# Patient Record
Sex: MALE | Race: WHITE | NOT HISPANIC OR LATINO
[De-identification: names, ages, dates, MRNs, and addresses within clinical notes are randomized per-mention and may not be internally consistent; named-entity substitution may affect disease eponyms.]

---

## 2019-09-23 ENCOUNTER — OTHER (OUTPATIENT)
Age: 84
End: 2019-09-23

## 2019-09-23 DIAGNOSIS — M25.559 PAIN IN UNSPECIFIED HIP: ICD-10-CM

## 2019-09-23 PROBLEM — Z00.00 ENCOUNTER FOR PREVENTIVE HEALTH EXAMINATION: Status: ACTIVE | Noted: 2019-09-23

## 2019-10-01 ENCOUNTER — APPOINTMENT (OUTPATIENT)
Dept: ORTHOPEDIC SURGERY | Facility: CLINIC | Age: 84
End: 2019-10-01
Payer: MEDICARE

## 2019-10-01 VITALS
SYSTOLIC BLOOD PRESSURE: 131 MMHG | BODY MASS INDEX: 28.44 KG/M2 | HEIGHT: 72 IN | WEIGHT: 210 LBS | HEART RATE: 69 BPM | DIASTOLIC BLOOD PRESSURE: 63 MMHG

## 2019-10-01 DIAGNOSIS — Z72.89 OTHER PROBLEMS RELATED TO LIFESTYLE: ICD-10-CM

## 2019-10-01 DIAGNOSIS — Z86.79 PERSONAL HISTORY OF OTHER DISEASES OF THE CIRCULATORY SYSTEM: ICD-10-CM

## 2019-10-01 DIAGNOSIS — Z87.438 PERSONAL HISTORY OF OTHER DISEASES OF MALE GENITAL ORGANS: ICD-10-CM

## 2019-10-01 DIAGNOSIS — Z86.39 PERSONAL HISTORY OF OTHER ENDOCRINE, NUTRITIONAL AND METABOLIC DISEASE: ICD-10-CM

## 2019-10-01 DIAGNOSIS — Z78.9 OTHER SPECIFIED HEALTH STATUS: ICD-10-CM

## 2019-10-01 PROCEDURE — 99204 OFFICE O/P NEW MOD 45 MIN: CPT

## 2019-10-01 PROCEDURE — 73521 X-RAY EXAM HIPS BI 2 VIEWS: CPT

## 2019-10-01 RX ORDER — POTASSIUM CHLORIDE 1500 MG/1
20 TABLET, FILM COATED, EXTENDED RELEASE ORAL
Refills: 0 | Status: ACTIVE | COMMUNITY

## 2019-10-01 RX ORDER — EZETIMIBE 10 MG/1
10 TABLET ORAL
Refills: 0 | Status: ACTIVE | COMMUNITY

## 2019-10-01 RX ORDER — ALFUZOSIN HCL 10 MG/1
10 TABLET, EXTENDED RELEASE ORAL
Refills: 0 | Status: ACTIVE | COMMUNITY

## 2019-10-01 RX ORDER — VALSARTAN 80 MG
80 CAPSULE ORAL
Refills: 0 | Status: ACTIVE | COMMUNITY

## 2019-10-01 RX ORDER — UBIDECARENONE 50 MG
600 CAPSULE ORAL
Refills: 0 | Status: ACTIVE | COMMUNITY

## 2019-10-01 RX ORDER — TORSEMIDE 20 MG/1
20 TABLET ORAL
Refills: 0 | Status: ACTIVE | COMMUNITY

## 2019-10-01 RX ORDER — FINASTERIDE 5 MG/1
5 TABLET, FILM COATED ORAL
Refills: 0 | Status: ACTIVE | COMMUNITY

## 2019-10-01 RX ORDER — POTASSIUM CHLORIDE 1500 MG/1
20 TABLET, EXTENDED RELEASE ORAL
Refills: 0 | Status: ACTIVE | COMMUNITY

## 2019-10-01 NOTE — HISTORY OF PRESENT ILLNESS
[de-identified] : This is an 89-year-old male who presents for evaluation of his hips. He reports pain for many years. Over the last 18 months left hip pain is greater than right hip pain. He denies pain with sitting. He reports doing symptoms he is become far more sedentary. He notes pain with transferring and weightbearing. His difficulty donning/doffing shoes and socks. He uses a walker at all times. He does take Tylenol and Aleve with minimal improvement. He's been seen by pain management. He reports having intra-articular cortisone injections years ago. He concluded a series of Visco supplementation to both hips approximately one month ago. He is noting no significant relief. He presents today to discuss joint replacement surgery. He does report seeing his cardiologist and is currently undergoing a workup to see if he can be cleared for surgical intervention.

## 2019-10-01 NOTE — DISCUSSION/SUMMARY
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  But patient's quality of life is diminished by his hip arthritis and he is interested in hip replacement surgery as he has exhausted conservative treatment.  I do have some concerns with regard to hip replacement in this elderly gentleman.  He has kyphosis and hip flexion contractures and would therefore not be a candidate for an anterior approach, so we would have to do a posterior approach.  With his flexion deformity and kyphosis he is at increased risk of postoperative dislocation.  He also has pitting edema of the lower legs bilaterally which is a major risk factor.  This would have to be improved before we considered elective hip replacement surgery for him.  He was advised to consult with his primary care doctor and cardiologist to determine his medical risk for surgery and whether they would clear him for it.  We can then follow-up for further discussion.\par \par  A discussion was had with the patient regarding a left total hip replacement. Anterior and posterior exposures were discussed.  A long discussion was had with the patient as what the total joint replacement would entail. A model was used to demonstrate the operation and to discuss bearing surfaces of the implants. The hospitalization and rehabilitation were discussed. The use of perioperative antibiotics and DVT prophylaxis were discussed. The risks, benefits and alternatives to surgical intervention were discussed at length with the patient. Specific risks discussed included: infection, wound breakdown, numbness and damage to nerves, tendon, muscle, arteries or other blood vessels, and the possibility of leg length discrepancy. The possibility of recurrent pain, no improvement in pain and actual worsening of the pain were also mentioned in conversation with the patient. Medical complications related to the patient's general medical health including deep vein thrombosis, pulmonary embolus, heart attack, stroke, death and other complications from anesthesia were discussed as well. The patient was told that we will take steps to minimize these risks by using sterile technique, antibiotics and DVT prophylaxis when appropriate and following the patient postoperatively in the clinic setting to monitor progress. The benefits of surgery were discussed with the patient including the potential to improve the current clinical condition through operative intervention.  All questions were answered to the satisfaction of the patient. Models were used as an educational tool. We did discuss implant choices and fixation, with shared decision making with the patient.\par

## 2019-10-01 NOTE — PHYSICAL EXAM
[de-identified] : Exam of the left hip shows hip flexion of 80 degrees, external rotation of 20 degrees, internal rotation is -10 degrees\par Exam of the right hip shows a 15 degrees flexion contracture, hip flexion of 90 degrees, external rotation of 50 degrees, internal rotation is neutral and painful. 5/5 motor strength bilaterally distally. Sensation intact distally. Difficulty getting legs onto the exam table. [de-identified] : The patient appears well nourished  and in no apparent distress.  The patient is alert and oriented to person, place, and time.   Affect and mood appear normal. The head is normocephalic and atraumatic.  The eyes reveal normal sclera and extra ocular muscles are intact. The tongue is midline with no apparent lesions.  Skin shows 2+ pitting edema BLE to the knee.  No respiratory distress noted.  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

## 2019-10-01 NOTE — REVIEW OF SYSTEMS
[Joint Pain] : joint pain [Joint Stiffness] : joint stiffness [Joint Swelling] : joint swelling [Urinary Frequency] : urinary frequency

## 2019-10-01 NOTE — CONSULT LETTER
[Dear  ___] : Dear  [unfilled], [Consult Letter:] : I had the pleasure of evaluating your patient, [unfilled]. [Please see my note below.] : Please see my note below. [Consult Closing:] : Thank you very much for allowing me to participate in the care of this patient.  If you have any questions, please do not hesitate to contact me. [Sincerely,] : Sincerely, [FreeTextEntry3] : Mark Mcdermott MD\par Chief of Joint Replacement\par [facility] \par [facility]\par \par  [FreeTextEntry2] : MACKENZIE CLOUD MD\par

## 2019-10-01 NOTE — ADDENDUM
[FreeTextEntry1] : This note was authored by Ted Bonilla working as a medical scribe for Dr. Mark Mcdermott. The note was reviewed, edited, and revised by Dr. Mark Mcdermott whom is in agreement with the exam findings, imaging findings, and treatment plan. 10/01/2019.

## 2019-11-19 ENCOUNTER — OTHER (OUTPATIENT)
Age: 84
End: 2019-11-19

## 2019-11-26 ENCOUNTER — APPOINTMENT (OUTPATIENT)
Dept: ORTHOPEDIC SURGERY | Facility: CLINIC | Age: 84
End: 2019-11-26
Payer: MEDICARE

## 2019-11-26 VITALS
SYSTOLIC BLOOD PRESSURE: 148 MMHG | BODY MASS INDEX: 28.44 KG/M2 | HEART RATE: 55 BPM | HEIGHT: 72 IN | DIASTOLIC BLOOD PRESSURE: 63 MMHG | WEIGHT: 210 LBS

## 2019-11-26 DIAGNOSIS — M16.0 BILATERAL PRIMARY OSTEOARTHRITIS OF HIP: ICD-10-CM

## 2019-11-26 PROCEDURE — 72170 X-RAY EXAM OF PELVIS: CPT

## 2019-11-26 PROCEDURE — 99215 OFFICE O/P EST HI 40 MIN: CPT

## 2019-11-26 NOTE — DISCUSSION/SUMMARY
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  The patient has significant kyphosis and a substantial hip flexion contracture.  He is here today with his daughter requesting to move forward with hip replacement surgery.  His daughter is in agreement with moving forward with surgery for quality of life.  He was seen by his cardiologist and per the patient reports that he was told that he could proceed with surgery.  We again discussed my concerns of postoperative dislocation given his flexion contracture and kyphosis and that we would need to do a posterior approach for this procedure.  At this point his quality of life is poor and he would like to move forward with surgery as soon as possible.  Based upon the patients continued symptoms and failure to respond to conservative treatment I have recommended a left total hip replacement for the patient. A discussion was had with the patient regarding a left total hip replacement. Anterior and posterior exposures were discussed.  A posterior approach is more appropriate for this patient. A long discussion was had with the patient as what the total joint replacement would entail. A model was used to demonstrate the operation and to discuss bearing surfaces of the implants. The hospitalization and rehabilitation were discussed. The use of perioperative antibiotics and DVT prophylaxis were discussed. The risks, benefits and alternatives to surgical intervention were discussed at length with the patient. Specific risks discussed included: infection, wound breakdown, numbness and damage to nerves, tendon, muscle, arteries or other blood vessels, and the possibility of leg length discrepancy. The possibility of recurrent pain, no improvement in pain and actual worsening of the pain were also mentioned in conversation with the patient. Medical complications related to the patient's general medical health including deep vein thrombosis, pulmonary embolus, heart attack, stroke, death and other complications from anesthesia were discussed as well. The patient was told that we will take steps to minimize these risks by using sterile technique, antibiotics and DVT prophylaxis when appropriate and following the patient postoperatively in the clinic setting to monitor progress. The benefits of surgery were discussed with the patient including the potential to improve the current clinical condition through operative intervention. Alternatives to surgical intervention include continued conservative management which may yield less than optimal results in this particular patient. All questions were answered to the satisfaction of the patient. Models were used as an educational tool. We did discuss implant choices and fixation, with shared decision making with the patient.\par

## 2019-11-26 NOTE — HISTORY OF PRESENT ILLNESS
[de-identified] : The patient is a 89 year old male being seen for evaluation of his left hip.  He is here today with his daughter.  Since his last visit to the office 8 weeks ago he reports persistent pain. Pain is constant and worse with weightbearing activities. He is utilizing a walker. He has difficulty donning/doffing shoes and socks. He is failed conservative treatment. He reports having seen his cardiologist who cleared him for possible surgical intervention. He has been utilizing compression stockings on the left lower extremity for pitting edema.

## 2019-11-26 NOTE — ADDENDUM
[FreeTextEntry1] : This note was authored by Ted Bonilla working as a medical scribe for Dr. Mark Mcdermott. The note was reviewed, edited, and revised by Dr. Mark Mcdermott whom is in agreement with the exam findings, imaging findings, and treatment plan. 11/26/2019.

## 2019-11-26 NOTE — PHYSICAL EXAM
[de-identified] : Exam of the left hip shows a 20-30 degree flexion contracture,  hip flexion of 80 degrees, external rotation of 20 degrees, internal rotation is -10 degrees\par Exam of the right hip shows a 20 degree flexion contracture, hip flexion of 90 degrees, external rotation of 50 degrees, internal rotation is neutral and painful. 5/5 motor strength bilaterally distally. Sensation intact distally. Difficulty getting legs onto the exam table. [de-identified] : The patient appears well nourished and in no apparent distress. The patient is alert and oriented to person, place, and time. Affect and mood appear normal. The head is normocephalic and atraumatic. The eyes reveal normal sclera and extra ocular muscles are intact. The tongue is midline with no apparent lesions. Skin shows 1+ pitting edema BLE to the knee. No respiratory distress noted. Sensation grossly intact. [de-identified] : X-ray 2 views of the pelvis (sitting and standing lateral pelvic view) was reviewed to determine pelvic motion with regard to the hip articulation.

## 2019-12-02 ENCOUNTER — FORM ENCOUNTER (OUTPATIENT)
Age: 84
End: 2019-12-02

## 2019-12-03 ENCOUNTER — OUTPATIENT (OUTPATIENT)
Dept: OUTPATIENT SERVICES | Facility: HOSPITAL | Age: 84
LOS: 1 days | End: 2019-12-03
Payer: MEDICARE

## 2019-12-03 VITALS
RESPIRATION RATE: 20 BRPM | SYSTOLIC BLOOD PRESSURE: 129 MMHG | DIASTOLIC BLOOD PRESSURE: 61 MMHG | HEART RATE: 61 BPM | WEIGHT: 209 LBS | HEIGHT: 73 IN | TEMPERATURE: 98 F

## 2019-12-03 DIAGNOSIS — I10 ESSENTIAL (PRIMARY) HYPERTENSION: ICD-10-CM

## 2019-12-03 DIAGNOSIS — Z98.890 OTHER SPECIFIED POSTPROCEDURAL STATES: Chronic | ICD-10-CM

## 2019-12-03 DIAGNOSIS — Z01.818 ENCOUNTER FOR OTHER PREPROCEDURAL EXAMINATION: ICD-10-CM

## 2019-12-03 DIAGNOSIS — Z98.49 CATARACT EXTRACTION STATUS, UNSPECIFIED EYE: Chronic | ICD-10-CM

## 2019-12-03 DIAGNOSIS — M16.12 UNILATERAL PRIMARY OSTEOARTHRITIS, LEFT HIP: ICD-10-CM

## 2019-12-03 DIAGNOSIS — Z29.9 ENCOUNTER FOR PROPHYLACTIC MEASURES, UNSPECIFIED: ICD-10-CM

## 2019-12-03 DIAGNOSIS — E78.5 HYPERLIPIDEMIA, UNSPECIFIED: ICD-10-CM

## 2019-12-03 LAB
ANION GAP SERPL CALC-SCNC: 11 MMOL/L — SIGNIFICANT CHANGE UP (ref 5–17)
APTT BLD: 29.1 SEC — SIGNIFICANT CHANGE UP (ref 27.5–36.3)
BASOPHILS # BLD AUTO: 0.08 K/UL — SIGNIFICANT CHANGE UP (ref 0–0.2)
BASOPHILS NFR BLD AUTO: 0.9 % — SIGNIFICANT CHANGE UP (ref 0–2)
BLD GP AB SCN SERPL QL: SIGNIFICANT CHANGE UP
BUN SERPL-MCNC: 36 MG/DL — HIGH (ref 8–20)
CALCIUM SERPL-MCNC: 9.2 MG/DL — SIGNIFICANT CHANGE UP (ref 8.6–10.2)
CHLORIDE SERPL-SCNC: 100 MMOL/L — SIGNIFICANT CHANGE UP (ref 98–107)
CO2 SERPL-SCNC: 29 MMOL/L — SIGNIFICANT CHANGE UP (ref 22–29)
CREAT SERPL-MCNC: 1.17 MG/DL — SIGNIFICANT CHANGE UP (ref 0.5–1.3)
EOSINOPHIL # BLD AUTO: 0.15 K/UL — SIGNIFICANT CHANGE UP (ref 0–0.5)
EOSINOPHIL NFR BLD AUTO: 1.7 % — SIGNIFICANT CHANGE UP (ref 0–6)
GLUCOSE SERPL-MCNC: 83 MG/DL — SIGNIFICANT CHANGE UP (ref 70–115)
HBA1C BLD-MCNC: 6.2 % — HIGH (ref 4–5.6)
HCT VFR BLD CALC: 37.7 % — LOW (ref 39–50)
HGB BLD-MCNC: 12 G/DL — LOW (ref 13–17)
IMM GRANULOCYTES NFR BLD AUTO: 0.3 % — SIGNIFICANT CHANGE UP (ref 0–1.5)
INR BLD: 1.03 RATIO — SIGNIFICANT CHANGE UP (ref 0.88–1.16)
LYMPHOCYTES # BLD AUTO: 1.78 K/UL — SIGNIFICANT CHANGE UP (ref 1–3.3)
LYMPHOCYTES # BLD AUTO: 20.1 % — SIGNIFICANT CHANGE UP (ref 13–44)
MCHC RBC-ENTMCNC: 29.5 PG — SIGNIFICANT CHANGE UP (ref 27–34)
MCHC RBC-ENTMCNC: 31.8 GM/DL — LOW (ref 32–36)
MCV RBC AUTO: 92.6 FL — SIGNIFICANT CHANGE UP (ref 80–100)
MONOCYTES # BLD AUTO: 0.77 K/UL — SIGNIFICANT CHANGE UP (ref 0–0.9)
MONOCYTES NFR BLD AUTO: 8.7 % — SIGNIFICANT CHANGE UP (ref 2–14)
MRSA PCR RESULT.: SIGNIFICANT CHANGE UP
NEUTROPHILS # BLD AUTO: 6.04 K/UL — SIGNIFICANT CHANGE UP (ref 1.8–7.4)
NEUTROPHILS NFR BLD AUTO: 68.3 % — SIGNIFICANT CHANGE UP (ref 43–77)
PLATELET # BLD AUTO: 262 K/UL — SIGNIFICANT CHANGE UP (ref 150–400)
POTASSIUM SERPL-MCNC: 4.4 MMOL/L — SIGNIFICANT CHANGE UP (ref 3.5–5.3)
POTASSIUM SERPL-SCNC: 4.4 MMOL/L — SIGNIFICANT CHANGE UP (ref 3.5–5.3)
PROTHROM AB SERPL-ACNC: 11.8 SEC — SIGNIFICANT CHANGE UP (ref 10–12.9)
RBC # BLD: 4.07 M/UL — LOW (ref 4.2–5.8)
RBC # FLD: 12.3 % — SIGNIFICANT CHANGE UP (ref 10.3–14.5)
S AUREUS DNA NOSE QL NAA+PROBE: SIGNIFICANT CHANGE UP
SODIUM SERPL-SCNC: 140 MMOL/L — SIGNIFICANT CHANGE UP (ref 135–145)
WBC # BLD: 8.85 K/UL — SIGNIFICANT CHANGE UP (ref 3.8–10.5)
WBC # FLD AUTO: 8.85 K/UL — SIGNIFICANT CHANGE UP (ref 3.8–10.5)

## 2019-12-03 PROCEDURE — 71046 X-RAY EXAM CHEST 2 VIEWS: CPT | Mod: 26

## 2019-12-03 PROCEDURE — 93010 ELECTROCARDIOGRAM REPORT: CPT

## 2019-12-03 RX ORDER — POTASSIUM CHLORIDE 20 MEQ
0 PACKET (EA) ORAL
Qty: 0 | Refills: 0 | DISCHARGE

## 2019-12-03 RX ORDER — POTASSIUM CHLORIDE 20 MEQ
0 PACKET (EA) ORAL
Qty: 90 | Refills: 0 | DISCHARGE

## 2019-12-03 NOTE — H&P PST ADULT - HISTORY OF PRESENT ILLNESS
89 year old male present with c/o left hip pain unrelieved with conservative measures. He is now schedule for left total hip replacement, posterior approach.

## 2019-12-03 NOTE — H&P PST ADULT - NEGATIVE CARDIOVASCULAR SYMPTOMS
no chest pain/no palpitations/no claudication/no dyspnea on exertion/no orthopnea/no peripheral edema/no paroxysmal nocturnal dyspnea

## 2019-12-03 NOTE — PATIENT PROFILE ADULT - NSPROEDALEARNPREF_GEN_A_NUR
individual instruction/verbal instruction/Pre op teaching surgical scrub pain management instructions given to pt by NP/written material

## 2019-12-03 NOTE — H&P PST ADULT - NSICDXPROBLEM_GEN_ALL_CORE_FT
PROBLEM DIAGNOSES  Problem: HLD (hyperlipidemia)  Assessment and Plan: cardiac clearance     Problem: Need for prophylactic measure  Assessment and Plan: high risk for VTE event, the surgical team will evaluate for appropriate VTE prophylaxis     Problem: HTN (hypertension)  Assessment and Plan: continue medication as directed  medical clearance     Problem: Arthropathy of left hip  Assessment and Plan: left hip replacement, posterior approach

## 2019-12-03 NOTE — H&P PST ADULT - ASSESSMENT
89 year old male present with c/o left hip pain unrelieved with conservative measures. He is now schedule for left total hip replacement, posterior approach.     CAPRINI SCORE [CLOT]    AGE RELATED RISK FACTORS                                                       MOBILITY RELATED FACTORS  [ ] Age 41-60 years                                            (1 Point)                  [ ] Bed rest                                                        (1 Point)  [ ] Age: 61-74 years                                           (2 Points)                 [ ] Plaster cast                                                   (2 Points)  [x ] Age= 75 years                                              (3 Points)                 [ ] Bed bound for more than 72 hours                 (2 Points)    DISEASE RELATED RISK FACTORS                                               GENDER SPECIFIC FACTORS  [ ] Edema in the lower extremities                       (1 Point)                  [ ] Pregnancy                                                     (1 Point)  [x ] Varicose veins                                               (1 Point)                  [ ] Post-partum < 6 weeks                                   (1 Point)             [ ]x BMI > 25 Kg/m2                                            (1 Point)                  [ ] Hormonal therapy  or oral contraception          (1 Point)                 [ ] Sepsis (in the previous month)                        (1 Point)                  [ ] History of pregnancy complications                 (1 point)  [ ] Pneumonia or serious lung disease                                               [ ] Unexplained or recurrent                     (1 Point)           (in the previous month)                               (1 Point)  [ ] Abnormal pulmonary function test                     (1 Point)                 SURGERY RELATED RISK FACTORS  [ ] Acute myocardial infarction                              (1 Point)                 [ ]  Section                                             (1 Point)  [ ] Congestive heart failure (in the previous month)  (1 Point)               [ ] Minor surgery                                                  (1 Point)   [ ] Inflammatory bowel disease                             (1 Point)                 [ ] Arthroscopic surgery                                        (2 Points)  [ ] Central venous access                                      (2 Points)                [ ] General surgery lasting more than 45 minutes   (2 Points)       [ ] Stroke (in the previous month)                          (5 Points)               [ x] Elective arthroplasty                                         (5 Points)                                                                                                                                               HEMATOLOGY RELATED FACTORS                                                 TRAUMA RELATED RISK FACTORS  [ ] Prior episodes of VTE                                     (3 Points)                [ ] Fracture of the hip, pelvis, or leg                       (5 Points)  [ ] Positive family history for VTE                         (3 Points)                 [ ] Acute spinal cord injury (in the previous month)  (5 Points)  [ ] Prothrombin 25017 A                                     (3 Points)                 [ ] Paralysis  (less than 1 month)                             (5 Points)  [ ] Factor V Leiden                                             (3 Points)                  [ ] Multiple Trauma within 1 month                        (5 Points)  [ ] Lupus anticoagulants                                     (3 Points)                                                           [ ] Anticardiolipin antibodies                               (3 Points)                                                       [ ] High homocysteine in the blood                      (3 Points)                                             [ ] Other congenital or acquired thrombophilia      (3 Points)                                                [ ] Heparin induced thrombocytopenia                  (3 Points)                                          Total Score [   10    ]    Caprini Score 0 - 2:  Low Risk, No VTE Prophylaxis required for most patients, encourage ambulation  Caprini Score 3 - 6:  At Risk, pharmacologic VTE prophylaxis is indicated for most patients (in the absence of a contraindication)  Caprini Score Greater than or = 7:  High Risk, pharmacologic VTE prophylaxis is indicated for most patients (in the absence of a contraindication)

## 2019-12-06 ENCOUNTER — APPOINTMENT (OUTPATIENT)
Dept: ORTHOPEDIC SURGERY | Facility: HOSPITAL | Age: 84
End: 2019-12-06

## 2019-12-06 ENCOUNTER — INPATIENT (INPATIENT)
Facility: HOSPITAL | Age: 84
LOS: 3 days | Discharge: NURSING, REHAB OR LONG-TERM CARE | DRG: 470 | End: 2019-12-10
Attending: ORTHOPAEDIC SURGERY | Admitting: ORTHOPAEDIC SURGERY
Payer: MEDICARE

## 2019-12-06 ENCOUNTER — TRANSCRIPTION ENCOUNTER (OUTPATIENT)
Age: 84
End: 2019-12-06

## 2019-12-06 VITALS
DIASTOLIC BLOOD PRESSURE: 61 MMHG | TEMPERATURE: 98 F | HEART RATE: 67 BPM | RESPIRATION RATE: 16 BRPM | OXYGEN SATURATION: 95 % | SYSTOLIC BLOOD PRESSURE: 130 MMHG | HEIGHT: 73 IN | WEIGHT: 207.9 LBS

## 2019-12-06 DIAGNOSIS — Z98.890 OTHER SPECIFIED POSTPROCEDURAL STATES: Chronic | ICD-10-CM

## 2019-12-06 DIAGNOSIS — M16.12 UNILATERAL PRIMARY OSTEOARTHRITIS, LEFT HIP: ICD-10-CM

## 2019-12-06 DIAGNOSIS — Z98.49 CATARACT EXTRACTION STATUS, UNSPECIFIED EYE: Chronic | ICD-10-CM

## 2019-12-06 LAB
ABO RH CONFIRMATION: SIGNIFICANT CHANGE UP
GLUCOSE BLDC GLUCOMTR-MCNC: 83 MG/DL — SIGNIFICANT CHANGE UP (ref 70–99)
GLUCOSE BLDC GLUCOMTR-MCNC: 83 MG/DL — SIGNIFICANT CHANGE UP (ref 70–99)
GLUCOSE BLDC GLUCOMTR-MCNC: 86 MG/DL — SIGNIFICANT CHANGE UP (ref 70–99)

## 2019-12-06 PROCEDURE — 27130 TOTAL HIP ARTHROPLASTY: CPT | Mod: LT

## 2019-12-06 PROCEDURE — 73501 X-RAY EXAM HIP UNI 1 VIEW: CPT | Mod: 26,LT

## 2019-12-06 PROCEDURE — 27130 TOTAL HIP ARTHROPLASTY: CPT | Mod: AS,LT

## 2019-12-06 RX ORDER — SENNA PLUS 8.6 MG/1
2 TABLET ORAL AT BEDTIME
Refills: 0 | Status: DISCONTINUED | OUTPATIENT
Start: 2019-12-06 | End: 2019-12-10

## 2019-12-06 RX ORDER — ACETAMINOPHEN 500 MG
975 TABLET ORAL ONCE
Refills: 0 | Status: COMPLETED | OUTPATIENT
Start: 2019-12-06 | End: 2019-12-06

## 2019-12-06 RX ORDER — TRANEXAMIC ACID 100 MG/ML
1000 INJECTION, SOLUTION INTRAVENOUS ONCE
Refills: 0 | Status: DISCONTINUED | OUTPATIENT
Start: 2019-12-06 | End: 2019-12-06

## 2019-12-06 RX ORDER — CEFAZOLIN SODIUM 1 G
2000 VIAL (EA) INJECTION
Refills: 0 | Status: COMPLETED | OUTPATIENT
Start: 2019-12-06 | End: 2019-12-07

## 2019-12-06 RX ORDER — OXYCODONE HYDROCHLORIDE 5 MG/1
5 TABLET ORAL
Refills: 0 | Status: DISCONTINUED | OUTPATIENT
Start: 2019-12-06 | End: 2019-12-10

## 2019-12-06 RX ORDER — ONDANSETRON 8 MG/1
4 TABLET, FILM COATED ORAL ONCE
Refills: 0 | Status: DISCONTINUED | OUTPATIENT
Start: 2019-12-06 | End: 2019-12-06

## 2019-12-06 RX ORDER — KETOROLAC TROMETHAMINE 30 MG/ML
15 SYRINGE (ML) INJECTION ONCE
Refills: 0 | Status: DISCONTINUED | OUTPATIENT
Start: 2019-12-06 | End: 2019-12-06

## 2019-12-06 RX ORDER — SODIUM CHLORIDE 9 MG/ML
1000 INJECTION, SOLUTION INTRAVENOUS
Refills: 0 | Status: DISCONTINUED | OUTPATIENT
Start: 2019-12-06 | End: 2019-12-09

## 2019-12-06 RX ORDER — SODIUM CHLORIDE 9 MG/ML
3 INJECTION INTRAMUSCULAR; INTRAVENOUS; SUBCUTANEOUS EVERY 8 HOURS
Refills: 0 | Status: DISCONTINUED | OUTPATIENT
Start: 2019-12-06 | End: 2019-12-06

## 2019-12-06 RX ORDER — HYDROMORPHONE HYDROCHLORIDE 2 MG/ML
0.5 INJECTION INTRAMUSCULAR; INTRAVENOUS; SUBCUTANEOUS
Refills: 0 | Status: DISCONTINUED | OUTPATIENT
Start: 2019-12-06 | End: 2019-12-10

## 2019-12-06 RX ORDER — CELECOXIB 200 MG/1
400 CAPSULE ORAL ONCE
Refills: 0 | Status: COMPLETED | OUTPATIENT
Start: 2019-12-06 | End: 2019-12-06

## 2019-12-06 RX ORDER — FENTANYL CITRATE 50 UG/ML
25 INJECTION INTRAVENOUS
Refills: 0 | Status: DISCONTINUED | OUTPATIENT
Start: 2019-12-06 | End: 2019-12-06

## 2019-12-06 RX ORDER — OXYCODONE HYDROCHLORIDE 5 MG/1
10 TABLET ORAL
Refills: 0 | Status: DISCONTINUED | OUTPATIENT
Start: 2019-12-06 | End: 2019-12-10

## 2019-12-06 RX ORDER — GABAPENTIN 400 MG/1
300 CAPSULE ORAL ONCE
Refills: 0 | Status: COMPLETED | OUTPATIENT
Start: 2019-12-06 | End: 2019-12-06

## 2019-12-06 RX ORDER — ACETAMINOPHEN 500 MG
975 TABLET ORAL EVERY 8 HOURS
Refills: 0 | Status: DISCONTINUED | OUTPATIENT
Start: 2019-12-06 | End: 2019-12-10

## 2019-12-06 RX ORDER — ONDANSETRON 8 MG/1
4 TABLET, FILM COATED ORAL EVERY 6 HOURS
Refills: 0 | Status: DISCONTINUED | OUTPATIENT
Start: 2019-12-06 | End: 2019-12-10

## 2019-12-06 RX ORDER — CELECOXIB 200 MG/1
200 CAPSULE ORAL
Refills: 0 | Status: DISCONTINUED | OUTPATIENT
Start: 2019-12-07 | End: 2019-12-09

## 2019-12-06 RX ORDER — TAMSULOSIN HYDROCHLORIDE 0.4 MG/1
0.8 CAPSULE ORAL AT BEDTIME
Refills: 0 | Status: DISCONTINUED | OUTPATIENT
Start: 2019-12-06 | End: 2019-12-10

## 2019-12-06 RX ORDER — SODIUM CHLORIDE 9 MG/ML
1000 INJECTION, SOLUTION INTRAVENOUS
Refills: 0 | Status: DISCONTINUED | OUTPATIENT
Start: 2019-12-06 | End: 2019-12-06

## 2019-12-06 RX ORDER — POLYETHYLENE GLYCOL 3350 17 G/17G
17 POWDER, FOR SOLUTION ORAL DAILY
Refills: 0 | Status: DISCONTINUED | OUTPATIENT
Start: 2019-12-06 | End: 2019-12-10

## 2019-12-06 RX ORDER — HYDROMORPHONE HYDROCHLORIDE 2 MG/ML
4 INJECTION INTRAMUSCULAR; INTRAVENOUS; SUBCUTANEOUS
Refills: 0 | Status: DISCONTINUED | OUTPATIENT
Start: 2019-12-06 | End: 2019-12-10

## 2019-12-06 RX ORDER — CEFAZOLIN SODIUM 1 G
2000 VIAL (EA) INJECTION ONCE
Refills: 0 | Status: DISCONTINUED | OUTPATIENT
Start: 2019-12-06 | End: 2019-12-06

## 2019-12-06 RX ORDER — MAGNESIUM HYDROXIDE 400 MG/1
30 TABLET, CHEWABLE ORAL AT BEDTIME
Refills: 0 | Status: DISCONTINUED | OUTPATIENT
Start: 2019-12-06 | End: 2019-12-10

## 2019-12-06 RX ORDER — FINASTERIDE 5 MG/1
5 TABLET, FILM COATED ORAL DAILY
Refills: 0 | Status: DISCONTINUED | OUTPATIENT
Start: 2019-12-06 | End: 2019-12-10

## 2019-12-06 RX ORDER — POTASSIUM CHLORIDE 20 MEQ
20 PACKET (EA) ORAL DAILY
Refills: 0 | Status: DISCONTINUED | OUTPATIENT
Start: 2019-12-06 | End: 2019-12-09

## 2019-12-06 RX ORDER — LOSARTAN POTASSIUM 100 MG/1
50 TABLET, FILM COATED ORAL DAILY
Refills: 0 | Status: DISCONTINUED | OUTPATIENT
Start: 2019-12-08 | End: 2019-12-10

## 2019-12-06 RX ORDER — ASPIRIN/CALCIUM CARB/MAGNESIUM 324 MG
325 TABLET ORAL
Refills: 0 | Status: DISCONTINUED | OUTPATIENT
Start: 2019-12-07 | End: 2019-12-10

## 2019-12-06 RX ADMIN — Medication 100 MILLIGRAM(S): at 21:50

## 2019-12-06 RX ADMIN — GABAPENTIN 300 MILLIGRAM(S): 400 CAPSULE ORAL at 11:48

## 2019-12-06 RX ADMIN — SENNA PLUS 2 TABLET(S): 8.6 TABLET ORAL at 21:49

## 2019-12-06 RX ADMIN — Medication 975 MILLIGRAM(S): at 22:18

## 2019-12-06 RX ADMIN — Medication 975 MILLIGRAM(S): at 11:49

## 2019-12-06 RX ADMIN — TAMSULOSIN HYDROCHLORIDE 0.8 MILLIGRAM(S): 0.4 CAPSULE ORAL at 21:49

## 2019-12-06 RX ADMIN — CELECOXIB 400 MILLIGRAM(S): 200 CAPSULE ORAL at 11:49

## 2019-12-06 RX ADMIN — Medication 975 MILLIGRAM(S): at 21:48

## 2019-12-06 RX ADMIN — Medication 975 MILLIGRAM(S): at 11:48

## 2019-12-06 NOTE — PHYSICAL THERAPY INITIAL EVALUATION ADULT - ADDITIONAL COMMENTS
Pt reports lives in split ranch home with wife and grandson, 7 steps to enter home with 2 rails, 7 steps down to bottom level with rail and 5 steps to top level with rail.  Pt reports he can also go through garage and not have to do any stairs and can stay on bottom level.  Pt owns RW, SAC, grab bars in shower.  Pt sleeps in recliner that assists to stand but reports can stand without using the button.  PTA pt reports was able to walk 20 feet with RW due to hip pain.

## 2019-12-06 NOTE — PHYSICAL THERAPY INITIAL EVALUATION ADULT - GENERAL OBSERVATIONS, REHAB EVAL
Pt received lying in bed, (+) cardiac monitor, (+) supplemental O2 via NC, (+) IV lock Right UE, (+) SCDs, (+) hip adduction pillow, NAD, agreeable to PT

## 2019-12-07 LAB
ANION GAP SERPL CALC-SCNC: 11 MMOL/L — SIGNIFICANT CHANGE UP (ref 5–17)
BUN SERPL-MCNC: 34 MG/DL — HIGH (ref 8–20)
CALCIUM SERPL-MCNC: 8.9 MG/DL — SIGNIFICANT CHANGE UP (ref 8.6–10.2)
CHLORIDE SERPL-SCNC: 100 MMOL/L — SIGNIFICANT CHANGE UP (ref 98–107)
CO2 SERPL-SCNC: 27 MMOL/L — SIGNIFICANT CHANGE UP (ref 22–29)
CREAT SERPL-MCNC: 1.37 MG/DL — HIGH (ref 0.5–1.3)
GLUCOSE SERPL-MCNC: 118 MG/DL — HIGH (ref 70–115)
HCT VFR BLD CALC: 33.2 % — LOW (ref 39–50)
HGB BLD-MCNC: 10.7 G/DL — LOW (ref 13–17)
MCHC RBC-ENTMCNC: 29.5 PG — SIGNIFICANT CHANGE UP (ref 27–34)
MCHC RBC-ENTMCNC: 32.2 GM/DL — SIGNIFICANT CHANGE UP (ref 32–36)
MCV RBC AUTO: 91.5 FL — SIGNIFICANT CHANGE UP (ref 80–100)
PLATELET # BLD AUTO: 223 K/UL — SIGNIFICANT CHANGE UP (ref 150–400)
POTASSIUM SERPL-MCNC: 4.6 MMOL/L — SIGNIFICANT CHANGE UP (ref 3.5–5.3)
POTASSIUM SERPL-SCNC: 4.6 MMOL/L — SIGNIFICANT CHANGE UP (ref 3.5–5.3)
RBC # BLD: 3.63 M/UL — LOW (ref 4.2–5.8)
RBC # FLD: 12.2 % — SIGNIFICANT CHANGE UP (ref 10.3–14.5)
SODIUM SERPL-SCNC: 138 MMOL/L — SIGNIFICANT CHANGE UP (ref 135–145)
WBC # BLD: 8.51 K/UL — SIGNIFICANT CHANGE UP (ref 3.8–10.5)
WBC # FLD AUTO: 8.51 K/UL — SIGNIFICANT CHANGE UP (ref 3.8–10.5)

## 2019-12-07 PROCEDURE — 99222 1ST HOSP IP/OBS MODERATE 55: CPT

## 2019-12-07 RX ADMIN — Medication 325 MILLIGRAM(S): at 05:18

## 2019-12-07 RX ADMIN — TAMSULOSIN HYDROCHLORIDE 0.8 MILLIGRAM(S): 0.4 CAPSULE ORAL at 21:10

## 2019-12-07 RX ADMIN — Medication 325 MILLIGRAM(S): at 18:29

## 2019-12-07 RX ADMIN — Medication 975 MILLIGRAM(S): at 05:17

## 2019-12-07 RX ADMIN — Medication 975 MILLIGRAM(S): at 21:10

## 2019-12-07 RX ADMIN — OXYCODONE HYDROCHLORIDE 10 MILLIGRAM(S): 5 TABLET ORAL at 14:39

## 2019-12-07 RX ADMIN — SENNA PLUS 2 TABLET(S): 8.6 TABLET ORAL at 21:10

## 2019-12-07 RX ADMIN — Medication 975 MILLIGRAM(S): at 21:12

## 2019-12-07 RX ADMIN — OXYCODONE HYDROCHLORIDE 10 MILLIGRAM(S): 5 TABLET ORAL at 05:17

## 2019-12-07 RX ADMIN — POLYETHYLENE GLYCOL 3350 17 GRAM(S): 17 POWDER, FOR SOLUTION ORAL at 13:38

## 2019-12-07 RX ADMIN — Medication 1 TABLET(S): at 13:38

## 2019-12-07 RX ADMIN — FINASTERIDE 5 MILLIGRAM(S): 5 TABLET, FILM COATED ORAL at 13:37

## 2019-12-07 RX ADMIN — CELECOXIB 200 MILLIGRAM(S): 200 CAPSULE ORAL at 18:30

## 2019-12-07 RX ADMIN — Medication 100 MILLIGRAM(S): at 05:18

## 2019-12-07 RX ADMIN — OXYCODONE HYDROCHLORIDE 10 MILLIGRAM(S): 5 TABLET ORAL at 14:30

## 2019-12-07 RX ADMIN — Medication 975 MILLIGRAM(S): at 05:47

## 2019-12-07 RX ADMIN — Medication 975 MILLIGRAM(S): at 13:30

## 2019-12-07 RX ADMIN — CELECOXIB 200 MILLIGRAM(S): 200 CAPSULE ORAL at 05:18

## 2019-12-07 RX ADMIN — CELECOXIB 200 MILLIGRAM(S): 200 CAPSULE ORAL at 18:29

## 2019-12-07 RX ADMIN — Medication 975 MILLIGRAM(S): at 13:37

## 2019-12-07 RX ADMIN — CELECOXIB 200 MILLIGRAM(S): 200 CAPSULE ORAL at 05:47

## 2019-12-07 RX ADMIN — Medication 20 MILLIEQUIVALENT(S): at 13:38

## 2019-12-07 NOTE — OCCUPATIONAL THERAPY INITIAL EVALUATION ADULT - EATING, PREVIOUS LEVEL OF FUNCTION, OT EVAL
(ZAROXOLYN) 5 MG tablet 5 mg Only on Mon., tues, weds, thurs. , fri.  aspirin 81 MG tablet Take 81 mg by mouth daily      Prenatal Multivit-Min-Fe-FA (PRE-MARIA DE JESUS PO) Take 1 tablet by mouth daily.  nitroGLYCERIN (NITROSTAT) 0.4 MG SL tablet Place 0.4 mg under the tongue every 5 minutes as needed.  lactulose (CHRONULAC) 10 GM/15ML solution Take 10 g by mouth 2 times daily        No current facility-administered medications for this visit. IMPRESSIONS:      1. CKD stage IV         PLAN:  1. We discussed the eGFR today. 2. We will continue all current medications without changes. 3. The patient is planning peritoneal dialysis. 4. We will see the patient back in 3 months.               _________________________________  Onofre Stout DO  [facility]      Laine Alvarenga MD with increased time and effort to manage setup./independent

## 2019-12-07 NOTE — DISCHARGE NOTE PROVIDER - NSDCACTIVITY_GEN_ALL_CORE
Walking - Outdoors allowed/Do not drive or operate machinery/No heavy lifting/straining/Do not make important decisions/Walking - Indoors allowed

## 2019-12-07 NOTE — OCCUPATIONAL THERAPY INITIAL EVALUATION ADULT - LOWER BODY DRESSING, PREVIOUS LEVEL OF FUNCTION, OT EVAL
needed assist/needs device/Pt needed assist with left sock and shoe past 2 months.  He has a reacher and sock aide but it's "easier for her to help me."

## 2019-12-07 NOTE — OCCUPATIONAL THERAPY INITIAL EVALUATION ADULT - PHYSICAL ASSIST/NONPHYSICAL ASSIST: BED TO CHAIR, REHAB EVAL
Pt reaching for sturdy support surfaces rather than bilateral hands on RW./verbal cues/1 person assist

## 2019-12-07 NOTE — OCCUPATIONAL THERAPY INITIAL EVALUATION ADULT - TOILETING, PREVIOUS LEVEL OF FUNCTION, OT EVAL
independent/modified independent with with increased time and effort.  Pt reports propping on sink for support.

## 2019-12-07 NOTE — DISCHARGE NOTE PROVIDER - NSDCCPCAREPLAN_GEN_ALL_CORE_FT
PRINCIPAL DISCHARGE DIAGNOSIS  Diagnosis: Primary osteoarthritis of left hip  Assessment and Plan of Treatment:

## 2019-12-07 NOTE — DISCHARGE NOTE PROVIDER - NSDCMRMEDTOKEN_GEN_ALL_CORE_FT
alfuzosin 10 mg oral tablet, extended release: 1 tab(s) orally once a day  aspirin 81 mg oral delayed release tablet: 1 tab(s) orally once a day  FINASTERIDE  TAB 5MG:   IBUPROFEN    TAB 800MG:   KLOR-CON M20 TAB 20MEQ ER: 1  orally once a day  losartan 50 mg oral tablet: 1 tab(s) orally once a day  Red Yeast Rice 600 mg oral capsule: 1 cap(s) orally once a day  torsemide 20 mg oral tablet: 1 tab(s) orally once a day  Zetia 10 mg oral tablet: 1 tab(s) orally once a day acetaminophen 325 mg oral tablet: 3 tab(s) orally every 8 hours  alfuzosin 10 mg oral tablet, extended release: 1 tab(s) orally once a day  aspirin 325 mg oral delayed release tablet: 1 tab(s) orally 2 times a day  FINASTERIDE  TAB 5MG:   KLOR-CON M20 TAB 20MEQ ER: 1  orally once a day  losartan 50 mg oral tablet: 1 tab(s) orally once a day  oxyCODONE 10 mg oral tablet: 1 tab(s) orally every 4 to 6 hours, As Needed moderate pain  oxyCODONE 5 mg oral tablet: 1 tab(s) orally every 4 to 6 hours, As Needed mild pain  Red Yeast Rice 600 mg oral capsule: 1 cap(s) orally once a day  senna oral tablet: 2 tab(s) orally once a day (at bedtime), As Needed , hold for now due to episodes of diarrhea  torsemide 20 mg oral tablet: 1 tab(s) orally once a day ,   hold for 2 days due to acute renal failure / dehydration , of renal function stable restart in 2 days   Zetia 10 mg oral tablet: 1 tab(s) orally once a day

## 2019-12-07 NOTE — CONSULT NOTE ADULT - SUBJECTIVE AND OBJECTIVE BOX
89 y/o M with Hx of BPH, HLD, HTN, CHF OA, he has left hip pain unrelieved with conservative measures, he came in for scheduled left total hip replacement, s/p procedure post op day 1, he tolerated procedure well, his pain is well controlled with current pain meds, denies dizziness, fever, chills, chest pain, nausea, vomiting.     Allergies/Medications:   Allergies:        Allergies:  	No Known Allergies:     Home Medications:   * Patient Currently Takes Medications as of 03-Dec-2019 14:21 documented in Structured Notes  · 	losartan 50 mg oral tablet: Last Dose Taken:  , 1 tab(s) orally once a day  · 	Zetia 10 mg oral tablet: Last Dose Taken:  , 1 tab(s) orally once a day  · 	torsemide 20 mg oral tablet: Last Dose Taken:  , 1 tab(s) orally once a day  · 	alfuzosin 10 mg oral tablet, extended release: Last Dose Taken:  , 1 tab(s) orally once a day  · 	IBUPROFEN    TAB 800MG: Last Dose Taken:    · 	KLOR-CON M20 TAB 20MEQ ER: Last Dose Taken:  , 1  orally once a day  · 	Red Yeast Rice 600 mg oral capsule: Last Dose Taken:  , 1 cap(s) orally once a day  · 	aspirin 81 mg oral delayed release tablet: Last Dose Taken:  , 1 tab(s) orally once a day  · 	FINASTERIDE  TAB 5MG: Last Dose Taken:      PMH/PSH/FH/SH:    Past Medical, Past Surgical, and Family History:  PAST MEDICAL HISTORY:  BPH (benign prostatic hyperplasia)     HLD (hyperlipidemia)     [place] (hard of hearing)     HTN (hypertension)     Melanoma     OA (osteoarthritis).     PAST SURGICAL HISTORY:  H/O hernia repair     H/O melanoma excision scalp    S/P cataract surgery.     Social History:  · Marital Status	     Alcohol Use History:  · Have you ever consumed alcohol	yes...  · Alcohol Type	wine  · Alcohol Frequency	monthly or less  · Alcohol Amount	1-2 drinks     Tobacco Usage:  · Tobacco Usage: Former smoker    Review of Systems:     CONSTITUTIONAL: No fever, fatigue  RESPIRATORY: No cough, No shortness of breath  CARDIOVASCULAR: No chest pain, palpitations  GASTROINTESTINAL: No abdominal, No nausea, vomiting  NEUROLOGICAL: No headaches,  loss of strength.  MISCELLANEOUS: No joint swelling or pain       Vital Signs Last 24 Hrs  T(C): 36.7 (07 Dec 2019 09:25), Max: 36.7 (07 Dec 2019 09:25)  T(F): 98 (07 Dec 2019 09:25), Max: 98 (07 Dec 2019 09:25)  HR: 100 (07 Dec 2019 14:32) (60 - 100)  BP: 119/61 (07 Dec 2019 14:32) (85/51 - 126/59)  RR: 20 (07 Dec 2019 14:32) (18 - 22)  SpO2: 91% (07 Dec 2019 14:32) (91% - 97%)    PHYSICAL EXAM:    GENERAL: Elderly male looking comfortable   HEENT: PERRL  NECK: soft, Supple, No JVD,   CHEST/LUNG: Clear to auscultate bilaterally; No wheezing  HEART: S1S2+, Regular rate and rhythm; No murmurs  ABDOMEN: Soft, Nontender, Nondistended; Bowel sounds present  EXTREMITIES:  2+ Peripheral Pulses, No edema, left hip Surgical wound with dressings on, no bleeding or soaking.   SKIN: No rashes or lesions  NEURO: AAOX3, no focal deficits, no motor r sensory loss  PSYCH: normal mood                       10.7   8.51  )-----------( 223      ( 07 Dec 2019 08:22 )             33.2     12-07    138  |  100  |  34.0<H>  ----------------------------<  118<H>  4.6   |  27.0  |  1.37<H>    Ca    8.9      07 Dec 2019 08:22              Current Meds:     MEDICATIONS  (STANDING):  acetaminophen   Tablet .. 975 milliGRAM(s) Oral every 8 hours  aspirin enteric coated 325 milliGRAM(s) Oral two times a day  celecoxib 200 milliGRAM(s) Oral two times a day  finasteride 5 milliGRAM(s) Oral daily  lactated ringers. 1000 milliLiter(s) (100 mL/Hr) IV Continuous <Continuous>  multivitamin 1 Tablet(s) Oral daily  polyethylene glycol 3350 17 Gram(s) Oral daily  potassium chloride    Tablet ER 20 milliEquivalent(s) Oral daily  senna 2 Tablet(s) Oral at bedtime  tamsulosin 0.8 milliGRAM(s) Oral at bedtime    MEDICATIONS  (PRN):  aluminum hydroxide/magnesium hydroxide/simethicone Suspension 30 milliLiter(s) Oral four times a day PRN Indigestion  HYDROmorphone   Tablet 4 milliGRAM(s) Oral every 3 hours PRN Severe Pain (7 - 10)  HYDROmorphone  Injectable 0.5 milliGRAM(s) IV Push every 3 hours PRN Breakthrough  magnesium hydroxide Suspension 30 milliLiter(s) Oral at bedtime PRN Constipation  ondansetron Injectable 4 milliGRAM(s) IV Push every 6 hours PRN Nausea and/or Vomiting  oxyCODONE    IR 5 milliGRAM(s) Oral every 3 hours PRN Mild Pain (1 - 3)  oxyCODONE    IR 10 milliGRAM(s) Oral every 3 hours PRN Moderate Pain (4 - 6)

## 2019-12-07 NOTE — OCCUPATIONAL THERAPY INITIAL EVALUATION ADULT - BED MOBILITY/TRANSFERS, PREVIOUS LEVEL OF FUNCTION, OT EVAL
needs device/independent/Pt was modified independent with RW short distances only, with with increased time and effort.   Pt sleeps in lift recliner

## 2019-12-07 NOTE — OCCUPATIONAL THERAPY INITIAL EVALUATION ADULT - COORDINATION ASSESSED, REHAB EVAL
finger to nose/mild impairment, bilateral tremors observed - may be due to weakness. mild impairment, bilateral tremors observed - may be due to weakness./finger to nose

## 2019-12-07 NOTE — OCCUPATIONAL THERAPY INITIAL EVALUATION ADULT - NS ASR FOLLOW COMMAND OT EVAL
needs repetition at times, increased volume due to [place] with hearing aides.  Pt hears better in right ear./75% of the time

## 2019-12-07 NOTE — OCCUPATIONAL THERAPY INITIAL EVALUATION ADULT - BATHING, PREVIOUS LEVEL OF FUNCTION, OT EVAL
needs device/modified independent with grab bars in tub, with increased time and effort.  Pt reports spouse using a bench - sounded like a transfer tub bench but further inquiry to verify./independent

## 2019-12-07 NOTE — OCCUPATIONAL THERAPY INITIAL EVALUATION ADULT - NS ASR BATHING EQUIP NEEDS
transfer tub bench/pt may have transfer tub bench - pt described tub seat as such but needs to be verified./long hand sponge

## 2019-12-07 NOTE — DISCHARGE NOTE PROVIDER - NSDCCPTREATMENT_GEN_ALL_CORE_FT
PRINCIPAL PROCEDURE  Procedure: Arthroplasty, hip, total, posterior approach  Findings and Treatment:

## 2019-12-07 NOTE — PROGRESS NOTE ADULT - SUBJECTIVE AND OBJECTIVE BOX
ORTHO-POST-OP PROGRESS NOTE:      [id number]    RADHA PIERRE      PROCEDURE: Left total hip arthroplasty   Surgeon: Dr. Mcdermott  DOS: 12/6/19    89y Patient seen and examined. Patient reports of moderate discomfort that is controlled by pain medications. Patient denies of acute sensory or motor changes. Patient tolerating PO fluids and diet well, voiding. Patient has ambulated with PT postoperatively.     I&O's Detail      acetaminophen   Tablet .. 975 milliGRAM(s) Oral every 8 hours  aluminum hydroxide/magnesium hydroxide/simethicone Suspension 30 milliLiter(s) Oral four times a day PRN  aspirin enteric coated 325 milliGRAM(s) Oral two times a day  ceFAZolin   IVPB 2000 milliGRAM(s) IV Intermittent <User Schedule>  celecoxib 200 milliGRAM(s) Oral two times a day  finasteride 5 milliGRAM(s) Oral daily  HYDROmorphone   Tablet 4 milliGRAM(s) Oral every 3 hours PRN  HYDROmorphone  Injectable 0.5 milliGRAM(s) IV Push every 3 hours PRN  lactated ringers. 1000 milliLiter(s) IV Continuous <Continuous>  magnesium hydroxide Suspension 30 milliLiter(s) Oral at bedtime PRN  multivitamin 1 Tablet(s) Oral daily  ondansetron Injectable 4 milliGRAM(s) IV Push every 6 hours PRN  oxyCODONE    IR 5 milliGRAM(s) Oral every 3 hours PRN  oxyCODONE    IR 10 milliGRAM(s) Oral every 3 hours PRN  polyethylene glycol 3350 17 Gram(s) Oral daily  potassium chloride    Tablet ER 20 milliEquivalent(s) Oral daily  senna 2 Tablet(s) Oral at bedtime  tamsulosin 0.8 milliGRAM(s) Oral at bedtime      T(C): 36.3 (12-06-19 @ 23:33), Max: 36.8 (12-06-19 @ 11:11)  HR: 60 (12-06-19 @ 23:33) (60 - 69)  BP: 111/56 (12-06-19 @ 23:33) (109/55 - 130/61)  RR: 18 (12-06-19 @ 23:33) (16 - 22)  SpO2: 94% (12-06-19 @ 23:33) (94% - 97%)  Wt(kg): --    PHYSICAL EXAM:     Constitutional: Alert, responsive, in no acute distress.     Left lower Extremity: Abduction pillow in appropriate position. Dressing: Clean/dry/intact. No active bleeding or discharge noted. SILT L2-S2. Calf soft nontender and compressible. +GSC/TA/EHL/FHL. Dorsalis pedis pulse 2+. BCR.                                                           A/P :  89y Male S/P Left total hip arthroplasty   POD# 0    -  Pain control  -  DVT ppx: ASA 325mg BID   -  PT and out of bed today  -  Postop abx: Ancef  -  Weight bearing status: WBAT of LLE with assistance of a rolling walker  -  Posterior hip precautions   -  Dispo: most likely home in 2 days

## 2019-12-07 NOTE — OCCUPATIONAL THERAPY INITIAL EVALUATION ADULT - PERSONAL SAFETY AND JUDGMENT, REHAB EVAL
at risk behaviors demonstrated/WFL but pt unaware of urine on floor after using urinal with setup earlier./intact

## 2019-12-07 NOTE — OCCUPATIONAL THERAPY INITIAL EVALUATION ADULT - LEVEL OF INDEPENDENCE:TOILET, OT EVAL
pt uses urinal with setup but +urine spill observed, pt unaware./maximum assist (25% patients effort)

## 2019-12-07 NOTE — OCCUPATIONAL THERAPY INITIAL EVALUATION ADULT - ADDITIONAL COMMENTS
Pt lives in private home, a [place] with multiple levels, with 7 AAYUSH, bilateral HR.  He reports he can go in through garage and stay on bottom level to avoid stairs.  Pt drives.  He has a RW, SAC, grab bars in tub, tub seat (sounds like transfer tub bench- wife uses), reacher and sock aide.

## 2019-12-07 NOTE — OCCUPATIONAL THERAPY INITIAL EVALUATION ADULT - LEVEL OF INDEPENDENCE: BED TO CHAIR, REHAB EVAL
Pt with increased thoracic kyphosis during functional mobility./minimum assist (75% patients effort)

## 2019-12-07 NOTE — OCCUPATIONAL THERAPY INITIAL EVALUATION ADULT - ASSISTIVE DEVICE FOR TRANSFER: BED/CHAIR, REHAB EVAL
Problem: Patient Care Overview  Goal: Plan of Care Review  Outcome: Ongoing (interventions implemented as appropriate)   03/12/19 0451   Coping/Psychosocial   Plan of Care Reviewed With patient   Plan of Care Review   Progress no change   OTHER   Outcome Summary transferred from floor after RRT called, patient had increased discomfort, diaphoretic, HR dropped into 40's, and BP dropped into 60-70 per report from RN. dopamine drip initiated, currently at 2. no complaints of chest pain or shortness of breath. bladder scan 171 , patient due to void. improved since admission to CCU, will continue to closely monitor.     Goal: Individualization and Mutuality  Outcome: Ongoing (interventions implemented as appropriate)    Goal: Discharge Needs Assessment  Outcome: Ongoing (interventions implemented as appropriate)    Goal: Interprofessional Rounds/Family Conf  Outcome: Ongoing (interventions implemented as appropriate)      Problem: Cardiac Catheterization (Diagnostic/Interventional) (Adult)  Goal: Signs and Symptoms of Listed Potential Problems Will be Absent, Minimized or Managed (Cardiac Catheterization)  Outcome: Ongoing (interventions implemented as appropriate)         rolling walker

## 2019-12-07 NOTE — DISCHARGE NOTE PROVIDER - NSDCFUADDINST_GEN_ALL_CORE_FT
The patient will be seen in the office between 2-3 weeks for wound check. PLEASE CONTACT OFFICE TO ARRANGE FOLLOW-UP APPOINTMENT DATE. Patient may shower after post-op day #5 (12/11/19) . The dressing is to be removed on post-op day# 7  (12/13/19). IF THE DRESSING BECOMES SOILED BEFORE THE REMOVAL DATE, CHANGE WITH A SIMILAR DRESSING. IF THE DRESSING BECOMES STAINED WITH DISCHARGE, CONTACT THE OFFICE FOR FURTHER DIRECTIONS.  The patient will contact the office if the wound becomes red, has increasing pain, develops bleeding or discharge, an injury occurs, or has other concerns. The patient will continue PT consistent with posterior total hip replacement protocol. The patient will continue to practice posterior total hip precautions for a minimum of 6 weeks. The patient will continue ASPIRIN 325mg twice daily for 6 weeks for blood clot prevention.  The patient will take OXYCODONE AND TYLENOL for pain control and titrate according to prescription and patient needs. The patient will take Colace while taking oxycodone to prevent narcotic associated constipation.  Additionally, increase water intake (drink at least 8 glasses of water daily) and try adding fiber to the diet by eating fruits, vegetables and foods that are rich in grains. If constipation is experienced, contact the medical/primary care provider to discuss further treatment options. The patient is FULL weight bearing.

## 2019-12-07 NOTE — OCCUPATIONAL THERAPY INITIAL EVALUATION ADULT - RANGE OF MOTION EXAMINATION, UPPER EXTREMITY
Bilateral shoulders active ROM to ~100, limited by pain at end range.  Bilateral elbows to digits WFL actively.

## 2019-12-07 NOTE — DISCHARGE NOTE PROVIDER - CARE PROVIDER_API CALL
Mark Mcdermott)  Orthopaedic Surgery  [address], [facility] [address]  [place] [address]  Phone: [phone]  Fax: [phone]  Follow Up Time:

## 2019-12-07 NOTE — PROGRESS NOTE ADULT - SUBJECTIVE AND OBJECTIVE BOX
RADHA PIERRE    [id number]    History: Patient is status post left posterior total hip arthroplasty on POD#1. Patient is doing well. The patient's pain is controlled using the prescribed pain medications. The patient is participating in physical therapy. Denies nausea, vomiting, chest pain, shortness of breath, abdominal pain or fever. No new complaints.    Vital Signs Last 24 Hrs  T(C): 36.3 (06 Dec 2019 23:33), Max: 36.8 (06 Dec 2019 11:11)  T(F): 97.4 (06 Dec 2019 23:33), Max: 98.3 (06 Dec 2019 11:11)  HR: 60 (06 Dec 2019 23:33) (60 - 69)  BP: 111/56 (06 Dec 2019 23:33) (109/55 - 130/61)  BP(mean): --  RR: 18 (06 Dec 2019 23:33) (16 - 22)  SpO2: 94% (06 Dec 2019 23:33) (94% - 97%)                          10.7   8.51  )-----------( 223      ( 07 Dec 2019 08:22 )             33.2             MEDICATIONS  (STANDING):  acetaminophen   Tablet .. 975 milliGRAM(s) Oral every 8 hours  aspirin enteric coated 325 milliGRAM(s) Oral two times a day  celecoxib 200 milliGRAM(s) Oral two times a day  finasteride 5 milliGRAM(s) Oral daily  lactated ringers. 1000 milliLiter(s) (100 mL/Hr) IV Continuous <Continuous>  multivitamin 1 Tablet(s) Oral daily  polyethylene glycol 3350 17 Gram(s) Oral daily  potassium chloride    Tablet ER 20 milliEquivalent(s) Oral daily  senna 2 Tablet(s) Oral at bedtime  tamsulosin 0.8 milliGRAM(s) Oral at bedtime    MEDICATIONS  (PRN):  aluminum hydroxide/magnesium hydroxide/simethicone Suspension 30 milliLiter(s) Oral four times a day PRN Indigestion  HYDROmorphone   Tablet 4 milliGRAM(s) Oral every 3 hours PRN Severe Pain (7 - 10)  HYDROmorphone  Injectable 0.5 milliGRAM(s) IV Push every 3 hours PRN Breakthrough  magnesium hydroxide Suspension 30 milliLiter(s) Oral at bedtime PRN Constipation  ondansetron Injectable 4 milliGRAM(s) IV Push every 6 hours PRN Nausea and/or Vomiting  oxyCODONE    IR 5 milliGRAM(s) Oral every 3 hours PRN Mild Pain (1 - 3)  oxyCODONE    IR 10 milliGRAM(s) Oral every 3 hours PRN Moderate Pain (4 - 6)      Physical exam: Pt sitting in chair. The left hip dressing is clean, dry and intact. No drainage or discharge. No erythema is noted. No blistering. No ecchymosis. The calf is supple nontender. Passive range of motion is acceptable to due postoperative pain. No calf tenderness. Sensation to light touch is grossly intact distally. Motor function distally is 5/5. No foot drop. 2+ dorsalis pedis pulse. Capillary refill is less than 2 seconds. No cyanosis.    Primary Orthopedic Assessment:  • s/p LEFT POSTERIOR total hip replacement    Secondary  Orthopedic Assessment(s):   •     Secondary  Medical Assessment(s):   •     Plan:   • DVT prophylaxis as prescribed, including use of compression devices and ankle pumps  • Continue physical therapy  • Weightbearing as tolerated of the left lower extremity with assistance of a walker  • Incentive spirometry encouraged  • Pain control as clinically indicated  • Posterior hip precautions reviewed with patient  • Discharge planning – anticipated discharge is Home vs [facility]

## 2019-12-07 NOTE — DISCHARGE NOTE PROVIDER - NSDCFUSCHEDAPPT_GEN_ALL_CORE_FT
RADHA PEIRRE ; 01/07/2020 ; [facility]  RADHA PIERRE ; 02/03/2020 ; [facility] [address] RADHA PIERRE ; 01/07/2020 ; [facility]  RADHA PIERRE ; 02/03/2020 ; [facility] [address] RADHA PIERRE ; 01/07/2020 ; [facility]  RADHA PIERRE ; 02/03/2020 ; [facility] [address] RADHA PIERRE ; 01/07/2020 ; [facility]  RADHA PIERRE ; 02/03/2020 ; [facility] [address]

## 2019-12-07 NOTE — CONSULT NOTE ADULT - ASSESSMENT
88 y/o M with Hx of BPH, HLD, HTN, CHF OA, he has left hip pain unrelieved with conservative measures, he came in for scheduled left total hip replacement, s/p procedure post op day 1.     Primary osteoarthritis of left hip: S/p left LHA  #01  Pain control.  PT/OT.  Antibiotics, wound care, and DVT prophylaxis as per Ortho.  Incentive spirometry.  Avoid opioid induced constipation, blood pressure is still low will give gentle hydration, will avoid narcotics and will monitor for fluid over dose.     Essential hypertension.  Recommendation:, will hold off home meds for now as BP is low, will monitor closely.     Hx of CHF: patient is taking Torsemide, will continue to home that as BP is low, will monitor for volume over load.     Hx of BPH: Will monitor for retention.    HLD: Continue Statins.      Need for prophylactic measure: ASA BID as per Ortho.

## 2019-12-08 LAB
ANION GAP SERPL CALC-SCNC: 11 MMOL/L — SIGNIFICANT CHANGE UP (ref 5–17)
BUN SERPL-MCNC: 40 MG/DL — HIGH (ref 8–20)
CALCIUM SERPL-MCNC: 8.2 MG/DL — LOW (ref 8.6–10.2)
CHLORIDE SERPL-SCNC: 100 MMOL/L — SIGNIFICANT CHANGE UP (ref 98–107)
CO2 SERPL-SCNC: 25 MMOL/L — SIGNIFICANT CHANGE UP (ref 22–29)
CREAT SERPL-MCNC: 1.58 MG/DL — HIGH (ref 0.5–1.3)
GLUCOSE SERPL-MCNC: 136 MG/DL — HIGH (ref 70–115)
HCT VFR BLD CALC: 31 % — LOW (ref 39–50)
HGB BLD-MCNC: 10 G/DL — LOW (ref 13–17)
MCHC RBC-ENTMCNC: 29.6 PG — SIGNIFICANT CHANGE UP (ref 27–34)
MCHC RBC-ENTMCNC: 32.3 GM/DL — SIGNIFICANT CHANGE UP (ref 32–36)
MCV RBC AUTO: 91.7 FL — SIGNIFICANT CHANGE UP (ref 80–100)
PLATELET # BLD AUTO: 190 K/UL — SIGNIFICANT CHANGE UP (ref 150–400)
POTASSIUM SERPL-MCNC: 4.8 MMOL/L — SIGNIFICANT CHANGE UP (ref 3.5–5.3)
POTASSIUM SERPL-SCNC: 4.8 MMOL/L — SIGNIFICANT CHANGE UP (ref 3.5–5.3)
RBC # BLD: 3.38 M/UL — LOW (ref 4.2–5.8)
RBC # FLD: 12.6 % — SIGNIFICANT CHANGE UP (ref 10.3–14.5)
SODIUM SERPL-SCNC: 136 MMOL/L — SIGNIFICANT CHANGE UP (ref 135–145)
WBC # BLD: 10.58 K/UL — HIGH (ref 3.8–10.5)
WBC # FLD AUTO: 10.58 K/UL — HIGH (ref 3.8–10.5)

## 2019-12-08 PROCEDURE — 99232 SBSQ HOSP IP/OBS MODERATE 35: CPT

## 2019-12-08 RX ADMIN — Medication 20 MILLIGRAM(S): at 04:33

## 2019-12-08 RX ADMIN — Medication 325 MILLIGRAM(S): at 18:04

## 2019-12-08 RX ADMIN — OXYCODONE HYDROCHLORIDE 5 MILLIGRAM(S): 5 TABLET ORAL at 14:58

## 2019-12-08 RX ADMIN — Medication 975 MILLIGRAM(S): at 15:48

## 2019-12-08 RX ADMIN — Medication 975 MILLIGRAM(S): at 14:59

## 2019-12-08 RX ADMIN — Medication 20 MILLIEQUIVALENT(S): at 12:16

## 2019-12-08 RX ADMIN — CELECOXIB 200 MILLIGRAM(S): 200 CAPSULE ORAL at 04:33

## 2019-12-08 RX ADMIN — Medication 975 MILLIGRAM(S): at 21:18

## 2019-12-08 RX ADMIN — CELECOXIB 200 MILLIGRAM(S): 200 CAPSULE ORAL at 18:33

## 2019-12-08 RX ADMIN — POLYETHYLENE GLYCOL 3350 17 GRAM(S): 17 POWDER, FOR SOLUTION ORAL at 12:16

## 2019-12-08 RX ADMIN — OXYCODONE HYDROCHLORIDE 5 MILLIGRAM(S): 5 TABLET ORAL at 15:49

## 2019-12-08 RX ADMIN — Medication 325 MILLIGRAM(S): at 04:33

## 2019-12-08 RX ADMIN — Medication 1 TABLET(S): at 12:15

## 2019-12-08 RX ADMIN — Medication 975 MILLIGRAM(S): at 04:33

## 2019-12-08 RX ADMIN — LOSARTAN POTASSIUM 50 MILLIGRAM(S): 100 TABLET, FILM COATED ORAL at 04:34

## 2019-12-08 RX ADMIN — Medication 975 MILLIGRAM(S): at 22:18

## 2019-12-08 RX ADMIN — CELECOXIB 200 MILLIGRAM(S): 200 CAPSULE ORAL at 18:04

## 2019-12-08 RX ADMIN — TAMSULOSIN HYDROCHLORIDE 0.8 MILLIGRAM(S): 0.4 CAPSULE ORAL at 21:17

## 2019-12-08 RX ADMIN — CELECOXIB 200 MILLIGRAM(S): 200 CAPSULE ORAL at 04:34

## 2019-12-08 RX ADMIN — Medication 975 MILLIGRAM(S): at 04:34

## 2019-12-08 RX ADMIN — SENNA PLUS 2 TABLET(S): 8.6 TABLET ORAL at 21:18

## 2019-12-08 RX ADMIN — FINASTERIDE 5 MILLIGRAM(S): 5 TABLET, FILM COATED ORAL at 12:15

## 2019-12-08 NOTE — PROGRESS NOTE ADULT - SUBJECTIVE AND OBJECTIVE BOX
RADHA PIERRE    [id number]    89y      Male    Patient is a 89y old  Male who presents with a chief complaint of BORIS (07 Dec 2019 15:59)      INTERVAL HPI/OVERNIGHT EVENTS:    Patient pain is well controlled with pain meds, denies fever, chills, chest pain, nausea, vomiting, SOB.     REVIEW OF SYSTEMS:    CONSTITUTIONAL: No fever, some fatigue  RESPIRATORY: No cough, No shortness of breath  CARDIOVASCULAR: No chest pain, palpitations  GASTROINTESTINAL: No abdominal, No nausea, vomiting  NEUROLOGICAL: No headaches,  loss of strength.  MISCELLANEOUS: left hip pain controlled with pain meds       Vital Signs Last 24 Hrs  T(C): 36.5 (08 Dec 2019 09:22), Max: 36.8 (07 Dec 2019 16:06)  T(F): 97.7 (08 Dec 2019 09:22), Max: 98.3 (07 Dec 2019 16:06)  HR: 63 (08 Dec 2019 09:22) (63 - 100)  BP: 124/65 (08 Dec 2019 09:22) (111/64 - 124/65)  RR: 18 (08 Dec 2019 09:22) (17 - 20)  SpO2: 96% (08 Dec 2019 09:22) (91% - 96%)    PHYSICAL EXAM:      GENERAL: Elderly male looking comfortable   HEENT: PERRL  NECK: soft, Supple, No JVD,   CHEST/LUNG: Clear to auscultate bilaterally; No wheezing  HEART: S1S2+, Regular rate and rhythm; No murmurs  ABDOMEN: Soft, Nontender, Nondistended; Bowel sounds present  EXTREMITIES:  2+ Peripheral Pulses, No edema, left hip Surgical wound with dressings on, no bleeding or soaking.   SKIN: No rashes or lesions  NEURO: AAOX3, no focal deficits, no motor r sensory loss  PSYCH: normal mood      LABS:                        10.0   10.58 )-----------( 190      ( 08 Dec 2019 04:38 )             31.0     12-08    136  |  100  |  40.0<H>  ----------------------------<  136<H>  4.8   |  25.0  |  1.58<H>    Ca    8.2<L>      08 Dec 2019 04:38              I&O's Summary    07 Dec 2019 07:01  -  08 Dec 2019 07:00  --------------------------------------------------------  IN: 0 mL / OUT: 600 mL / NET: -600 mL        MEDICATIONS  (STANDING):  acetaminophen   Tablet .. 975 milliGRAM(s) Oral every 8 hours  aspirin enteric coated 325 milliGRAM(s) Oral two times a day  celecoxib 200 milliGRAM(s) Oral two times a day  finasteride 5 milliGRAM(s) Oral daily  lactated ringers. 1000 milliLiter(s) (100 mL/Hr) IV Continuous <Continuous>  losartan 50 milliGRAM(s) Oral daily  multivitamin 1 Tablet(s) Oral daily  polyethylene glycol 3350 17 Gram(s) Oral daily  potassium chloride    Tablet ER 20 milliEquivalent(s) Oral daily  senna 2 Tablet(s) Oral at bedtime  tamsulosin 0.8 milliGRAM(s) Oral at bedtime  torsemide 20 milliGRAM(s) Oral daily    MEDICATIONS  (PRN):  aluminum hydroxide/magnesium hydroxide/simethicone Suspension 30 milliLiter(s) Oral four times a day PRN Indigestion  bisacodyl Suppository 10 milliGRAM(s) Rectal daily PRN If no bowel movement by POD#2  HYDROmorphone   Tablet 4 milliGRAM(s) Oral every 3 hours PRN Severe Pain (7 - 10)  HYDROmorphone  Injectable 0.5 milliGRAM(s) IV Push every 3 hours PRN Breakthrough  magnesium hydroxide Suspension 30 milliLiter(s) Oral at bedtime PRN Constipation  ondansetron Injectable 4 milliGRAM(s) IV Push every 6 hours PRN Nausea and/or Vomiting  oxyCODONE    IR 5 milliGRAM(s) Oral every 3 hours PRN Mild Pain (1 - 3)  oxyCODONE    IR 10 milliGRAM(s) Oral every 3 hours PRN Moderate Pain (4 - 6)

## 2019-12-08 NOTE — PROGRESS NOTE ADULT - SUBJECTIVE AND OBJECTIVE BOX
RADHA PIERRE    [id number]    History: Patient is status post left posterior total hip arthroplasty on POD#2. Patient is doing well. The patient's pain is controlled using the prescribed pain medications. The patient is participating in physical therapy. Denies nausea, vomiting, chest pain, shortness of breath, abdominal pain or fever. No new complaints.    Vital Signs Last 24 Hrs  T(C): 36.5 (08 Dec 2019 09:22), Max: 36.8 (07 Dec 2019 16:06)  T(F): 97.7 (08 Dec 2019 09:22), Max: 98.3 (07 Dec 2019 16:06)  HR: 63 (08 Dec 2019 09:22) (63 - 100)  BP: 124/65 (08 Dec 2019 09:22) (111/64 - 124/65)  BP(mean): --  RR: 18 (08 Dec 2019 09:22) (17 - 20)  SpO2: 96% (08 Dec 2019 09:22) (91% - 96%)                          10.0   10.58 )-----------( 190      ( 08 Dec 2019 04:38 )             31.0     12-08    136  |  100  |  40.0<H>  ----------------------------<  136<H>  4.8   |  25.0  |  1.58<H>    Ca    8.2<L>      08 Dec 2019 04:38        MEDICATIONS  (STANDING):  acetaminophen   Tablet .. 975 milliGRAM(s) Oral every 8 hours  aspirin enteric coated 325 milliGRAM(s) Oral two times a day  celecoxib 200 milliGRAM(s) Oral two times a day  finasteride 5 milliGRAM(s) Oral daily  lactated ringers. 1000 milliLiter(s) (100 mL/Hr) IV Continuous <Continuous>  losartan 50 milliGRAM(s) Oral daily  multivitamin 1 Tablet(s) Oral daily  polyethylene glycol 3350 17 Gram(s) Oral daily  potassium chloride    Tablet ER 20 milliEquivalent(s) Oral daily  senna 2 Tablet(s) Oral at bedtime  tamsulosin 0.8 milliGRAM(s) Oral at bedtime  torsemide 20 milliGRAM(s) Oral daily    MEDICATIONS  (PRN):  aluminum hydroxide/magnesium hydroxide/simethicone Suspension 30 milliLiter(s) Oral four times a day PRN Indigestion  bisacodyl Suppository 10 milliGRAM(s) Rectal daily PRN If no bowel movement by POD#2  HYDROmorphone   Tablet 4 milliGRAM(s) Oral every 3 hours PRN Severe Pain (7 - 10)  HYDROmorphone  Injectable 0.5 milliGRAM(s) IV Push every 3 hours PRN Breakthrough  magnesium hydroxide Suspension 30 milliLiter(s) Oral at bedtime PRN Constipation  ondansetron Injectable 4 milliGRAM(s) IV Push every 6 hours PRN Nausea and/or Vomiting  oxyCODONE    IR 5 milliGRAM(s) Oral every 3 hours PRN Mild Pain (1 - 3)  oxyCODONE    IR 10 milliGRAM(s) Oral every 3 hours PRN Moderate Pain (4 - 6)      Physical exam: Abduction pillow in place. The left hip dressing is clean, dry and intact. No drainage or discharge. No erythema is noted. No blistering. No ecchymosis. Dressing removed, incision c/d/i, new op site dressing placed. The calf is supple nontender. No calf tenderness. Sensation to light touch is grossly intact distally. Motor function distally is 5/5. No foot drop. 2+ dorsalis pedis pulse. Capillary refill is less than 2 seconds. No cyanosis.    Primary Orthopedic Assessment:  • s/p LEFT POSTERIOR total hip replacement    Secondary  Orthopedic Assessment(s):   •     Secondary  Medical Assessment(s):   •     Plan:   • DVT prophylaxis as prescribed, including use of compression devices and ankle pumps  • Continue physical therapy  • Weightbearing as tolerated of the left lower extremity with assistance of a walker  • Incentive spirometry encouraged  • Pain control as clinically indicated  • Posterior hip precautions reviewed with patient  • Discharge planning – anticipated discharge is [facility]

## 2019-12-08 NOTE — PROGRESS NOTE ADULT - ASSESSMENT
88 y/o M with Hx of BPH, HLD, HTN, CHF OA, he has left hip pain unrelieved with conservative measures, he came in for scheduled left total hip replacement, s/p procedure post op day 2.     Primary osteoarthritis of left hip: S/p left LHA  #02  Pain control.  PT/OT.  Antibiotics, wound care, and DVT prophylaxis as per Ortho.  Incentive spirometry.  Avoid opioid induced constipation, blood pressure better now, no more on IV fluids.     Essential hypertension: hold off losartan as his creatinine went up, will monitor BP closely.    Hx of CHF: looks euvolemic, patient is taking Torsemide, will hold off for now as his creatinine bumped, will monitor for volume over load.     Hx of BPH: Will monitor for retention.    HLD: Continue Statins.      Acute renal failure: Got IV fluids, can not give more as he has Hx of CHF, will continue to hold losartan and torsemide for now, will monitor BMP.     Need for prophylactic measure: ASA BID as per Ortho.

## 2019-12-09 LAB
ANION GAP SERPL CALC-SCNC: 13 MMOL/L — SIGNIFICANT CHANGE UP (ref 5–17)
BUN SERPL-MCNC: 42 MG/DL — HIGH (ref 8–20)
CALCIUM SERPL-MCNC: 9.1 MG/DL — SIGNIFICANT CHANGE UP (ref 8.6–10.2)
CHLORIDE SERPL-SCNC: 98 MMOL/L — SIGNIFICANT CHANGE UP (ref 98–107)
CO2 SERPL-SCNC: 23 MMOL/L — SIGNIFICANT CHANGE UP (ref 22–29)
CREAT SERPL-MCNC: 1.65 MG/DL — HIGH (ref 0.5–1.3)
GLUCOSE SERPL-MCNC: 148 MG/DL — HIGH (ref 70–115)
POTASSIUM SERPL-MCNC: 4.1 MMOL/L — SIGNIFICANT CHANGE UP (ref 3.5–5.3)
POTASSIUM SERPL-SCNC: 4.1 MMOL/L — SIGNIFICANT CHANGE UP (ref 3.5–5.3)
SODIUM SERPL-SCNC: 134 MMOL/L — LOW (ref 135–145)

## 2019-12-09 PROCEDURE — 99233 SBSQ HOSP IP/OBS HIGH 50: CPT

## 2019-12-09 RX ORDER — SODIUM CHLORIDE 9 MG/ML
1000 INJECTION INTRAMUSCULAR; INTRAVENOUS; SUBCUTANEOUS
Refills: 0 | Status: DISCONTINUED | OUTPATIENT
Start: 2019-12-09 | End: 2019-12-10

## 2019-12-09 RX ORDER — OXYCODONE HYDROCHLORIDE 5 MG/1
1 TABLET ORAL
Qty: 0 | Refills: 0 | DISCHARGE
Start: 2019-12-09

## 2019-12-09 RX ORDER — SENNA PLUS 8.6 MG/1
2 TABLET ORAL
Qty: 0 | Refills: 0 | DISCHARGE
Start: 2019-12-09

## 2019-12-09 RX ORDER — CELECOXIB 200 MG/1
1 CAPSULE ORAL
Qty: 0 | Refills: 0 | DISCHARGE
Start: 2019-12-09

## 2019-12-09 RX ORDER — SODIUM CHLORIDE 9 MG/ML
500 INJECTION INTRAMUSCULAR; INTRAVENOUS; SUBCUTANEOUS ONCE
Refills: 0 | Status: COMPLETED | OUTPATIENT
Start: 2019-12-09 | End: 2019-12-09

## 2019-12-09 RX ORDER — POTASSIUM CHLORIDE 20 MEQ
20 PACKET (EA) ORAL DAILY
Refills: 0 | Status: DISCONTINUED | OUTPATIENT
Start: 2019-12-11 | End: 2019-12-10

## 2019-12-09 RX ORDER — IBUPROFEN 200 MG
0 TABLET ORAL
Qty: 90 | Refills: 0 | DISCHARGE

## 2019-12-09 RX ORDER — ASPIRIN/CALCIUM CARB/MAGNESIUM 324 MG
1 TABLET ORAL
Qty: 0 | Refills: 0 | DISCHARGE
Start: 2019-12-09

## 2019-12-09 RX ORDER — ACETAMINOPHEN 500 MG
3 TABLET ORAL
Qty: 0 | Refills: 0 | DISCHARGE
Start: 2019-12-09

## 2019-12-09 RX ORDER — SODIUM CHLORIDE 9 MG/ML
250 INJECTION INTRAMUSCULAR; INTRAVENOUS; SUBCUTANEOUS ONCE
Refills: 0 | Status: DISCONTINUED | OUTPATIENT
Start: 2019-12-09 | End: 2019-12-09

## 2019-12-09 RX ORDER — ASPIRIN/CALCIUM CARB/MAGNESIUM 324 MG
1 TABLET ORAL
Qty: 0 | Refills: 0 | DISCHARGE

## 2019-12-09 RX ADMIN — CELECOXIB 200 MILLIGRAM(S): 200 CAPSULE ORAL at 06:24

## 2019-12-09 RX ADMIN — SODIUM CHLORIDE 250 MILLILITER(S): 9 INJECTION INTRAMUSCULAR; INTRAVENOUS; SUBCUTANEOUS at 13:05

## 2019-12-09 RX ADMIN — Medication 20 MILLIGRAM(S): at 06:20

## 2019-12-09 RX ADMIN — TAMSULOSIN HYDROCHLORIDE 0.8 MILLIGRAM(S): 0.4 CAPSULE ORAL at 21:24

## 2019-12-09 RX ADMIN — LOSARTAN POTASSIUM 50 MILLIGRAM(S): 100 TABLET, FILM COATED ORAL at 06:20

## 2019-12-09 RX ADMIN — Medication 975 MILLIGRAM(S): at 21:24

## 2019-12-09 RX ADMIN — Medication 975 MILLIGRAM(S): at 07:05

## 2019-12-09 RX ADMIN — FINASTERIDE 5 MILLIGRAM(S): 5 TABLET, FILM COATED ORAL at 13:05

## 2019-12-09 RX ADMIN — Medication 975 MILLIGRAM(S): at 17:56

## 2019-12-09 RX ADMIN — Medication 975 MILLIGRAM(S): at 06:20

## 2019-12-09 RX ADMIN — Medication 975 MILLIGRAM(S): at 21:25

## 2019-12-09 RX ADMIN — CELECOXIB 200 MILLIGRAM(S): 200 CAPSULE ORAL at 07:05

## 2019-12-09 RX ADMIN — Medication 325 MILLIGRAM(S): at 06:20

## 2019-12-09 RX ADMIN — Medication 1 TABLET(S): at 13:05

## 2019-12-09 RX ADMIN — Medication 325 MILLIGRAM(S): at 17:56

## 2019-12-09 NOTE — PROGRESS NOTE ADULT - SUBJECTIVE AND OBJECTIVE BOX
Patient seen and examined . S/p L BORIS  , POD # 3 . Pain well controlled , no n/v , this am found to be hypotensive , SBP IN 80'S , felt lightheaded with ambulation , no sob , no cp . Poor oral fluid intake as patient wants to avoid frequent urination .     CC : L hip pain well controlled , lightheadedness       MEDICATIONS  (STANDING):  acetaminophen   Tablet .. 975 milliGRAM(s) Oral every 8 hours  aspirin enteric coated 325 milliGRAM(s) Oral two times a day  celecoxib 200 milliGRAM(s) Oral two times a day  finasteride 5 milliGRAM(s) Oral daily  losartan 50 milliGRAM(s) Oral daily  multivitamin 1 Tablet(s) Oral daily  polyethylene glycol 3350 17 Gram(s) Oral daily  senna 2 Tablet(s) Oral at bedtime  sodium chloride 0.9% Bolus 500 milliLiter(s) IV Bolus once  tamsulosin 0.8 milliGRAM(s) Oral at bedtime    MEDICATIONS  (PRN):  aluminum hydroxide/magnesium hydroxide/simethicone Suspension 30 milliLiter(s) Oral four times a day PRN Indigestion  bisacodyl Suppository 10 milliGRAM(s) Rectal daily PRN If no bowel movement by POD#2  HYDROmorphone   Tablet 4 milliGRAM(s) Oral every 3 hours PRN Severe Pain (7 - 10)  HYDROmorphone  Injectable 0.5 milliGRAM(s) IV Push every 3 hours PRN Breakthrough  magnesium hydroxide Suspension 30 milliLiter(s) Oral at bedtime PRN Constipation  ondansetron Injectable 4 milliGRAM(s) IV Push every 6 hours PRN Nausea and/or Vomiting  oxyCODONE    IR 5 milliGRAM(s) Oral every 3 hours PRN Mild Pain (1 - 3)  oxyCODONE    IR 10 milliGRAM(s) Oral every 3 hours PRN Moderate Pain (4 - 6)      LABS:                          10.0   10.58 )-----------( 190      ( 08 Dec 2019 04:38 )             31.0     12-08    136  |  100  |  40.0<H>  ----------------------------<  136<H>  4.8   |  25.0  |  1.58<H>    Ca    8.2<L>      08 Dec 2019 04:38    RADIOLOGY & ADDITIONAL TESTS:    < from: Xray Hip w/ Pelvis 1 View, Left (12.06.19 @ 17:54) >     EXAM:  XR HIP WITH PELV 1V LT                          PROCEDURE DATE:  12/06/2019          INTERPRETATION:  HISTORY: Total hip replacement    Two views of the LEFT hip are submitted.    Evaluation demonstrates both femoral and acetabular components   well-seated and in good anatomic alignment. There is no evidence of   fracture. The visualized pelvis appears within normal limits.  Impression:  Hip replacement as described above.                  MATTHEW CAAL M.D., ATTENDING RADIOLOGIST  This document has been electronically signed. Dec  7 2019  9:35AM        < end of copied text >            REVIEW OF SYSTEMS:    As above , all other systems reviewed and are negative     Vital Signs Last 24 Hrs  T(C): 36.8 (09 Dec 2019 09:07), Max: 36.9 (08 Dec 2019 16:33)  T(F): 98.2 (09 Dec 2019 09:07), Max: 98.5 (09 Dec 2019 05:38)  HR: 78 (09 Dec 2019 09:07) (72 - 99)  BP: 127/70 (09 Dec 2019 05:38) (123/61 - 130/70)  BP(mean): --  RR: 18 (09 Dec 2019 09:07) (16 - 18)  SpO2: 95% (09 Dec 2019 09:07) (95% - 95%)    PHYSICAL EXAM:    GENERAL: NAD, well-groomed, well-developed  HEAD:  Atraumatic, Normocephalic  EYES: EOMI, PERRLA, conjunctiva and sclera clear  NECK: Supple, No JVD, Normal thyroid  NERVOUS SYSTEM:  Alert & Oriented X3, no focal deficit  CHEST/LUNG: CTA b/l ,  no  rales, rhonchi, wheezing, or rubs  HEART: Regular rate and rhythm; No murmurs, rubs, or gallops  ABDOMEN: Soft, Nontender, Nondistended; Bowel sounds present  EXTREMITIES:  2+ Peripheral Pulses, No clubbing, cyanosis, or edema , L hip pain well controlled   LYMPH: No lymphadenopathy noted  SKIN: No rashes or lesions

## 2019-12-09 NOTE — PROGRESS NOTE ADULT - ASSESSMENT
89 y/o M with Hx of BPH, HLD, HTN, CHF OA, he has left hip pain unrelieved with conservative measures, he came in for scheduled left total hip replacement, s/p procedure post op day 3.     Primary osteoarthritis of left hip: S/p left BORIS   Pain control.  PT/OT.  Antibiotics as per SCIP given , wound care, and DVT prophylaxis as per Ortho.  Incentive spirometry.  Avoid opioid induced constipation, blood pressure better now, no more on IV fluids.     Essential hypertension: Hypotensive episode this am noted , will give 500 ml bolus , patient advised to increase PO fluid intake , hold diuretics / Losartan for now     Hx of CHF unknown type - at present dehydrated , will hold diuretics / Losartan , mild hydration      Hx of BPH: Will monitor for retention.    HLD: Continue Statins.      Acute renal failure , likely due to hypovolemia / dehydration , will give bolus , hold diuretics , patient advised  to increase PO fluid intake , will check BMP in few hrs , avoid nephrotoxic meds , consider to decrease ot to STOP Celebrex     Need for prophylactic measure: ASA BID as per Ortho.     D/C plan is fadia TOUSSAINT today if renal function improves with ivf     d/w patient / nurse / ortho PA / SW .

## 2019-12-09 NOTE — PROGRESS NOTE ADULT - SUBJECTIVE AND OBJECTIVE BOX
Patient seen and examined at bedside. Comfortable in bed, pain controlled. Denies fever/chills, SOB/chest pain, abdominal pain, numbness/tingling. no complaints.    Vital Signs Last 24 Hrs  T(C): 36.9 (09 Dec 2019 05:38), Max: 36.9 (08 Dec 2019 16:33)  T(F): 98.5 (09 Dec 2019 05:38), Max: 98.5 (09 Dec 2019 05:38)  HR: 83 (09 Dec 2019 05:38) (63 - 99)  BP: 127/70 (09 Dec 2019 05:38) (123/61 - 130/70)  BP(mean): --  RR: 18 (09 Dec 2019 05:38) (16 - 18)  SpO2: 95% (09 Dec 2019 05:38) (95% - 96%)    LLE: + serous staining noted to proximal aspect of honeycomb dressing. Removed, incision site healing well. no erythema, no active drainage. + ruptured blisters noted proximally. mepilex dressing applied as per dr coffey, xeroform and gauze dressing placed over blistering. SILT. + dorsi/plantarflexion. ext warm cap refill brisk. calf soft NT B/L.                          10.0   10.58 )-----------( 190      ( 08 Dec 2019 04:38 )             31.0     A/P: 89 y.o M s/p left posterior BORIS POD #3  - dressing changed  - WBAT, posterior hip precautions  - DVTP  - d/c planning - NORBERT today

## 2019-12-10 ENCOUNTER — TRANSCRIPTION ENCOUNTER (OUTPATIENT)
Age: 84
End: 2019-12-10

## 2019-12-10 VITALS
OXYGEN SATURATION: 93 % | TEMPERATURE: 98 F | DIASTOLIC BLOOD PRESSURE: 72 MMHG | RESPIRATION RATE: 18 BRPM | SYSTOLIC BLOOD PRESSURE: 146 MMHG | HEART RATE: 76 BPM

## 2019-12-10 LAB
ANION GAP SERPL CALC-SCNC: 14 MMOL/L — SIGNIFICANT CHANGE UP (ref 5–17)
APPEARANCE UR: CLEAR — SIGNIFICANT CHANGE UP
BACTERIA # UR AUTO: ABNORMAL
BILIRUB UR-MCNC: NEGATIVE — SIGNIFICANT CHANGE UP
BUN SERPL-MCNC: 43 MG/DL — HIGH (ref 8–20)
CALCIUM SERPL-MCNC: 8.9 MG/DL — SIGNIFICANT CHANGE UP (ref 8.6–10.2)
CHLORIDE SERPL-SCNC: 102 MMOL/L — SIGNIFICANT CHANGE UP (ref 98–107)
CO2 SERPL-SCNC: 25 MMOL/L — SIGNIFICANT CHANGE UP (ref 22–29)
COLOR SPEC: YELLOW — SIGNIFICANT CHANGE UP
CREAT SERPL-MCNC: 1.45 MG/DL — HIGH (ref 0.5–1.3)
DIFF PNL FLD: NEGATIVE — SIGNIFICANT CHANGE UP
EPI CELLS # UR: SIGNIFICANT CHANGE UP
GLUCOSE SERPL-MCNC: 114 MG/DL — SIGNIFICANT CHANGE UP (ref 70–115)
GLUCOSE UR QL: NEGATIVE MG/DL — SIGNIFICANT CHANGE UP
HCT VFR BLD CALC: 34.2 % — LOW (ref 39–50)
HGB BLD-MCNC: 10.8 G/DL — LOW (ref 13–17)
KETONES UR-MCNC: NEGATIVE — SIGNIFICANT CHANGE UP
LEUKOCYTE ESTERASE UR-ACNC: ABNORMAL
MCHC RBC-ENTMCNC: 28.9 PG — SIGNIFICANT CHANGE UP (ref 27–34)
MCHC RBC-ENTMCNC: 31.6 GM/DL — LOW (ref 32–36)
MCV RBC AUTO: 91.4 FL — SIGNIFICANT CHANGE UP (ref 80–100)
NITRITE UR-MCNC: NEGATIVE — SIGNIFICANT CHANGE UP
PH UR: 6 — SIGNIFICANT CHANGE UP (ref 5–8)
PLATELET # BLD AUTO: 279 K/UL — SIGNIFICANT CHANGE UP (ref 150–400)
POTASSIUM SERPL-MCNC: 3.4 MMOL/L — LOW (ref 3.5–5.3)
POTASSIUM SERPL-SCNC: 3.4 MMOL/L — LOW (ref 3.5–5.3)
PROT UR-MCNC: 30 MG/DL
RBC # BLD: 3.74 M/UL — LOW (ref 4.2–5.8)
RBC # FLD: 12.6 % — SIGNIFICANT CHANGE UP (ref 10.3–14.5)
RBC CASTS # UR COMP ASSIST: SIGNIFICANT CHANGE UP /HPF (ref 0–4)
SODIUM SERPL-SCNC: 141 MMOL/L — SIGNIFICANT CHANGE UP (ref 135–145)
SP GR SPEC: 1.02 — SIGNIFICANT CHANGE UP (ref 1.01–1.02)
UROBILINOGEN FLD QL: NEGATIVE MG/DL — SIGNIFICANT CHANGE UP
WBC # BLD: 12.37 K/UL — HIGH (ref 3.8–10.5)
WBC # FLD AUTO: 12.37 K/UL — HIGH (ref 3.8–10.5)
WBC UR QL: SIGNIFICANT CHANGE UP

## 2019-12-10 PROCEDURE — C1776: CPT

## 2019-12-10 PROCEDURE — 93005 ELECTROCARDIOGRAM TRACING: CPT

## 2019-12-10 PROCEDURE — 86850 RBC ANTIBODY SCREEN: CPT

## 2019-12-10 PROCEDURE — 97110 THERAPEUTIC EXERCISES: CPT

## 2019-12-10 PROCEDURE — 97530 THERAPEUTIC ACTIVITIES: CPT

## 2019-12-10 PROCEDURE — C1713: CPT

## 2019-12-10 PROCEDURE — 86901 BLOOD TYPING SEROLOGIC RH(D): CPT

## 2019-12-10 PROCEDURE — 86900 BLOOD TYPING SEROLOGIC ABO: CPT

## 2019-12-10 PROCEDURE — G0463: CPT

## 2019-12-10 PROCEDURE — 71046 X-RAY EXAM CHEST 2 VIEWS: CPT

## 2019-12-10 PROCEDURE — 36415 COLL VENOUS BLD VENIPUNCTURE: CPT

## 2019-12-10 PROCEDURE — 87086 URINE CULTURE/COLONY COUNT: CPT

## 2019-12-10 PROCEDURE — 80048 BASIC METABOLIC PNL TOTAL CA: CPT

## 2019-12-10 PROCEDURE — 87641 MR-STAPH DNA AMP PROBE: CPT

## 2019-12-10 PROCEDURE — 73501 X-RAY EXAM HIP UNI 1 VIEW: CPT

## 2019-12-10 PROCEDURE — 82962 GLUCOSE BLOOD TEST: CPT

## 2019-12-10 PROCEDURE — 97167 OT EVAL HIGH COMPLEX 60 MIN: CPT

## 2019-12-10 PROCEDURE — 85730 THROMBOPLASTIN TIME PARTIAL: CPT

## 2019-12-10 PROCEDURE — 99233 SBSQ HOSP IP/OBS HIGH 50: CPT

## 2019-12-10 PROCEDURE — 97116 GAIT TRAINING THERAPY: CPT

## 2019-12-10 PROCEDURE — 81001 URINALYSIS AUTO W/SCOPE: CPT

## 2019-12-10 PROCEDURE — 85027 COMPLETE CBC AUTOMATED: CPT

## 2019-12-10 PROCEDURE — 85610 PROTHROMBIN TIME: CPT

## 2019-12-10 PROCEDURE — 97163 PT EVAL HIGH COMPLEX 45 MIN: CPT

## 2019-12-10 PROCEDURE — 87640 STAPH A DNA AMP PROBE: CPT

## 2019-12-10 PROCEDURE — 83036 HEMOGLOBIN GLYCOSYLATED A1C: CPT

## 2019-12-10 RX ORDER — SODIUM CHLORIDE 9 MG/ML
1000 INJECTION INTRAMUSCULAR; INTRAVENOUS; SUBCUTANEOUS
Refills: 0 | Status: DISCONTINUED | OUTPATIENT
Start: 2019-12-10 | End: 2019-12-10

## 2019-12-10 RX ORDER — POTASSIUM CHLORIDE 20 MEQ
20 PACKET (EA) ORAL
Refills: 0 | Status: DISCONTINUED | OUTPATIENT
Start: 2019-12-10 | End: 2019-12-10

## 2019-12-10 RX ORDER — CALCIUM CARBONATE 500(1250)
1 TABLET ORAL ONCE
Refills: 0 | Status: DISCONTINUED | OUTPATIENT
Start: 2019-12-10 | End: 2019-12-10

## 2019-12-10 RX ADMIN — Medication 30 MILLILITER(S): at 11:52

## 2019-12-10 RX ADMIN — Medication 975 MILLIGRAM(S): at 05:43

## 2019-12-10 RX ADMIN — FINASTERIDE 5 MILLIGRAM(S): 5 TABLET, FILM COATED ORAL at 11:52

## 2019-12-10 RX ADMIN — SODIUM CHLORIDE 75 MILLILITER(S): 9 INJECTION INTRAMUSCULAR; INTRAVENOUS; SUBCUTANEOUS at 00:17

## 2019-12-10 RX ADMIN — OXYCODONE HYDROCHLORIDE 10 MILLIGRAM(S): 5 TABLET ORAL at 00:16

## 2019-12-10 RX ADMIN — Medication 325 MILLIGRAM(S): at 05:41

## 2019-12-10 RX ADMIN — Medication 975 MILLIGRAM(S): at 05:41

## 2019-12-10 RX ADMIN — OXYCODONE HYDROCHLORIDE 10 MILLIGRAM(S): 5 TABLET ORAL at 01:16

## 2019-12-10 RX ADMIN — LOSARTAN POTASSIUM 50 MILLIGRAM(S): 100 TABLET, FILM COATED ORAL at 05:41

## 2019-12-10 RX ADMIN — Medication 20 MILLIEQUIVALENT(S): at 11:52

## 2019-12-10 RX ADMIN — Medication 1 TABLET(S): at 11:53

## 2019-12-10 NOTE — DISCHARGE NOTE NURSING/CASE MANAGEMENT/SOCIAL WORK - PATIENT PORTAL LINK FT
You can access the FollowMyHealth Patient Portal offered by [facility] by registering at the following website: [email or web address]. By joining [facility]’s FollowMyHealth portal, you will also be able to view your health information using other applications (apps) compatible with our system.

## 2019-12-10 NOTE — PROGRESS NOTE ADULT - SUBJECTIVE AND OBJECTIVE BOX
Patient seen and examined . S/p L BORIS  , POD # 4 . [place] , pain well controlled , c/o 4 episodes of watery stools for 2 4 hrs , last episode was at 2 am , no n/v , voiding without difficulties .     CC : L hip pain well controlled , diarrhea         MEDICATIONS  (STANDING):  acetaminophen   Tablet .. 975 milliGRAM(s) Oral every 8 hours  aspirin enteric coated 325 milliGRAM(s) Oral two times a day  finasteride 5 milliGRAM(s) Oral daily  losartan 50 milliGRAM(s) Oral daily  multivitamin 1 Tablet(s) Oral daily  polyethylene glycol 3350 17 Gram(s) Oral daily  potassium chloride    Tablet ER 20 milliEquivalent(s) Oral every 2 hours  sodium chloride 0.9%. 1000 milliLiter(s) (100 mL/Hr) IV Continuous <Continuous>  tamsulosin 0.8 milliGRAM(s) Oral at bedtime    MEDICATIONS  (PRN):  aluminum hydroxide/magnesium hydroxide/simethicone Suspension 30 milliLiter(s) Oral four times a day PRN Indigestion  HYDROmorphone   Tablet 4 milliGRAM(s) Oral every 3 hours PRN Severe Pain (7 - 10)  HYDROmorphone  Injectable 0.5 milliGRAM(s) IV Push every 3 hours PRN Breakthrough  magnesium hydroxide Suspension 30 milliLiter(s) Oral at bedtime PRN Constipation  ondansetron Injectable 4 milliGRAM(s) IV Push every 6 hours PRN Nausea and/or Vomiting  oxyCODONE    IR 5 milliGRAM(s) Oral every 3 hours PRN Mild Pain (1 - 3)  oxyCODONE    IR 10 milliGRAM(s) Oral every 3 hours PRN Moderate Pain (4 - 6)      LABS:                          10.8   12.37 )-----------( 279      ( 10 Dec 2019 06:46 )             34.2     12-10    141  |  102  |  43.0<H>  ----------------------------<  114  3.4<L>   |  25.0  |  1.45<H>    Ca    8.9      10 Dec 2019 06:46      Urine Microscopic-Add On (NC) (12.10.19 @ 10:33)    Red Blood Cell - Urine: 0-2 /HPF    White Blood Cell - Urine: 0-2    Bacteria: Occasional    Epithelial Cells: Occasional  Urinalysis (12.10.19 @ 10:33)    Glucose Qualitative, Urine: Negative mg/dL    Blood, Urine: Negative    pH Urine: 6.0    Color: Yellow    Urine Appearance: Clear    Bilirubin: Negative    Ketone - Urine: Negative    Specific Gravity: 1.020    Protein, Urine: 30 mg/dL    Urobilinogen: Negative mg/dL    Nitrite: Negative    Leukocyte Esterase Concentration: Trace              Urinalysis Basic - ( 10 Dec 2019 10:33 )    Color: Yellow / Appearance: Clear / S.020 / pH: x  Gluc: x / Ketone: Negative  / Bili: Negative / Urobili: Negative mg/dL   Blood: x / Protein: 30 mg/dL / Nitrite: Negative   Leuk Esterase: Trace / RBC: x / WBC x   Sq Epi: x / Non Sq Epi: x / Bacteria: x        RADIOLOGY & ADDITIONAL TESTS:    < from: Xray Hip w/ Pelvis 1 View, Left (19 @ 17:54) >     EXAM:  XR HIP WITH PELV 1V LT                          PROCEDURE DATE:  2019          INTERPRETATION:  HISTORY: Total hip replacement    Two views of the LEFT hip are submitted.    Evaluation demonstrates both femoral and acetabular components   well-seated and in good anatomic alignment. There is no evidence of   fracture. The visualized pelvis appears within normal limits.  Impression:  Hip replacement as described above.                  MATTHEW CAAL M.D., ATTENDING RADIOLOGIST  This document has been electronically signed. Dec  7 2019  9:35AM        < end of copied text >        REVIEW OF SYSTEMS:    As above , all other systems reviewed and are negative .     Vital Signs Last 24 Hrs  T(C): 36.7 (10 Dec 2019 09:44), Max: 37.1 (10 Dec 2019 04:57)  T(F): 98.1 (10 Dec 2019 09:44), Max: 98.8 (10 Dec 2019 04:57)  HR: 76 (10 Dec 2019 09:44) (74 - 80)  BP: 146/72 (10 Dec 2019 09:44) (120/67 - 146/72)  BP(mean): --  RR: 18 (10 Dec 2019 09:44) (18 - 19)  SpO2: 93% (10 Dec 2019 09:44) (93% - 94%)    PHYSICAL EXAM:    GENERAL: NAD, well-groomed, well-developed, [place]   HEAD:  Atraumatic, Normocephalic  EYES: EOMI, PERRLA, conjunctiva and sclera clear  NECK: Supple, No JVD, Normal thyroid  NERVOUS SYSTEM:  Alert & Oriented X3, no focal deficit  CHEST/LUNG: CTA b/l ,  no  rales, rhonchi, wheezing, or rubs  HEART: Regular rate and rhythm; No murmurs, rubs, or gallops  ABDOMEN: Soft, Nontender, Nondistended; Bowel sounds present  EXTREMITIES:  2+ Peripheral Pulses, No clubbing, cyanosis, or edema , L hip dressing + , clean and dry   LYMPH: No lymphadenopathy noted  SKIN: No rashes or lesions

## 2019-12-10 NOTE — PROGRESS NOTE ADULT - ASSESSMENT
88 y/o M with Hx of BPH, HLD, HTN, b/l pedal edema , CKD ( base Cr 1.4 )  OA . S/P  L BORIS , POD #  4 . Pain well controlled , had 4 episodes of watery stools x 24 hrs , last BM at 2 am , Miralax / Senna on hold . Yesterday noted to be Hypotensive , NS bolus of 500 ml was given , ivf continued , diuretics on hold , Celebrex d/debra . Today renal function better ( Cr 1.7 --. 1.4 ) , Bladder scan without PVR . Leucocytosis noted  , UA / Urine cultures sent . UA - normal , UTI r/o . Plan is to hold on Torsemide for 2 days , increase PO fluids , follow up renal function and if stable may restart Torsemide in 2 days . Hold Miralax / Senna for now due to diarrhea . HgA1C - 6.2 - prediabetes , ADA diet , follow up with PMD for further recommendation .      Primary osteoarthritis of left hip: S/p left BORIS   Pain control.  PT/OT.  Antibiotics as per SCIP given , wound care, and DVT prophylaxis as per Ortho.  Incentive spirometry.      Essential hypertension: Hypotensive episode POD # 3  noted , bolus given , ivf continued . BP well controlled .    As per cardiology outpatient notes patient has no Hx of CHF , on diuretics for pedal edema -      Hx of BPH: continue Home meds , asymptomatic     HLD: Continue Statins.      Acute renal failure , on CKD - likely stage 3   due to hypovolemia / dehydration ,  bolus given yesterday, ivf continued , nephrotoxic meds avoided , ( Celebrex d/c ) , Torsemide on hold  , Today renal function better , will hold Torsemide for 2 days , increase PO fluid intake , follow up renal function and if stable may restart Torsemide .     Need for prophylactic measure: ASA BID as per Ortho.     Hypokalemia - will supplement .     Diarrhea - likely due to bowel regimen  - Miralax / Senna on hold , no diarrhea since last night      Medically stable to d/c once cleared by physical therapy / ortho .     d/w patient / nurse / ortho PA / SW .

## 2019-12-10 NOTE — PROGRESS NOTE ADULT - SUBJECTIVE AND OBJECTIVE BOX
RADHA PIERRE    [id number]    History: Patient is status post left posterior total hip arthroplasty, POD#4. Patient is doing well. The patient's pain is controlled using the prescribed pain medications. The patient is participating in physical therapy. He ambulated down lamb with assistance. Denies nausea, vomiting, chest pain, shortness of breath, abdominal pain or dizziness. +BM. No new complaints.                              10.8   12.37 )-----------( x        ( 10 Dec 2019 06:46 )             34.2     12-09    134<L>  |  98  |  42.0<H>  ----------------------------<  148<H>  4.1   |  23.0  |  1.65<H>    Ca    9.1      09 Dec 2019 11:14        MEDICATIONS  (STANDING):  acetaminophen   Tablet .. 975 milliGRAM(s) Oral every 8 hours  aspirin enteric coated 325 milliGRAM(s) Oral two times a day  finasteride 5 milliGRAM(s) Oral daily  losartan 50 milliGRAM(s) Oral daily  multivitamin 1 Tablet(s) Oral daily  polyethylene glycol 3350 17 Gram(s) Oral daily  senna 2 Tablet(s) Oral at bedtime  sodium chloride 0.9%. 1000 milliLiter(s) (75 mL/Hr) IV Continuous <Continuous>  tamsulosin 0.8 milliGRAM(s) Oral at bedtime    MEDICATIONS  (PRN):  aluminum hydroxide/magnesium hydroxide/simethicone Suspension 30 milliLiter(s) Oral four times a day PRN Indigestion  bisacodyl Suppository 10 milliGRAM(s) Rectal daily PRN If no bowel movement by POD#2  HYDROmorphone   Tablet 4 milliGRAM(s) Oral every 3 hours PRN Severe Pain (7 - 10)  HYDROmorphone  Injectable 0.5 milliGRAM(s) IV Push every 3 hours PRN Breakthrough  magnesium hydroxide Suspension 30 milliLiter(s) Oral at bedtime PRN Constipation  ondansetron Injectable 4 milliGRAM(s) IV Push every 6 hours PRN Nausea and/or Vomiting  oxyCODONE    IR 5 milliGRAM(s) Oral every 3 hours PRN Mild Pain (1 - 3)  oxyCODONE    IR 10 milliGRAM(s) Oral every 3 hours PRN Moderate Pain (4 - 6)    Vital Signs Last 24 Hrs  T(C): 37.1 (10 Dec 2019 04:57), Max: 37.1 (10 Dec 2019 04:57)  T(F): 98.8 (10 Dec 2019 04:57), Max: 98.8 (10 Dec 2019 04:57)  HR: 80 (10 Dec 2019 04:57) (74 - 80)  BP: 144/73 (10 Dec 2019 04:57) (120/67 - 144/73)  BP(mean): --  RR: 19 (10 Dec 2019 04:57) (18 - 19)  SpO2: 94% (10 Dec 2019 04:57) (93% - 95%)  Lying in bed in NAD, awake and alert  Physical exam: Left lower extremity- The left hip mepilex dressing is clean, dry and intact. No drainage or discharge. No erythema is noted. No blistering. No ecchymosis. The calf is supple. No calf tenderness. Sensation to light touch is grossly intact distally. Motor function distally is 5/5. No foot drop. +DF/PF. 2+ dorsalis pedis pulse. Capillary refill is less than 2 seconds. No cyanosis.    Primary Orthopedic Assessment:  • s/p LEFT POSTERIOR total hip replacement, POD#4    Secondary  Medical Assessment(s):   • Decreased kidney function    Plan:   - AM labs pending  • DVT prophylaxis as prescribed- asa 325mg bid, including use of compression devices and ankle pumps  • Continue physical therapy  • Weightbearing as tolerated of the left lower extremity with assistance of a walker  • Incentive spirometry encouraged  • Pain control as clinically indicated  • Posterior hip precautions reviewed with patient  • Discharge planning – anticipated discharge is subacute rehabilitation when cleared by medicine

## 2019-12-10 NOTE — PROGRESS NOTE ADULT - ATTENDING COMMENTS
Maintained inpatient by medicine for decreased renal function.  Cr improved today.  Hb stable.  Left hip dressing dry.  NORBERT pending medical clearance for discharge.

## 2019-12-11 LAB
CULTURE RESULTS: NO GROWTH — SIGNIFICANT CHANGE UP
SPECIMEN SOURCE: SIGNIFICANT CHANGE UP

## 2019-12-30 ENCOUNTER — OTHER (OUTPATIENT)
Age: 84
End: 2019-12-30

## 2020-01-07 ENCOUNTER — APPOINTMENT (OUTPATIENT)
Dept: ORTHOPEDIC SURGERY | Facility: CLINIC | Age: 85
End: 2020-01-07
Payer: MEDICARE

## 2020-01-07 VITALS
WEIGHT: 210 LBS | HEART RATE: 67 BPM | HEIGHT: 72 IN | SYSTOLIC BLOOD PRESSURE: 149 MMHG | BODY MASS INDEX: 28.44 KG/M2 | DIASTOLIC BLOOD PRESSURE: 65 MMHG

## 2020-01-07 PROBLEM — I10 ESSENTIAL (PRIMARY) HYPERTENSION: Chronic | Status: ACTIVE | Noted: 2019-12-03

## 2020-01-07 PROBLEM — H91.90 UNSPECIFIED HEARING LOSS, UNSPECIFIED EAR: Chronic | Status: ACTIVE | Noted: 2019-12-03

## 2020-01-07 PROBLEM — N40.0 BENIGN PROSTATIC HYPERPLASIA WITHOUT LOWER URINARY TRACT SYMPTOMS: Chronic | Status: ACTIVE | Noted: 2019-12-03

## 2020-01-07 PROBLEM — C43.9 MALIGNANT MELANOMA OF SKIN, UNSPECIFIED: Chronic | Status: ACTIVE | Noted: 2019-12-03

## 2020-01-07 PROBLEM — M19.90 UNSPECIFIED OSTEOARTHRITIS, UNSPECIFIED SITE: Chronic | Status: ACTIVE | Noted: 2019-12-03

## 2020-01-07 PROBLEM — E78.5 HYPERLIPIDEMIA, UNSPECIFIED: Chronic | Status: ACTIVE | Noted: 2019-12-03

## 2020-01-07 PROCEDURE — 99024 POSTOP FOLLOW-UP VISIT: CPT

## 2020-01-07 PROCEDURE — 73502 X-RAY EXAM HIP UNI 2-3 VIEWS: CPT | Mod: LT

## 2020-01-07 RX ORDER — ASPIRIN ENTERIC COATED TABLETS 81 MG 81 MG/1
81 TABLET, DELAYED RELEASE ORAL
Refills: 0 | Status: DISCONTINUED | COMMUNITY
End: 2020-01-07

## 2020-01-07 RX ORDER — ASPIRIN 325 MG/1
325 TABLET, COATED ORAL
Refills: 0 | Status: ACTIVE | COMMUNITY

## 2020-01-07 NOTE — HISTORY OF PRESENT ILLNESS
[Doing Well] : is doing well [Excellent Pain Control] : has excellent pain control [No Sign of Infection] : is showing no signs of infection [Erythema] : not erythematous [Discharge] : absent of discharge [Dehiscence] : not dehisced [de-identified] : S/P Left posterior THR, DOS: 12/6/19. [de-identified] : The patient is a 89 year old male 4-1/2 weeks s/p left posterior THR. He is ambulating and transferring well with a walker. He reports he will continue home physical therapy for the next month. For DVT prophylaxis he is on aspirin twice daily. Pain is controlled well with Tylenol. He reports having discontinued oxycodone at this time. He denies systemic symptoms of fever or chills. He denies drainage from the incision site.  [de-identified] : Xray- AP pelvis and 2 views of the left hip shows a hip replacement in stable position, without sign of fracture or dislocation.  [de-identified] : Exam of the left hip shows a well healing incision. 5/5 motor strength bilaterally distally. Sensation intact distally. LFCN intact.  [de-identified] : The patient is doing very well 4-1/2 weeks after left posterior total hip replacement. The patient will be transitioned to outpatient physical therapy once home PT has concluded and a prescription was given for that. The patient will continue aspirin 325 mg twice per day for DVT prophylaxis for the  next 2 weeks. Posterior hip precautions were reinforced. Overall the patient is very happy with their outcome so far. Followup in 6 weeks with repeat x-rays.\par

## 2020-01-07 NOTE — ADDENDUM
[FreeTextEntry1] : This note was authored by Ted Bonilla working as a medical scribe for Dr. Mark Mcdermott. The note was reviewed, edited, and revised by Dr. Mark Mcdermott whom is in agreement with the exam findings, imaging findings, and treatment plan. 01/07/2020.

## 2020-02-07 ENCOUNTER — APPOINTMENT (OUTPATIENT)
Dept: ORTHOPEDIC SURGERY | Facility: CLINIC | Age: 85
End: 2020-02-07
Payer: MEDICARE

## 2020-02-07 VITALS
SYSTOLIC BLOOD PRESSURE: 111 MMHG | DIASTOLIC BLOOD PRESSURE: 54 MMHG | HEIGHT: 72 IN | HEART RATE: 90 BPM | BODY MASS INDEX: 28.44 KG/M2 | WEIGHT: 210 LBS

## 2020-02-07 PROCEDURE — 99024 POSTOP FOLLOW-UP VISIT: CPT

## 2020-02-07 PROCEDURE — 73502 X-RAY EXAM HIP UNI 2-3 VIEWS: CPT | Mod: 26,LT

## 2020-02-07 NOTE — HISTORY OF PRESENT ILLNESS
[de-identified] : S/P Left posterior THR, DOS: 12/6/19. [de-identified] : The patient is an 89-year-old male 9 weeks status post left posterior approach THR. He is doing well and is pleased with the results of his surgery thus far. He has concluded aspirin for DVT prophylaxis and returned to his baby aspirin dosage. He denies systemic symptoms of fever or chills. He's noting no significant complaints of pain. He concluded physical therapy at home but has not begun outpatient physical therapy as yet. He is transferring and ambulating with a walker. [de-identified] : The left hip is without erythema, abrasions, or ecchymosis. The incision is healed. There are no signs or symptoms of infection. No pain with palpation of the hip. Functional range of passive, smooth, pain-free range of motion. Straight leg raise without difficulty. Calf is nontender Homans test is negative. [de-identified] : X-ray-AP of the pelvis and 2 views of the left hip show the prosthesis to remain in satisfactory alignment. There is no evidence of fracture or dislocation. [de-identified] : The patient is an 89-year-old male 9 weeks status post left posterior approach THR. Incision is healed. He is pleased with the results of his surgery thus far. [de-identified] : The patient is doing well 9 weeks following posterior total hip replacement. Posterior hip precautions were reviewed and recommended to be followed for another 4 weeks.  The patient was encouraged to begin outpatient physical therapy and gradually transition to a home exercise program over the next 6 weeks.   Dental prophylaxis was reviewed.  The patient is doing well so far and overall very happy with their progress.  Followup in 4 weeks.

## 2020-02-25 ENCOUNTER — APPOINTMENT (OUTPATIENT)
Dept: ORTHOPEDIC SURGERY | Facility: CLINIC | Age: 85
End: 2020-02-25

## 2020-03-03 ENCOUNTER — APPOINTMENT (OUTPATIENT)
Dept: ORTHOPEDIC SURGERY | Facility: CLINIC | Age: 85
End: 2020-03-03
Payer: MEDICARE

## 2020-03-03 VITALS
DIASTOLIC BLOOD PRESSURE: 57 MMHG | SYSTOLIC BLOOD PRESSURE: 95 MMHG | HEIGHT: 72 IN | WEIGHT: 210 LBS | BODY MASS INDEX: 28.44 KG/M2 | HEART RATE: 75 BPM

## 2020-03-03 DIAGNOSIS — Z47.1 AFTERCARE FOLLOWING JOINT REPLACEMENT SURGERY: ICD-10-CM

## 2020-03-03 DIAGNOSIS — Z96.649 PRESENCE OF UNSPECIFIED ARTIFICIAL HIP JOINT: ICD-10-CM

## 2020-03-03 DIAGNOSIS — Z96.642 AFTERCARE FOLLOWING JOINT REPLACEMENT SURGERY: ICD-10-CM

## 2020-03-03 PROCEDURE — 99024 POSTOP FOLLOW-UP VISIT: CPT

## 2020-03-03 PROCEDURE — 73502 X-RAY EXAM HIP UNI 2-3 VIEWS: CPT | Mod: LT

## 2020-03-03 NOTE — ADDENDUM
[FreeTextEntry1] : This note was authored by Ted Bonilla working as a medical scribe for Dr. Mark Mcdermott. The note was reviewed, edited, and revised by Dr. Mark Mcdermott whom is in agreement with the exam findings, imaging findings, and treatment plan. 03/03/2020.

## 2020-03-03 NOTE — HISTORY OF PRESENT ILLNESS
[Doing Well] : is doing well [Excellent Pain Control] : has excellent pain control [No Sign of Infection] : is showing no signs of infection [Erythema] : not erythematous [Dehiscence] : not dehisced [Discharge] : absent of discharge [de-identified] : S/P Left posterior THR, DOS: 12/6/19.  [de-identified] : Xray- AP pelvis and 2 views of the left hip shows a hip replacement in stable position, without sign of fracture or dislocation.  [de-identified] : Exam of the left hip shows a well healed incision.  Range of motion of the hip while sitting shows no pain.  Difficult to lie flat because of severe kyphosis.  He is able to ambulate independently with a walker.  He denies any pain in the left hip while walking. [de-identified] : The patient is a 89 year old male who presents for followup of his left posterior approach THR. He is now 3 months postop. He is pleased with the results of his surgery. He is noting no groin pain. He will note occasional lateral hip pain. This is persistent but not progressive. He continues with physical therapy. He is transferring and ambulating with a walker. [de-identified] : The patient is a 89 year old male 3 months s/p left posterior THR. He will continue physical therapy and gradually transition to home exercises. Overall, he is very happy with the results of the surgery. Dental prophylaxis was advised. Follow up one year post op for radiographic surveillance.

## 2020-03-05 ENCOUNTER — FORM ENCOUNTER (OUTPATIENT)
Age: 85
End: 2020-03-05

## 2020-04-09 PROBLEM — M25.559 HIP PAIN: Status: ACTIVE | Noted: 2019-09-23

## 2020-05-18 ENCOUNTER — INPATIENT (INPATIENT)
Facility: HOSPITAL | Age: 85
LOS: 7 days | Discharge: HOME | DRG: 356 | End: 2020-05-26
Attending: FAMILY MEDICINE | Admitting: HOSPITALIST
Payer: MEDICARE

## 2020-05-18 VITALS
SYSTOLIC BLOOD PRESSURE: 83 MMHG | WEIGHT: 195.11 LBS | HEIGHT: 74 IN | DIASTOLIC BLOOD PRESSURE: 36 MMHG | RESPIRATION RATE: 18 BRPM | HEART RATE: 61 BPM | TEMPERATURE: 98 F | OXYGEN SATURATION: 97 %

## 2020-05-18 DIAGNOSIS — Z98.890 OTHER SPECIFIED POSTPROCEDURAL STATES: Chronic | ICD-10-CM

## 2020-05-18 DIAGNOSIS — Z98.49 CATARACT EXTRACTION STATUS, UNSPECIFIED EYE: Chronic | ICD-10-CM

## 2020-05-18 DIAGNOSIS — R17 UNSPECIFIED JAUNDICE: ICD-10-CM

## 2020-05-18 LAB
ALBUMIN SERPL ELPH-MCNC: 3.1 G/DL — LOW (ref 3.3–5.2)
ALP SERPL-CCNC: 523 U/L — HIGH (ref 40–120)
ALT FLD-CCNC: 116 U/L — HIGH
ANION GAP SERPL CALC-SCNC: 16 MMOL/L — SIGNIFICANT CHANGE UP (ref 5–17)
APPEARANCE UR: ABNORMAL
APTT BLD: 31.1 SEC — SIGNIFICANT CHANGE UP (ref 27.5–36.3)
AST SERPL-CCNC: 64 U/L — HIGH
BACTERIA # UR AUTO: ABNORMAL
BASOPHILS # BLD AUTO: 0.04 K/UL — SIGNIFICANT CHANGE UP (ref 0–0.2)
BASOPHILS NFR BLD AUTO: 0.3 % — SIGNIFICANT CHANGE UP (ref 0–2)
BILIRUB DIRECT SERPL-MCNC: 8.6 MG/DL — HIGH (ref 0–0.3)
BILIRUB INDIRECT FLD-MCNC: 0.9 MG/DL — SIGNIFICANT CHANGE UP (ref 0.2–1)
BILIRUB SERPL-MCNC: 9.5 MG/DL — HIGH (ref 0.4–2)
BILIRUB SERPL-MCNC: 9.5 MG/DL — HIGH (ref 0.4–2)
BILIRUB UR-MCNC: ABNORMAL
BUN SERPL-MCNC: 54 MG/DL — HIGH (ref 8–20)
CALCIUM SERPL-MCNC: 9.5 MG/DL — SIGNIFICANT CHANGE UP (ref 8.6–10.2)
CHLORIDE SERPL-SCNC: 98 MMOL/L — SIGNIFICANT CHANGE UP (ref 98–107)
CO2 SERPL-SCNC: 22 MMOL/L — SIGNIFICANT CHANGE UP (ref 22–29)
COD CRY URNS QL: ABNORMAL
COLOR SPEC: YELLOW — SIGNIFICANT CHANGE UP
CREAT SERPL-MCNC: 1.78 MG/DL — HIGH (ref 0.5–1.3)
DIFF PNL FLD: ABNORMAL
EOSINOPHIL # BLD AUTO: 0.03 K/UL — SIGNIFICANT CHANGE UP (ref 0–0.5)
EOSINOPHIL NFR BLD AUTO: 0.3 % — SIGNIFICANT CHANGE UP (ref 0–6)
EPI CELLS # UR: ABNORMAL
GLUCOSE SERPL-MCNC: 124 MG/DL — HIGH (ref 70–99)
GLUCOSE UR QL: NEGATIVE MG/DL — SIGNIFICANT CHANGE UP
HCT VFR BLD CALC: 32.3 % — LOW (ref 39–50)
HGB BLD-MCNC: 10.3 G/DL — LOW (ref 13–17)
IMM GRANULOCYTES NFR BLD AUTO: 0.4 % — SIGNIFICANT CHANGE UP (ref 0–1.5)
INR BLD: 1.09 RATIO — SIGNIFICANT CHANGE UP (ref 0.88–1.16)
KETONES UR-MCNC: NEGATIVE — SIGNIFICANT CHANGE UP
LACTATE BLDV-MCNC: 0.7 MMOL/L — SIGNIFICANT CHANGE UP (ref 0.5–2)
LACTATE BLDV-MCNC: 3.1 MMOL/L — HIGH (ref 0.5–2)
LEUKOCYTE ESTERASE UR-ACNC: ABNORMAL
LIDOCAIN IGE QN: >3000 U/L — HIGH (ref 22–51)
LYMPHOCYTES # BLD AUTO: 0.79 K/UL — LOW (ref 1–3.3)
LYMPHOCYTES # BLD AUTO: 6.6 % — LOW (ref 13–44)
MAGNESIUM SERPL-MCNC: 2.8 MG/DL — HIGH (ref 1.6–2.6)
MCHC RBC-ENTMCNC: 29.3 PG — SIGNIFICANT CHANGE UP (ref 27–34)
MCHC RBC-ENTMCNC: 31.9 GM/DL — LOW (ref 32–36)
MCV RBC AUTO: 91.8 FL — SIGNIFICANT CHANGE UP (ref 80–100)
MONOCYTES # BLD AUTO: 0.75 K/UL — SIGNIFICANT CHANGE UP (ref 0–0.9)
MONOCYTES NFR BLD AUTO: 6.3 % — SIGNIFICANT CHANGE UP (ref 2–14)
NEUTROPHILS # BLD AUTO: 10.23 K/UL — HIGH (ref 1.8–7.4)
NEUTROPHILS NFR BLD AUTO: 86.1 % — HIGH (ref 43–77)
NITRITE UR-MCNC: NEGATIVE — SIGNIFICANT CHANGE UP
NT-PROBNP SERPL-SCNC: 294 PG/ML — SIGNIFICANT CHANGE UP (ref 0–300)
PH UR: 5 — SIGNIFICANT CHANGE UP (ref 5–8)
PLATELET # BLD AUTO: 259 K/UL — SIGNIFICANT CHANGE UP (ref 150–400)
POTASSIUM SERPL-MCNC: 4.6 MMOL/L — SIGNIFICANT CHANGE UP (ref 3.5–5.3)
POTASSIUM SERPL-SCNC: 4.6 MMOL/L — SIGNIFICANT CHANGE UP (ref 3.5–5.3)
PROT SERPL-MCNC: 6.6 G/DL — SIGNIFICANT CHANGE UP (ref 6.6–8.7)
PROT UR-MCNC: 30 MG/DL
PROTHROM AB SERPL-ACNC: 12.4 SEC — SIGNIFICANT CHANGE UP (ref 10–12.9)
RBC # BLD: 3.52 M/UL — LOW (ref 4.2–5.8)
RBC # FLD: 14.4 % — SIGNIFICANT CHANGE UP (ref 10.3–14.5)
RBC CASTS # UR COMP ASSIST: SIGNIFICANT CHANGE UP /HPF (ref 0–4)
SARS-COV-2 RNA SPEC QL NAA+PROBE: SIGNIFICANT CHANGE UP
SODIUM SERPL-SCNC: 136 MMOL/L — SIGNIFICANT CHANGE UP (ref 135–145)
SP GR SPEC: 1.01 — SIGNIFICANT CHANGE UP (ref 1.01–1.02)
TROPONIN T SERPL-MCNC: 0.11 NG/ML — HIGH (ref 0–0.06)
UROBILINOGEN FLD QL: 1 MG/DL
WBC # BLD: 11.89 K/UL — HIGH (ref 3.8–10.5)
WBC # FLD AUTO: 11.89 K/UL — HIGH (ref 3.8–10.5)
WBC UR QL: >50

## 2020-05-18 PROCEDURE — 99285 EMERGENCY DEPT VISIT HI MDM: CPT

## 2020-05-18 PROCEDURE — 71045 X-RAY EXAM CHEST 1 VIEW: CPT | Mod: 26

## 2020-05-18 PROCEDURE — 93010 ELECTROCARDIOGRAM REPORT: CPT

## 2020-05-18 PROCEDURE — 74181 MRI ABDOMEN W/O CONTRAST: CPT | Mod: 26

## 2020-05-18 PROCEDURE — 99223 1ST HOSP IP/OBS HIGH 75: CPT

## 2020-05-18 PROCEDURE — 74176 CT ABD & PELVIS W/O CONTRAST: CPT | Mod: 26

## 2020-05-18 PROCEDURE — 76700 US EXAM ABDOM COMPLETE: CPT | Mod: 26

## 2020-05-18 RX ORDER — SODIUM CHLORIDE 9 MG/ML
1000 INJECTION INTRAMUSCULAR; INTRAVENOUS; SUBCUTANEOUS ONCE
Refills: 0 | Status: COMPLETED | OUTPATIENT
Start: 2020-05-18 | End: 2020-05-18

## 2020-05-18 RX ORDER — SODIUM CHLORIDE 9 MG/ML
1000 INJECTION, SOLUTION INTRAVENOUS
Refills: 0 | Status: DISCONTINUED | OUTPATIENT
Start: 2020-05-18 | End: 2020-05-19

## 2020-05-18 RX ORDER — FINASTERIDE 5 MG/1
0 TABLET, FILM COATED ORAL
Qty: 90 | Refills: 0 | DISCHARGE

## 2020-05-18 RX ADMIN — SODIUM CHLORIDE 1000 MILLILITER(S): 9 INJECTION INTRAMUSCULAR; INTRAVENOUS; SUBCUTANEOUS at 13:22

## 2020-05-18 RX ADMIN — SODIUM CHLORIDE 1000 MILLILITER(S): 9 INJECTION INTRAMUSCULAR; INTRAVENOUS; SUBCUTANEOUS at 18:24

## 2020-05-18 RX ADMIN — SODIUM CHLORIDE 125 MILLILITER(S): 9 INJECTION, SOLUTION INTRAVENOUS at 18:29

## 2020-05-18 NOTE — ED PROVIDER NOTE - OBJECTIVE STATEMENT
89 y/o M with Hx of BPH, HLD, HTN, b/l pedal edema , CKD ( base Cr 1.4 )  OA . S/P  L BORIS in december; p/w painless jaundice; patient states he's been feeling generally unwell for several weeks, fatigued and malaise, but nothing focal; no fever, no abd pain, no n/v/d; no change to urine or bowel habits; went to see PMD today in office, who noted patient's jaundice, which patient states he never noticed; was found to be hypotensive in their office and referred here to ED; again, without focal complaints other than feeling weak and fatigued    Denies any history of excessive alcohol use or abuse

## 2020-05-18 NOTE — H&P ADULT - NSHPPHYSICALEXAM_GEN_ALL_CORE
Vital Signs Last 24 Hrs  T(C): 36.5 (18 May 2020 23:36), Max: 36.7 (18 May 2020 12:27)  T(F): 97.7 (18 May 2020 23:36), Max: 98 (18 May 2020 12:27)  HR: 63 (18 May 2020 23:36) (53 - 69)  BP: 118/59 (18 May 2020 23:36) (83/36 - 122/-)  BP(mean): 78 (18 May 2020 23:36) (59 - 78)  RR: 22 (18 May 2020 23:36) (18 - 22)  SpO2: 96% (18 May 2020 23:36) (94% - 97%)    General: NAD, resting comfortably in bed  Head: normocephalic, atraumatic  Eyes: PERRLA 3mm b/l, EOMI, +scleral icterus  Throat: non-erythematous, MMM  Neck: supple, no cervical lymphadenopathy, thyroid normal  Pulm: unlabored breathing, CTA bilaterally, no crackles or wheezes  Cardio: S1S2+, RRR, no murmurs  GI: soft, BS+ normoactive, non-distended, non-tender, no guarding or rigidity, no inguinal lymphadenopathy. no cva tenderness  Vascular: no pitting edema, DP pulses 2+ b/l, no calf tenderness, cap refill <3 secs  MSK: FROM in all extremities  Neuro: AAOx3, no gross focal deficits  Skin: warm and dry, visibly jaundiced (head, trunk, extremities) Vital Signs Last 24 Hrs  T(C): 36.5 (18 May 2020 23:36), Max: 36.7 (18 May 2020 12:27)  T(F): 97.7 (18 May 2020 23:36), Max: 98 (18 May 2020 12:27)  HR: 63 (18 May 2020 23:36) (53 - 69)  BP: 118/59 (18 May 2020 23:36) (83/36 - 122/-)  BP(mean): 78 (18 May 2020 23:36) (59 - 78)  RR: 22 (18 May 2020 23:36) (18 - 22)  SpO2: 96% (18 May 2020 23:36) (94% - 97%)    General: NAD, resting comfortably in bed  Head: normocephalic, atraumatic  Eyes: PERRLA 3mm b/l, EOMI, +scleral icterus  Throat: non-erythematous, MMM  Neck: supple, no cervical lymphadenopathy, thyroid normal  Pulm: unlabored breathing, CTA bilaterally, no crackles or wheezes  Cardio: S1S2+, RRR, no murmurs  GI: soft, BS+ normoactive, non-distended, non-tender, no guarding or rigidity, no palpable inguinal lymphadenopathy. no cva tenderness  Vascular: no pitting edema, DP pulses 2+ b/l, no calf tenderness, cap refill <3 secs  MSK: FROM in all extremities  Neuro: AAOx3, no gross focal deficits  Skin: warm and dry, visibly jaundiced (head, trunk, extremities)

## 2020-05-18 NOTE — ED PROVIDER NOTE - PMH
BPH (benign prostatic hyperplasia)    HLD (hyperlipidemia)    [place] (hard of hearing)    HTN (hypertension)    Melanoma    OA (osteoarthritis)

## 2020-05-18 NOTE — ED ADULT NURSE NOTE - PMH
BPH (benign prostatic hyperplasia)    HLD (hyperlipidemia)    [place] (hard of hearing)    HTN (hypertension)    Melanoma    OA (osteoarthritis)

## 2020-05-18 NOTE — CONSULT NOTE ADULT - SUBJECTIVE AND OBJECTIVE BOX
HPI:88 yo WM with HTN, HLD, CKD, OA, Left hip replacement in 12/2019.  Distant scalp melanoma resection.  sent from PCP's office for new jaundice3 and low BP.  Hasn't been feeling well x several weeks.  Some anorexia and nausea.  No vomiting or bleeding.  Alleges 50 # weight loss over past 18 months.  Has chronic low back pain but denies abdominal pain or upper back pain.   No GI bleeding.  no hx of PUD.  Distant colonoscopy 10 yrs ago was neg.  No hx of biliary colic. Had not noticed the new jaundice.  Admits to light stool and dark colored urine when prompted.    No hx of prior pancreatitis.        PAST MEDICAL & SURGICAL HISTORY:  BPH (benign prostatic hyperplasia)  HLD (hyperlipidemia)  HTN (hypertension)  [place] (hard of hearing)  OA (osteoarthritis)  Melanoma  H/O hernia repair  H/O melanoma excision: scalp  S/P cataract surgery      ROS:  No Heartburn, regurgitation, dysphagia, odynophagia.  No dyspepsia  No abdominal pain.    No Nausea, vomiting.  No Bleeding.  No hematemesis.   No diarrhea.    No hematochezia.  No weight loss, anorexia.  No edema.      MEDICATIONS  (STANDING):  multiple electrolytes Injection Type 1 1000 milliLiter(s) (125 mL/Hr) IV Continuous <Continuous>    MEDICATIONS  (PTA): Losartan 50; Zetia 10. Alfluzocin, KCL, Finasteride 5.  Torsemide 20, Asa 81.        Allergies  No Known Allergies    SOCIAL HISTORY:  NC    FAMILY HISTORY:  NC      Vital Signs Last 24 Hrs  T(C): 36.7 (18 May 2020 16:42), Max: 36.7 (18 May 2020 12:27)  T(F): 98 (18 May 2020 16:42), Max: 98 (18 May 2020 12:27)  HR: 59 (18 May 2020 16:42) (53 - 61)  BP: 122/- (18 May 2020 16:42) (83/36 - 122/-)  BP(mean): 59 (18 May 2020 16:42) (59 - 59)  RR: 18 (18 May 2020 16:42) (18 - 18)  SpO2: 95% (18 May 2020 16:42) (95% - 97%)    PHYSICAL EXAM:    GENERAL: NAD, well-groomed, well-developed  HEAD:  Atraumatic, Normocephalic  EYES: EOMI, PERRLA, conjunctiva and sclera clear  ENMT: No tonsillar erythema, exudates, or enlargement; Moist mucous membranes, Good dentition, No lesions  NECK: Supple, No JVD, Normal thyroid  CHEST/LUNG: Clear to percussion bilaterally; No rales, rhonchi, wheezing, or rubs  HEART: Regular rate and rhythm; No murmurs, rubs, or gallops  ABDOMEN: Soft, Nontender, Nondistended; Bowel sounds present  EXTREMITIES:  2+ Peripheral Pulses, No clubbing, cyanosis, or edema  LYMPH: No lymphadenopathy noted  SKIN: No rashes or lesions      LABS:                        10.3   11.89 )-----------( 259      ( 18 May 2020 13:18 )             32.3     05-18    136  |  98  |  54.0<H>  ----------------------------<  124<H>  4.6   |  22.0  |  1.78<H>    Ca    9.5      18 May 2020 13:18  Mg     2.8     05-18    TPro  6.6  /  Alb  3.1<L>  /  TBili  9.5<H>  /  DBili  8.6<H>  /  AST  64<H>  /  ALT  116<H>  /  AlkPhos  523<H>  05-18    PT/INR - ( 18 May 2020 13:18 )   PT: 12.4 sec;   INR: 1.09 ratio         PTT - ( 18 May 2020 13:18 )  PTT:31.1 sec  Lipase > 3000.      LIVER FUNCTIONS - ( 18 May 2020 13:18 )  Alb: 3.1 g/dL / Pro: 6.6 g/dL / ALK PHOS: 523 U/L / ALT: 116 U/L / AST: 64 U/L / GGT: x           EKG:  SB, LVH, LAD.      RADIOLOGY & ADDITIONAL STUDIES:  CXR:  NAPD

## 2020-05-18 NOTE — CONSULT NOTE ADULT - ASSESSMENT
Obstructive jaundice in elderly male with no hx of gallstone disease or pancreatitis.  Clinically not behaving as biliary pancreatitis.    High Lipase could be from an obstructing tumor.  Possible all gallstone disease.  High alk phos favors malignancy.    Needs further imaging.  CT scan already requested.  Sono to exclude Gallstones.  MRI c contrast and MRCP requested.  CEA, Ca 19.9, Ca 125.    Serial BW.  Hydration generous considering age  and renal insufficiency.   Already infused with 1 liter of saline and 125 cc/ hr now infusing.  Will follow.

## 2020-05-18 NOTE — ED ADULT NURSE NOTE - PAIN RATING/NUMBER SCALE (0-10): ACTIVITY
PDMP reviewed; no inappropriate and/or duplicate providers present.  No evidence of aberrant behavior identified.    Refill appropriate at this time.  Script signed.           0

## 2020-05-18 NOTE — H&P ADULT - ASSESSMENT
88 yo male with pmh of BPH, HTN, HLD, CKD3a, OA s/p L BORIS, distant scalp melanoma resection, was sent from pmd's office for further evaluation of painless jaundice. Noted significantly elevated lipase >3000, bili 9.5, . ctap with biliary duct dilatation due to mass effect from retroperitoneal lymphadenopathy. Findings compatible with either metastatic disease or lymphoma. No definite pancreatic mass identified. Admitted for further eval.    Obstructive jaundice  -with concern for malignancy   -ctap with biliary duct dilatation, unclear if from occult pancreatic head mass or mass effect from adjacent retroperitoneal LN  -MRCP done without contrast due to low gfr, and c/w ctap findings  -numerous gall stones on sonogram, however no clinical signs of biliary colic  -significantly elevated lipase, but no clinical signs of pancreatitis. c/w iv hydration for now  -recommend IR consult in the am for possible LN biopsy. will keep npo at midnight for possible intervention  -f/u CEA, CA-19, CA-125  -GI following    Transaminitis  -no focal liver lesion on imaging  -f/u acute hep panel    UTI  -grossly positive UA and +urinary freq  -empiric tx with Rocephin  -f/u urine cx and blood cxs    ABIGAIL on CKD3a  -Cr 1.78 on arrival, worse from baseline ~1.4  -likely from dehydration due to poor oral intake  -iv hydration overnight. may consider discontinuing in the morning if improved Cr  -avoid nephrotoxic meds    Elevated troponin  -no active anginal symptoms, ekg without acute ischemic changes  -trend cardiac enzymes  -likely secondary to renal failure 88 yo male with pmh of BPH, HTN, HLD, CKD3a, OA s/p L BORIS, distant scalp melanoma resection, was sent from pmd's office for further evaluation of painless jaundice. Noted significantly elevated lipase >3000, bili 9.5, . ctap with biliary duct dilatation due to mass effect from retroperitoneal lymphadenopathy. Findings compatible with either metastatic disease or lymphoma. No definite pancreatic mass identified. Admitted for further eval.    Obstructive jaundice  -with concern for malignancy   -ctap with biliary duct dilatation, unclear if from occult pancreatic head mass or mass effect from adjacent retroperitoneal LN  -MRCP done without contrast due to low gfr, and c/w ctap findings  -numerous gall stones on sonogram, however no clinical signs of biliary colic  -significantly elevated lipase, but no clinical signs of pancreatitis. c/w iv hydration for now  -recommend IR consult in the am for possible LN biopsy. will keep npo at midnight for possible intervention  -f/u CEA, CA-19, CA-125  -GI following    Transaminitis  -no focal liver lesion on imaging  -f/u acute hep panel    UTI  -grossly positive UA and +urinary freq  -empiric tx with Rocephin  -f/u urine cx and blood cxs  -iv hydration    ABIGAIL on CKD3a  -Cr 1.78 on arrival, worse from baseline ~1.4  -likely from dehydration due to poor oral intake  -iv hydration overnight. may consider discontinuing in the morning if improved Cr  -avoid nephrotoxic meds    Elevated troponin  -no active anginal symptoms, ekg without acute ischemic changes  -trend cardiac enzymes  -likely secondary to renal failure    Normocytic anemia  -Hb at baseline ~10  -no active bleeding  -follow up o/p with pmd for anemia work up    HTN  -hypotensive on arrival, improved s/p ivf  -will hold home dose losartan for now. consider resuming in am if stable BP    HLD  -pt take ezetimibe at home. unavailable at hospital. may resume on discharge    BPH  -chronic, stable   -c/w proscar and flomax    DVT ppx: scds. holding chemical AC for possible IR-guided intervention tomorrow    Dispo: unclear LOS. pending further eval for source of possible malignancy 89 yo male with pmh of BPH, HTN, HLD, CKD3a, OA s/p L BORIS, distant scalp melanoma resection, was sent from pmd's office for further evaluation of painless jaundice. Noted significantly elevated lipase >3000, bili 9.5, . ctap with biliary duct dilatation due to mass effect from retroperitoneal lymphadenopathy. Findings compatible with either metastatic disease or lymphoma. No definite pancreatic mass identified. Admitted for further eval.    Obstructive jaundice  -with concern for malignancy   -ctap with biliary duct dilatation, unclear if from occult pancreatic head mass or mass effect from adjacent retroperitoneal LN  -MRCP done without contrast due to low gfr, and c/w ctap findings  -numerous gall stones on sonogram, however no clinical signs of biliary colic  -significantly elevated lipase, but no clinical signs of pancreatitis. c/w iv hydration for now  -recommend IR consult in the am for possible LN biopsy. will keep npo at midnight for possible intervention  -f/u CEA, CA-19, CA-125  -GI following, f/u recommendations for relief of obstruction (ercp vs ir)        UTI  -grossly positive UA and +urinary freq  -empiric tx with Rocephin  -f/u urine cx and blood cxs  -iv hydration    ABIGAIL on CKD3a  -Cr 1.78 on arrival, worse from baseline ~1.4  -likely from dehydration due to poor oral intake  -iv hydration overnight. may consider discontinuing in the morning if improved Cr  -avoid nephrotoxic meds    Elevated troponin  -no active anginal symptoms, ekg without acute ischemic changes  -trend cardiac enzymes  -likely secondary to renal failure    Normocytic anemia  -Hb at baseline ~10  -no active bleeding  -follow up o/p with pmd for anemia work up    HTN  -hypotensive on arrival, improved s/p ivf  -will hold home dose losartan for now. consider resuming in am if stable BP    HLD  -pt take ezetimibe at home. unavailable at hospital. may resume on discharge    BPH  -chronic, stable   -c/w proscar and flomax    DVT ppx: scds. holding chemical AC for possible IR-guided intervention tomorrow, please start if no plans for intervention    Dispo: unclear LOS. pending further eval for source of possible malignancy

## 2020-05-18 NOTE — ED ADULT NURSE NOTE - OBJECTIVE STATEMENT
Patient presents to ER complaining of shortness of breath with onset 2 weeks ago and worsening, patient reports left hip surgery in December, jaundice noted, patient hypotensive in triage, resp even/unlabored, MD called to evaluate patient. Patient presents to ER complaining of shortness of breath, dizziness and lightheadedness with onset 2 weeks ago and worsening, patient was advised this morning by his PMD to come to Er due to low blood pressure, patient reports left hip surgery in December, jaundice noted, patient hypotensive in triage, resp even/unlabored, MD called to evaluate patient.

## 2020-05-18 NOTE — H&P ADULT - HISTORY OF PRESENT ILLNESS
88 yo male with pmh of BPH, HTN, HLD, CKD3a, OA s/p L BORIS, distant scalp melanoma resection, was sent from pmd's office for further evaluation of painless jaundice. Reports low appetite and unintentional weight loss approx 20 lbs in last 2 months. No family hx of cancer. No hx of EtOH abuse. No prior incidents of jaundice. No hx of pancreatitis or biliary colic. Distant colonoscopy about 10 yrs ago was negative. Also reports urinary frequency for the past 2 weeks. Denies dysuria or urgency. No flank pain, fevers, chills, n/v, abd pain, or bleeding from anywhere.     ED course: Noted to be hypotensive on arrival with BP 83/36, improved to 113/53 s/p 1L NS. Noted significantly elevated lipase >3000, bili 9.5, . ctap with biliary duct dilatation due to mass effect from retroperitoneal lymphadenopathy. Findings compatible with either metastatic disease or lymphoma. Limited eval of pancreas. No definite pancreatic mass identified. MRCP (no contrast due to low GFR) for further eval, and prelim report consistent with ct scan findings.

## 2020-05-18 NOTE — H&P ADULT - NSHPSOCIALHISTORY_GEN_ALL_CORE
Former smoker. 1ppd x 15yrs. quit in 1963.   Occasional EtOH use. drinks about 1 case of beer per year.   Denies illicit drug use.  Lives in private home with wife.

## 2020-05-18 NOTE — H&P ADULT - NSICDXPASTMEDICALHX_GEN_ALL_CORE_FT
PAST MEDICAL HISTORY:  BPH (benign prostatic hyperplasia)     HLD (hyperlipidemia)     [place] (hard of hearing)     HTN (hypertension)     Melanoma     OA (osteoarthritis)

## 2020-05-19 LAB
ALBUMIN SERPL ELPH-MCNC: 3 G/DL — LOW (ref 3.3–5.2)
ALP SERPL-CCNC: 482 U/L — HIGH (ref 40–120)
ALT FLD-CCNC: 99 U/L — HIGH
AMYLASE P1 CFR SERPL: 866 U/L — HIGH (ref 36–128)
ANION GAP SERPL CALC-SCNC: 16 MMOL/L — SIGNIFICANT CHANGE UP (ref 5–17)
AST SERPL-CCNC: 61 U/L — HIGH
BILIRUB SERPL-MCNC: 9 MG/DL — HIGH (ref 0.4–2)
BLD GP AB SCN SERPL QL: SIGNIFICANT CHANGE UP
BUN SERPL-MCNC: 51 MG/DL — HIGH (ref 8–20)
CALCIUM SERPL-MCNC: 8.9 MG/DL — SIGNIFICANT CHANGE UP (ref 8.6–10.2)
CANCER AG125 SERPL-ACNC: 36 U/ML — SIGNIFICANT CHANGE UP
CANCER AG19-9 SERPL-ACNC: 253 U/ML — HIGH
CEA SERPL-MCNC: 339 NG/ML — HIGH (ref 0–3.8)
CHLORIDE SERPL-SCNC: 105 MMOL/L — SIGNIFICANT CHANGE UP (ref 98–107)
CK SERPL-CCNC: 65 U/L — SIGNIFICANT CHANGE UP (ref 30–200)
CK SERPL-CCNC: 83 U/L — SIGNIFICANT CHANGE UP (ref 30–200)
CO2 SERPL-SCNC: 20 MMOL/L — LOW (ref 22–29)
CREAT SERPL-MCNC: 1.2 MG/DL — SIGNIFICANT CHANGE UP (ref 0.5–1.3)
CULTURE RESULTS: SIGNIFICANT CHANGE UP
GLUCOSE BLDC GLUCOMTR-MCNC: 111 MG/DL — HIGH (ref 70–99)
GLUCOSE BLDC GLUCOMTR-MCNC: 119 MG/DL — HIGH (ref 70–99)
GLUCOSE BLDC GLUCOMTR-MCNC: 130 MG/DL — HIGH (ref 70–99)
GLUCOSE BLDC GLUCOMTR-MCNC: 135 MG/DL — HIGH (ref 70–99)
GLUCOSE BLDC GLUCOMTR-MCNC: 160 MG/DL — HIGH (ref 70–99)
GLUCOSE SERPL-MCNC: 124 MG/DL — HIGH (ref 70–99)
HAV IGM SER-ACNC: ABNORMAL
HBV CORE IGM SER-ACNC: SIGNIFICANT CHANGE UP
HBV SURFACE AG SER-ACNC: SIGNIFICANT CHANGE UP
HCT VFR BLD CALC: 30.3 % — LOW (ref 39–50)
HCV AB S/CO SERPL IA: 0.23 S/CO — SIGNIFICANT CHANGE UP (ref 0–0.99)
HCV AB SERPL-IMP: SIGNIFICANT CHANGE UP
HGB BLD-MCNC: 10 G/DL — LOW (ref 13–17)
LIDOCAIN IGE QN: >3000 U/L — HIGH (ref 22–51)
MCHC RBC-ENTMCNC: 29.5 PG — SIGNIFICANT CHANGE UP (ref 27–34)
MCHC RBC-ENTMCNC: 33 GM/DL — SIGNIFICANT CHANGE UP (ref 32–36)
MCV RBC AUTO: 89.4 FL — SIGNIFICANT CHANGE UP (ref 80–100)
PLATELET # BLD AUTO: 257 K/UL — SIGNIFICANT CHANGE UP (ref 150–400)
POTASSIUM SERPL-MCNC: 4.4 MMOL/L — SIGNIFICANT CHANGE UP (ref 3.5–5.3)
POTASSIUM SERPL-SCNC: 4.4 MMOL/L — SIGNIFICANT CHANGE UP (ref 3.5–5.3)
PROT SERPL-MCNC: 6 G/DL — LOW (ref 6.6–8.7)
RBC # BLD: 3.39 M/UL — LOW (ref 4.2–5.8)
RBC # FLD: 14.5 % — SIGNIFICANT CHANGE UP (ref 10.3–14.5)
SODIUM SERPL-SCNC: 140 MMOL/L — SIGNIFICANT CHANGE UP (ref 135–145)
SPECIMEN SOURCE: SIGNIFICANT CHANGE UP
TROPONIN T SERPL-MCNC: 0.07 NG/ML — HIGH (ref 0–0.06)
TROPONIN T SERPL-MCNC: 0.09 NG/ML — HIGH (ref 0–0.06)
WBC # BLD: 10.36 K/UL — SIGNIFICANT CHANGE UP (ref 3.8–10.5)
WBC # FLD AUTO: 10.36 K/UL — SIGNIFICANT CHANGE UP (ref 3.8–10.5)

## 2020-05-19 PROCEDURE — 99222 1ST HOSP IP/OBS MODERATE 55: CPT

## 2020-05-19 PROCEDURE — 99233 SBSQ HOSP IP/OBS HIGH 50: CPT

## 2020-05-19 PROCEDURE — 93306 TTE W/DOPPLER COMPLETE: CPT | Mod: 26

## 2020-05-19 RX ORDER — TAMSULOSIN HYDROCHLORIDE 0.4 MG/1
0.4 CAPSULE ORAL AT BEDTIME
Refills: 0 | Status: DISCONTINUED | OUTPATIENT
Start: 2020-05-19 | End: 2020-05-26

## 2020-05-19 RX ORDER — ASPIRIN/CALCIUM CARB/MAGNESIUM 324 MG
1 TABLET ORAL
Qty: 0 | Refills: 0 | DISCHARGE

## 2020-05-19 RX ORDER — EZETIMIBE 10 MG/1
1 TABLET ORAL
Qty: 0 | Refills: 0 | DISCHARGE

## 2020-05-19 RX ORDER — SACCHAROMYCES BOULARDII 250 MG
250 POWDER IN PACKET (EA) ORAL
Refills: 0 | Status: DISCONTINUED | OUTPATIENT
Start: 2020-05-19 | End: 2020-05-26

## 2020-05-19 RX ORDER — GLUCAGON INJECTION, SOLUTION 0.5 MG/.1ML
1 INJECTION, SOLUTION SUBCUTANEOUS ONCE
Refills: 0 | Status: DISCONTINUED | OUTPATIENT
Start: 2020-05-19 | End: 2020-05-26

## 2020-05-19 RX ORDER — INSULIN LISPRO 100/ML
VIAL (ML) SUBCUTANEOUS
Refills: 0 | Status: DISCONTINUED | OUTPATIENT
Start: 2020-05-19 | End: 2020-05-25

## 2020-05-19 RX ORDER — CEFTRIAXONE 500 MG/1
1000 INJECTION, POWDER, FOR SOLUTION INTRAMUSCULAR; INTRAVENOUS EVERY 24 HOURS
Refills: 0 | Status: COMPLETED | OUTPATIENT
Start: 2020-05-19 | End: 2020-05-24

## 2020-05-19 RX ORDER — ACETAMINOPHEN 500 MG
650 TABLET ORAL EVERY 6 HOURS
Refills: 0 | Status: DISCONTINUED | OUTPATIENT
Start: 2020-05-19 | End: 2020-05-26

## 2020-05-19 RX ORDER — INSULIN LISPRO 100/ML
VIAL (ML) SUBCUTANEOUS AT BEDTIME
Refills: 0 | Status: DISCONTINUED | OUTPATIENT
Start: 2020-05-19 | End: 2020-05-25

## 2020-05-19 RX ORDER — SODIUM CHLORIDE 9 MG/ML
1000 INJECTION, SOLUTION INTRAVENOUS
Refills: 0 | Status: DISCONTINUED | OUTPATIENT
Start: 2020-05-19 | End: 2020-05-26

## 2020-05-19 RX ORDER — DEXTROSE 50 % IN WATER 50 %
12.5 SYRINGE (ML) INTRAVENOUS ONCE
Refills: 0 | Status: DISCONTINUED | OUTPATIENT
Start: 2020-05-19 | End: 2020-05-25

## 2020-05-19 RX ORDER — DEXTROSE 50 % IN WATER 50 %
25 SYRINGE (ML) INTRAVENOUS ONCE
Refills: 0 | Status: DISCONTINUED | OUTPATIENT
Start: 2020-05-19 | End: 2020-05-25

## 2020-05-19 RX ORDER — ASPIRIN/CALCIUM CARB/MAGNESIUM 324 MG
81 TABLET ORAL DAILY
Refills: 0 | Status: DISCONTINUED | OUTPATIENT
Start: 2020-05-19 | End: 2020-05-20

## 2020-05-19 RX ORDER — DEXTROSE 50 % IN WATER 50 %
15 SYRINGE (ML) INTRAVENOUS ONCE
Refills: 0 | Status: DISCONTINUED | OUTPATIENT
Start: 2020-05-19 | End: 2020-05-25

## 2020-05-19 RX ORDER — URSODIOL 250 MG/1
250 TABLET, FILM COATED ORAL EVERY 12 HOURS
Refills: 0 | Status: DISCONTINUED | OUTPATIENT
Start: 2020-05-19 | End: 2020-05-19

## 2020-05-19 RX ORDER — FINASTERIDE 5 MG/1
5 TABLET, FILM COATED ORAL DAILY
Refills: 0 | Status: DISCONTINUED | OUTPATIENT
Start: 2020-05-19 | End: 2020-05-26

## 2020-05-19 RX ORDER — URSODIOL 250 MG/1
250 TABLET, FILM COATED ORAL
Refills: 0 | Status: COMPLETED | OUTPATIENT
Start: 2020-05-19 | End: 2020-05-20

## 2020-05-19 RX ORDER — HYDRALAZINE HCL 50 MG
5 TABLET ORAL EVERY 6 HOURS
Refills: 0 | Status: COMPLETED | OUTPATIENT
Start: 2020-05-19 | End: 2020-05-21

## 2020-05-19 RX ADMIN — Medication 250 MILLIGRAM(S): at 16:53

## 2020-05-19 RX ADMIN — Medication 250 MILLIGRAM(S): at 05:33

## 2020-05-19 RX ADMIN — TAMSULOSIN HYDROCHLORIDE 0.4 MILLIGRAM(S): 0.4 CAPSULE ORAL at 21:41

## 2020-05-19 RX ADMIN — FINASTERIDE 5 MILLIGRAM(S): 5 TABLET, FILM COATED ORAL at 11:04

## 2020-05-19 RX ADMIN — Medication 81 MILLIGRAM(S): at 11:04

## 2020-05-19 RX ADMIN — SODIUM CHLORIDE 125 MILLILITER(S): 9 INJECTION, SOLUTION INTRAVENOUS at 11:04

## 2020-05-19 RX ADMIN — CEFTRIAXONE 100 MILLIGRAM(S): 500 INJECTION, POWDER, FOR SOLUTION INTRAMUSCULAR; INTRAVENOUS at 23:31

## 2020-05-19 RX ADMIN — URSODIOL 250 MILLIGRAM(S): 250 TABLET, FILM COATED ORAL at 16:53

## 2020-05-19 RX ADMIN — CEFTRIAXONE 100 MILLIGRAM(S): 500 INJECTION, POWDER, FOR SOLUTION INTRAMUSCULAR; INTRAVENOUS at 01:43

## 2020-05-19 RX ADMIN — Medication 5 MILLIGRAM(S): at 16:53

## 2020-05-19 NOTE — CONSULT NOTE ADULT - SUBJECTIVE AND OBJECTIVE BOX
[place] [facility]                                                               Office:  [address] [address]                                                              Telephone: [phone]. Fax:[phone]                                                                        CARDIOLOGY CONSULTATION NOTE                                                                                             Consult requested by:  Dr. Benitez  Reason for Consultation: Cardiac Risk Stratification  History obtained by: Patient and medical record   obtained: No    Chief complaint:    Patient is a 89y old  Male who presents with a chief complaint of obstructive jaundice with concern for malignancy (19 May 2020 08:37)        HPI: Pt is a 88 y/o male with medical history of BPH, HTN, HLD, CKD, OA, s/p LHA (6 months ago), scalp melanoma resection, who presents to [facility] for jaundice. Pt states for past two weeks pt c/o of lightheadedness/dizziness, and loss of appetite worsening within the last 2 days. Pt went to PCP who assessed pt and noticed pt was jaundiced and sent pt to [facility]-ED. US Abd revealed intrahepatic and CBD dilation, numerous gallstones, CT Abd/Pelvis revealed moderate biliary ductal dilation d/t mass effect from periportal lymphadenopathy compatible with metastatic dx or lymphoma. GI team recommended MRCP showed moderate biliary ductal dilatino with narrowing of common bilae duct and pancreatic duct in the pancreatitc head. Findings could be d/t occult pancreatic head mass vs. obstruction from bulky periportal and peripancreatic lymphadenopathy. Radiology findings coincided with lab results - AST/ALT- 61/99, Lipase- >3000, Amylase- 866. Plan for pt to undergo ERCP procedure as per GI team recommendations. Pt Denies fever, chills, cough, phlegm production, shortness of breath, dyspnea on exertion, orthopnea, PND, edema, chest pain, pressure, palpitations, irregular, fast heart beat, nausea, vomiting, melena, rectal bleed, hematuria, lightheadedness, dizziness, syncope, near syncope. Functional status- 4mets.            REVIEW OF SYMPTOMS:     CONSTITUTIONAL: No fever, weight loss, or fatigue  ENMT:  No difficulty hearing, tinnitus, vertigo; No sinus or throat pain  NECK: No pain or stiffness  CARDIOVASCULAR: See HPI  RESPIRATORY: See HPI  : No dysuria, no hematuria   GI: No dark color stool, no melena, no diarrhea, no constipation, no abdominal pain   NEURO: No headache, no dizziness, no slurred speech   MUSCULOSKELETAL: No joint pain or swelling; No muscle, back, or extremity pain  PSYCH: No agitation, no anxiety.    ALL OTHER REVIEW OF SYSTEMS ARE NEGATIVE.      PREVIOUS DIAGNOSTIC TESTING  ECHO FINDINGS: negative as per pt, completed 6-7 months ago prior to ortho sx      STRESS FINDINGS: negative as per pt, completed 6-7 months ago prior to ortho sx      ALLERGIES: Allergies    No Known Allergies    Intolerances          PAST MEDICAL HISTORY  BPH (benign prostatic hyperplasia)  HLD (hyperlipidemia)  HTN (hypertension)  [place] (hard of hearing)  OA (osteoarthritis)  Melanoma      PAST SURGICAL HISTORY  H/O hernia repair  H/O melanoma excision  S/P cataract surgery      FAMILY HISTORY:  No pertinent family history in first degree relatives      SOCIAL HISTORY:    CIGARETTES:  Quit 6 years ago, 1ppd for 15-20 years  ALCOHOL: Occasional  DRUGS: Denies       CURRENT MEDICATIONS:  tamsulosin 0.4 milliGRAM(s) Oral at bedtime    aspirin enteric coated  cefTRIAXone   IVPB  dextrose 5%.  dextrose 50% Injectable  dextrose 50% Injectable  dextrose 50% Injectable  finasteride  insulin lispro (HumaLOG) corrective regimen sliding scale  insulin lispro (HumaLOG) corrective regimen sliding scale  multiple electrolytes Injection Type 1  saccharomyces boulardii        HOME MEDICATIONS:      Vital Signs Last 24 Hrs  T(C): 36.8 (19 May 2020 07:39), Max: 36.8 (19 May 2020 07:39)  T(F): 98.3 (19 May 2020 07:39), Max: 98.3 (19 May 2020 07:39)  HR: 55 (19 May 2020 07:39) (53 - 69)  BP: 110/58 (19 May 2020 07:39) (93/52 - 122/-)  BP(mean): 66 (19 May 2020 04:42) (59 - 78)  RR: 20 (19 May 2020 07:39) (18 - 22)  SpO2: 96% (19 May 2020 07:39) (94% - 97%)      PHYSICAL EXAM:  Constitutional: Comfortable . No acute distress.   HEENT: Atraumatic and normocephalic , neck is supple . no JVD. No carotid bruit. PEERL   CNS: A&Ox3. No focal deficits. EOMI. Cranial nerves II-IX are intact.   Lymph Nodes: Cervical : Not palpable.  Respiratory: CTAB  Cardiovascular: S1S2 RRR. No murmur/rubs or gallop.  Gastrointestinal: Soft non-tender and non distended . +Bowel sounds. negative Riddle's sign.  Extremities: No edema.   Psychiatric: Calm . no agitation.  Skin: No skin rash/ulcers visualized to face, hands or feet.    Intake and output:     LABS:                        10.0   10.36 )-----------( 257      ( 19 May 2020 08:29 )             30.3     05    140  |  105  |  51.0<H>  ----------------------------<  124<H>  4.4   |  20.0<L>  |  1.20    Ca    8.9      19 May 2020 08:29  Mg     2.8         TPro  6.0<L>  /  Alb  3.0<L>  /  TBili  9.0<H>  /  DBili  x   /  AST  61<H>  /  ALT  99<H>  /  AlkPhos  482<H>      CARDIAC MARKERS ( 19 May 2020 08:29 )  x     / 0.07 ng/mL / 83 U/L / x     / x      CARDIAC MARKERS ( 19 May 2020 00:54 )  x     / 0.09 ng/mL / 65 U/L / x     / x      CARDIAC MARKERS ( 18 May 2020 13:18 )  x     / 0.11 ng/mL / x     / x     / x        ;p-BNP=Serum Pro-Brain Natriuretic Peptide: 294 pg/mL ( @ 13:18)    PT/INR - ( 18 May 2020 13:18 )   PT: 12.4 sec;   INR: 1.09 ratio         PTT - ( 18 May 2020 13:18 )  PTT:31.1 sec  Urinalysis Basic - ( 18 May 2020 18:49 )    Color: Yellow / Appearance: Slightly Turbid / S.015 / pH: x  Gluc: x / Ketone: Negative  / Bili: Moderate / Urobili: 1 mg/dL   Blood: x / Protein: 30 mg/dL / Nitrite: Negative   Leuk Esterase: Moderate / RBC: 0-2 /HPF / WBC >50   Sq Epi: x / Non Sq Epi: Moderate / Bacteria: Moderate        INTERPRETATION OF TELEMETRY: Reviewed by me.   ECG: Reviewed by me.     RADIOLOGY & ADDITIONAL STUDIES:    X-ray:  < from: Xray Chest 1 View-PORTABLE IMMEDIATE (20 @ 14:08) >  FINDINGS/  IMPRESSION:          The lungs are clear.  No pleural abnormality is seen.      Cardiomediastinal silhouette is intact.    < end of copied text >    US: < from: US Abdomen Complete (20 @ 19:17) >  IMPRESSION:     Intrahepatic and CBD dilatation. Numerous gallstones. Pancreas is not visualizedsecondary to overlying bowel gas. Further evaluation with CT abdomen/pelvis is recommended.    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    Several cysts in the kidneys as above.    < end of copied text >      CT scan: < from: CT Abdomen and Pelvis No Cont (20 @ 19:56) >  IMPRESSION:     Moderate biliary ductal dilatation due tomass effect from periportal lymphadenopathy.    Additional bulky retroperitoneal lymphadenopathy. Findings are compatible with either metastatic disease or lymphoma.      Limited evaluation of the pancreas. No definite pancreatic mass identified. Suggest MRI for better evaluation.    Bibasilar dependent airspace opacities could represent atelectasis or pneumonia.    < end of copied text >      MRI: < from: MR MRCP No Cont (20 @ 22:38) >  IMPRESSION:   Moderate biliary ductal dilatation with narrowing of thecommon bile duct and pancreatic duct in the pancreatic head. No discrete pancreatic head mass is identified.    Findings could be due to occult pancreatic head mass versus obstruction from bulky periportal and peripancreatic lymphadenopathy.      < end of copied text >

## 2020-05-19 NOTE — PROGRESS NOTE ADULT - ASSESSMENT
89 yo male with pmh of BPH, HTN, HLD, CKD3a, OA s/p L BORIS, distant scalp melanoma resection, was sent from pmd's office for further evaluation of painless jaundice. Noted significantly elevated lipase >3000, bili 9.5, . ctap with biliary duct dilatation due to mass effect from retroperitoneal lymphadenopathy. Findings compatible with either metastatic disease or lymphoma. No definite pancreatic mass identified. Admitted for further eval.    Obstructive jaundice  -with concern for malignancy   -ctap with biliary duct dilatation, unclear if from occult pancreatic head mass or mass effect from adjacent retroperitoneal LN  -MRCP done without contrast due to low gfr, and c/w ctap findings  -numerous gall stones on sonogram, however no clinical signs of biliary colic  -significantly elevated lipase, but no clinical signs of pancreatitis. c/w iv hydration for now  -recommend IR consult in the am for possible LN biopsy.   - f/u CEA,   - elevated CA-19-9  -GI following, f/u recommendations for relief of obstruction (ercp vs ir)  - cardiac clearance prior to EUS/ERCP  - start clear liquid diet today    UTI  -grossly positive UA and +urinary freq  -empiric tx with Rocephin  -f/u urine cx and blood cxs  -iv hydration    ABIGAIL on CKD3a  -Cr 1.78 on arrival, worse from baseline ~1.4  -likely from dehydration due to poor oral intake  -iv hydration overnight. may consider discontinuing in the morning if improved Cr  -avoid nephrotoxic meds    Elevated troponin  -no active anginal symptoms, ekg without acute ischemic changes  -trend cardiac enzymes  -likely secondary to renal failure    Normocytic anemia  -Hb at baseline ~10  -no active bleeding  -follow up o/p with pmd for anemia work up    HTN  -hypotensive on arrival, improved s/p ivf  -will hold home dose losartan for now. consider resuming in am if stable BP    HLD  -pt take ezetimibe at home. unavailable at hospital. may resume on discharge    BPH  -chronic, stable   -c/w proscar and flomax    DVT ppx: scds. holding chemical AC for possible IR-guided intervention tomorrow, please start if no plans for intervention    Dispo: unclear LOS. pending further eval for source of possible malignancy 89 yo male with pmh of BPH, HTN, HLD, CKD3a, OA s/p L BORIS, distant scalp melanoma resection, was sent from pmd's office for further evaluation of painless jaundice. Noted significantly elevated lipase >3000, bili 9.5, . ctap with biliary duct dilatation due to mass effect from retroperitoneal lymphadenopathy. Findings compatible with either metastatic disease or lymphoma. No definite pancreatic mass identified. Admitted for further eval. MRCP complete, now pending eval with EUS/ERCP. Patient is medically optimized for planned procedure, cardiac clearance also noted.     Obstructive jaundice  -with concern for malignancy   -ctap with biliary duct dilatation, unclear if from occult pancreatic head mass or mass effect from adjacent retroperitoneal LN  -MRCP notable for moderate biliary ductal dilation of CBD and pancreatic duct in the pancreatic head. No discrete head mass identified.   - Findings on MRCP suspicious for occult pancreatic head mass vs. obstruction from bulky periportal and pancreatic lymphadenopathy.   -numerous gall stones on sonogram, however no clinical signs of biliary colic  -Lipase >3000, Bili 9.5, but no clinical signs of pancreatitis  - started on clear liquid diet  - tumor markers elevated   - f/u CEA, CA 19-9 elevated  - Gi consult note seen, recs appreciated. Patient to have further eval with EUS/ERCP tomorrow  - cardio note seen, -Pt is a low cardiac risk for low risk procedure, benefits outweigh risks  - patient is medically optimized for EUS/ERCP tomorrow  - start clear liquid diet today  - NPO after midnight    UTI  -grossly positive UA and +urinary freq  - c/w empiric tx with Rocephin  - f/u urine cx and blood cxs    ABIGAIL on CKD3a  - resolved, creatinine now 1.20  -Cr 1.78 on arrival, worse from baseline ~1.4  -likely from dehydration due to poor oral intake  - d/c IVF hydration   - f/u am labs  - avoid nephrotoxic meds    HTN  -hypotensive on arrival, improved s/p ivf but still soft  - hold home dose losartan since BPs low  - prn hydralazine for SBP>160    Elevated troponin  -no active anginal symptoms, ekg without acute ischemic changes  -trend cardiac enzymes  -likely secondary to renal failure    Normocytic anemia  -Hb at baseline ~10  -no active bleeding  -follow up o/p with pmd for anemia work up    HLD  -pt take ezetimibe at home. unavailable at hospital. may resume on discharge    BPH  -chronic, stable   -c/w proscar and flomax    DVT ppx: scds. holding chemical AC for possible IR-guided intervention tomorrow, please start if no plans for intervention    Dispo: unclear LOS. pending further eval for source of possible malignancy 88 yo male with pmh of BPH, HTN, HLD, CKD3a, OA s/p L BORIS, distant scalp melanoma resection, was sent from pmd's office for further evaluation of painless jaundice. Noted significantly elevated lipase >3000, bili 9.5, . ctap with biliary duct dilatation due to mass effect from retroperitoneal lymphadenopathy. Findings compatible with either metastatic disease or lymphoma. No definite pancreatic mass identified. Admitted for further eval. MRCP complete, now pending eval with EUS/ERCP. Patient is medically optimized for planned procedure, cardiac clearance also noted.     Obstructive jaundice 2/2 occult pancreatic mass/obstruction  -with concern for malignancy   -CT A/P with biliary duct dilatation, unclear if from occult pancreatic head mass or mass effect from adjacent retroperitoneal LN  -MRCP notable for moderate biliary ductal dilation of CBD and pancreatic duct in the pancreatic head. No discrete head mass identified.   - Findings on MRCP suspicious for occult pancreatic head mass vs. obstruction from bulky periportal and pancreatic lymphadenopathy.   -numerous gall stones on sonogram, however no clinical signs of biliary colic  -Lipase >3000, Bili 9.5, but no clinical signs of pancreatitis  - started on clear liquid diet  - ursodiol QD for pruritis  - tumor markers elevated   - CEA, CA 19-9 elevated  - GI consult note seen, recs appreciated. Patient to have further eval with EUS/ERCP tomorrow  - cardio note seen, -Pt is a low cardiac risk for low risk procedure, benefits outweigh risks  - patient is medically optimized for EUS/ERCP tomorrow  - start clear liquid diet today  - NPO after midnight    UTI  -grossly positive UA and +urinary freq  - c/w empiric tx with Rocephin  - f/u urine cx and blood cxs    ABIGAIL on CKD3a  - resolved, creatinine now 1.20  -Cr 1.78 on arrival, worse from baseline ~1.4  -likely from dehydration due to poor oral intake  - d/c IVF hydration   - f/u am labs  - avoid nephrotoxic meds    HTN  -hypotensive on arrival, improved s/p ivf but still soft  - hold home dose losartan since BPs low  - prn hydralazine for SBP>160    Elevated troponin  -no active anginal symptoms, ekg without acute ischemic changes  -trend cardiac enzymes  -likely secondary to renal failure    Normocytic anemia  -Hb at baseline ~10  -no active bleeding  -follow up o/p with pmd for anemia work up    HLD  -pt take ezetimibe at home. unavailable at hospital. may resume on discharge    BPH  -chronic, stable   -c/w proscar and flomax    DVT ppx: scds. holding chemical AC for possible IR-guided intervention tomorrow, please start if no plans for intervention    Dispo: unclear LOS. pending further eval for source of possible malignancy

## 2020-05-19 NOTE — CONSULT NOTE ADULT - SUBJECTIVE AND OBJECTIVE BOX
INTERVAL HPI/OVERNIGHT EVENTS:Patient with c/o itching. Jaundice. No other complaints. CT abdomen/mrcp performed.     MEDICATIONS  (STANDING):  aspirin enteric coated 81 milliGRAM(s) Oral daily  cefTRIAXone   IVPB 1000 milliGRAM(s) IV Intermittent every 24 hours  dextrose 5%. 1000 milliLiter(s) (50 mL/Hr) IV Continuous <Continuous>  dextrose 50% Injectable 12.5 Gram(s) IV Push once  dextrose 50% Injectable 25 Gram(s) IV Push once  dextrose 50% Injectable 25 Gram(s) IV Push once  finasteride 5 milliGRAM(s) Oral daily  insulin lispro (HumaLOG) corrective regimen sliding scale   SubCutaneous three times a day before meals  insulin lispro (HumaLOG) corrective regimen sliding scale   SubCutaneous at bedtime  multiple electrolytes Injection Type 1 1000 milliLiter(s) (125 mL/Hr) IV Continuous <Continuous>  saccharomyces boulardii 250 milliGRAM(s) Oral two times a day  tamsulosin 0.4 milliGRAM(s) Oral at bedtime    MEDICATIONS  (PRN):  acetaminophen   Tablet .. 650 milliGRAM(s) Oral every 6 hours PRN Temp greater or equal to 38C (100.4F), Mild Pain (1 - 3)  dextrose 40% Gel 15 Gram(s) Oral once PRN Blood Glucose LESS THAN 70 milliGRAM(s)/deciliter  glucagon  Injectable 1 milliGRAM(s) IntraMuscular once PRN Glucose LESS THAN 70 milligrams/deciliter      Allergies    No Known Allergies    Intolerances        Vital Signs Last 24 Hrs  T(C): 36.8 (19 May 2020 07:39), Max: 36.8 (19 May 2020 07:39)  T(F): 98.3 (19 May 2020 07:39), Max: 98.3 (19 May 2020 07:39)  HR: 55 (19 May 2020 07:39) (53 - 69)  BP: 110/58 (19 May 2020 07:39) (83/36 - 122/-)  BP(mean): 66 (19 May 2020 04:42) (59 - 78)  RR: 20 (19 May 2020 07:39) (18 - 22)  SpO2: 96% (19 May 2020 07:39) (94% - 97%)    LABS:                        10.3   11.89 )-----------( 259      ( 18 May 2020 13:18 )             32.3         136  |  98  |  54.0<H>  ----------------------------<  124<H>  4.6   |  22.0  |  1.78<H>    Ca    9.5      18 May 2020 13:18  Mg     2.8         TPro  6.6  /  Alb  3.1<L>  /  TBili  9.5<H>  /  DBili  8.6<H>  /  AST  64<H>  /  ALT  116<H>  /  AlkPhos  523<H>      PT/INR - ( 18 May 2020 13:18 )   PT: 12.4 sec;   INR: 1.09 ratio    Cancer Antigen, GI Ca 19-9 (20 @ 01:14)    Cancer Antigen, GI Ca 19-9: 253: Method: Roche Electrochemiluminescence Immuno Assay  Values obtained with different assay methods or kits cannot be  used interchangeably.  Results cannot be interpreted as absolute evidence of the presence  or absence of malignant disease. U/mL         PTT - ( 18 May 2020 13:18 )  PTT:31.1 sec  Urinalysis Basic - ( 18 May 2020 18:49 )    Color: Yellow / Appearance: Slightly Turbid / S.015 / pH: x  Gluc: x / Ketone: Negative  / Bili: Moderate / Urobili: 1 mg/dL   Blood: x / Protein: 30 mg/dL / Nitrite: Negative   Leuk Esterase: Moderate / RBC: 0-2 /HPF / WBC >50   Sq Epi: x / Non Sq Epi: Moderate / Bacteria: Moderate        RADIOLOGY & ADDITIONAL TESTS:< from: CT Abdomen and Pelvis No Cont (20 @ 19:56) >     EXAM:  CT ABDOMEN AND PELVIS                          PROCEDURE DATE:  2020          INTERPRETATION:  CLINICAL INFORMATION: Acute kidney injury, pancreatitis, biliary obstruction    COMPARISON: None.    PROCEDURE:   CT of the Abdomen and Pelvis was performed without intravenous contrast.   Intravenous contrast: None.  Oral contrast: None.  Sagittal and coronal reformats were performed.    FINDINGS:    LOWER CHEST: Bibasilar dependent airspace opacities. 5 mm left lower lobe pulmonary nodule (3; 15). Coronary artery calcifications.    LIVER: No focal liver lesion.   BILE DUCTS: Moderate intra and extrahepatic biliary ductal dilatation. The common duct measures 2.0 cm in diameter. The distal common duct is not dilated.  GALLBLADDER: Distended and containing multiple dependent stones. No pericholecystic inflammatory changes.  SPLEEN: Within normal limits.  PANCREAS: Suboptimally evaluated due to lack of intravenous contrast. No ductal dilatation or definite mass identified.  ADRENALS: Within normal limits.  KIDNEYS/URETERS: No hydronephrosis. Bilateral cysts.    BLADDER: Within normal limits.  REPRODUCTIVE ORGANS: Prostate is enlarged.    BOWEL: No bowel obstruction. Appendix is normal.  PERITONEUM: No ascites.  VESSELS: Atherosclerotic changes.  RETROPERITONEUM/LYMPH NODES: Bulky upper abdominal and retroperitoneal lymphadenopathy with representative measurements as follows:  *:3.3 x 5.3 cm periportal node (3; 58)  *3.5 x 2.8 cm interaortocaval lymph node (3; 76)  *2.8 x 3.8 cm (para-aortic lymph node (3; 80).  *No significant pelvic sidewall or groin lymphadenopathy.    ABDOMINAL WALL: Within normal limits.  BONES: No aggressive lesion. Left hip arthroplasty.    IMPRESSION:     Moderate biliary ductal dilatation due tomass effect from periportal lymphadenopathy.    Additional bulky retroperitoneal lymphadenopathy. Findings are compatible with either metastatic disease or lymphoma.      Limited evaluation of the pancreas. No definite pancreatic mass identified. Suggest MRI for better evaluation.    Bibasilar dependent airspace opacities could represent atelectasis or pneumonia.                        TYSON GALVAN M.D.,ATTENDING RADIOLOGIST  This document has been electronically signed. May 18 2020  8:10PM                 < end of copied text >    < from: MR MRCP No Cont (20 @ 22:38) >    ******PRELIMINARY REPORT******    ******PRELIMINARY REPORT******           EXAM:  MR MRCP                          PROCEDURE DATE:  2020        ******PRELIMINARY REPORT******    ******PRELIMINARY REPORT******          INTERPRETATION:  [facility] RADIOLOGIST PRELIMINARY REPORT    PROCEDURE INFORMATION:   Exam: MR Abdomen Without and With Contrast   Exam date and time: 2020 9:55 PM   Age: 89 years old   Clinical indication: Screening exam; Other: Pain     TECHNIQUE:   Imaging protocol: MR ofthe abdomen without and with intravenous contrast.   3D rendering: MIP and/or 3D reconstructed images were created by the   technologist.     COMPARISON:   CT ABDOMEN AND PELVIS 2020 7:45 PM     FINDINGS:   Liver: No mass.   Gallbladder and bileducts: Gallbladder is distended. There is cholelithiasis.   No gallbladder wall thickening or pericholecystic fluid/inflammation to   indicate acute cholecystitis. There is intra and extrahepatic biliary ductal   dilatation with dilatation of the extrahepatic bile duct up to 1 cm in caliber.   The common bile duct abruptly transitions to an imperceptible duct in the head   of the pancreas. The head of the pancreas is subjectively mildly prominent but   there is no definite discrete pancreatic head malignancy. It is unclear if the   CBD compression is due to an occult pancreatic head mass or mass effect from   the closely adjacent bulky retroperitoneal lymphadenopathy immediately   posterior to the head of the pancreas.   Pancreas: See &quot;Gallbladder and bile ducts&quot; finding.   Spleen: Unremarkable. No splenomegaly.   Adrenals: Unremarkable. No mass.   Kidneys and ureters: Bilateral renal cysts.   Stomach and bowel: Visualized stomach and intestines are unremarkable.   Intraperitoneal space: No free fluid.   Arteries: No abdominal aortic aneurysm.   Bones/joints: Unremarkable.    Soft tissues: Unremarkable.     IMPRESSION:   There is intra and extrahepatic biliary ductal dilatation with dilatation of   the extrahepatic bile ductup to 1 cm in caliber. The common bile duct abruptly   transitions to an imperceptible duct in the head of the pancreas. The head of   the pancreas is subjectively mildly prominent but there is no definite discrete   pancreatic head malignancy. It isunclear if the CBD compression is due to an   occult pancreatic head mass or mass effect from the closely adjacent bulky   retroperitoneal lymphadenopathy immediately posterior to the head of the   pancreas.          ******PRELIMINARY REPORT******    ******PRELIMINARY REPORT******              JOSEPH BLAKE M.D.;[facility] RADIOLOGIST                    < end of copied text >  Cancer Antigen, GI Ca 19-9 (20 @ 01:14)    Cancer Antigen, GI Ca 19-9: 253: Method: Roche Electrochemiluminescence Immuno Assay  Values obtained with different assay methods or kits cannot be  used interchangeably.  Results cannot be interpreted as absolute evidence of the presence  or absence of malignant disease. U/mL

## 2020-05-19 NOTE — CONSULT NOTE ADULT - ASSESSMENT
Pt is a 88 y/o male with medical history of BPH, HTN, HLD, CKD, OA, s/p LHA (6 months ago), scalp melanoma resection, who presents to [facility] for jaundice. Pt states for past two weeks pt c/o of lightheadedness/dizziness, and loss of appetite worsening within the last 2 days. Pt went to PCP who assessed pt and noticed pt was jaundiced and sent pt to [facility]-ED. US Abd revealed intrahepatic and CBD dilation, numerous gallstones, CT Abd/Pelvis revealed moderate biliary ductal dilation d/t mass effect from periportal lymphadenopathy compatible with metastatic dx or lymphoma. GI team recommended MRCP showed moderate biliary ductal dilatino with narrowing of common bilae duct and pancreatic duct in the pancreatitc head. Findings could be d/t occult pancreatic head mass vs. obstruction from bulky periportal and peripancreatic lymphadenopathy. Radiology findings coincided with lab results - AST/ALT- 61/99, Lipase- >3000, Amylase- 866. Plan for pt to undergo ERCP procedure as per GI team recommendations. Pt Denies fever, chills, cough, phlegm production, shortness of breath, dyspnea on exertion, orthopnea, PND, edema, chest pain, pressure, palpitations, irregular, fast heart beat, nausea, vomiting, melena, rectal bleed, hematuria, lightheadedness, dizziness, syncope, near syncope. Functional status- 4mets.     Cardiac Risk Stratification  - RCRI Risk score 1, Class II risk 0.9% risk of major cardiac event  -Gann Perioperative risk for BRONWYN score 0.9%  -Echo- negative as per pt, completed 6-7 months ago prior to ortho sx  -Stress-negative as per pt, completed 6-7 months ago prior to ortho sx  -Pt is a low cardiac risk for low risk procedure, benefits outweigh risks  -Pt denies any hx cardiac complaints  -No further cardiac testing needed prior to procedure  -Will attempt to obtain TTE and stress testing records  -If No active chest pain, evidence of ischemia, decompensated CHF, significant valvular abnormality, or unstable arrhythmia and therefore no absolute cardiac contraindication to the planned surgical procedure Pt is a 89 y/o male with medical history of BPH, HTN, HLD, CKD, OA, s/p LHA (6 months ago), scalp melanoma resection, who presents to [facility] for jaundice. Pt states for past two weeks pt c/o of lightheadedness/dizziness, and loss of appetite worsening within the last 2 days. Pt went to PCP who assessed pt and noticed pt was jaundiced and sent pt to [facility]-ED. US Abd revealed intrahepatic and CBD dilation, numerous gallstones, CT Abd/Pelvis revealed moderate biliary ductal dilation d/t mass effect from periportal lymphadenopathy compatible with metastatic dx or lymphoma. GI team recommended MRCP showed moderate biliary ductal dilatino with narrowing of common bilae duct and pancreatic duct in the pancreatitc head. Findings could be d/t occult pancreatic head mass vs. obstruction from bulky periportal and peripancreatic lymphadenopathy. Radiology findings coincided with lab results - AST/ALT- 61/99, Lipase- >3000, Amylase- 866. Plan for pt to undergo ERCP procedure as per GI team recommendations. Pt Denies fever, chills, cough, phlegm production, shortness of breath, dyspnea on exertion, orthopnea, PND, edema, chest pain, pressure, palpitations, irregular, fast heart beat, nausea, vomiting, melena, rectal bleed, hematuria, lightheadedness, dizziness, syncope, near syncope. Functional status- 4mets.     Cardiac Risk Stratification  - RCRI Risk score 1, Class I risk 0.4% risk of major cardiac event  -Gann Perioperative risk for BRONWYN score 0.9%  -Echo- negative as per pt, completed 6-7 months ago prior to ortho sx  -Stress-negative as per pt, completed 6-7 months ago prior to ortho sx  -Pt is a low cardiac risk for low risk procedure, benefits outweigh risks  -Pt denies any hx cardiac complaints  -No further cardiac testing needed prior to procedure  -Will attempt to obtain TTE and stress testing records  -If No active chest pain, evidence of ischemia, decompensated CHF, significant valvular abnormality, or unstable arrhythmia and therefore no absolute cardiac contraindication to the planned surgical procedure

## 2020-05-19 NOTE — PROGRESS NOTE ADULT - ATTENDING COMMENTS
Patient seen and examined with resident team this morning.    Admitted with obstructive jaundice and awaiting ERCP, EUS in AM.  Appreciate GI and cardiology input.  Medically cleared for GI procedure in AM.  Remainder of plan as per resident note above.

## 2020-05-19 NOTE — PROGRESS NOTE ADULT - SUBJECTIVE AND OBJECTIVE BOX
Patient is a 89y old  Male who presents with a chief complaint of obstructive jaundice with concern for malignancy (19 May 2020 07:47)    INTERVAL/OVERNIGHT EVENTS:  Patient examined at beside. No acute events over night.    CAPILLARY BLOOD GLUCOSE  POCT Blood Glucose.: 119 mg/dL (19 May 2020 07:48)  POCT Blood Glucose.: 160 mg/dL (19 May 2020 01:42)    V/S:  T(F): 98.3 (19 May 2020 07:39), Max: 98.3 (19 May 2020 07:39)  HR: 55 (19 May 2020 07:39) (53 - 69)  BP: 110/58 (19 May 2020 07:39) (83/36 - 122/-)  BP(mean): 66 (19 May 2020 04:42) (59 - 78)  RR: 20 (19 May 2020 07:39) (18 - 22)  SpO2: 96% (19 May 2020 07:39) (94% - 97%)    General: NAD, resting comfortably in bed  	Head: normocephalic, atraumatic  	Eyes: PERRLA 3mm b/l, EOMI, +scleral icterus  	Throat: non-erythematous, MMM  	Neck: supple, no cervical lymphadenopathy, thyroid normal  	Pulm: unlabored breathing, CTA bilaterally, no crackles or wheezes  	Cardio: S1S2+, RRR, no murmurs  	GI: soft, BS+ normoactive, non-distended, non-tender, no guarding or rigidity, no palpable inguinal lymphadenopathy. no cva tenderness  	Vascular: no pitting edema, DP pulses 2+ b/l, no calf tenderness, cap refill <3 secs  	MSK: FROM in all extremities  	Neuro: AAOx3, no gross focal deficits  Skin: warm and dry, visibly jaundiced (head, trunk, extremities)    LABS                          10.3   11.89 )-----------( 259      ( 18 May 2020 13:18 )             32.3         05-18    136  |  98  |  54.0<H>  ----------------------------<  124<H>  4.6   |  22.0  |  1.78<H>    Ca    9.5      18 May 2020 13:18  Mg     2.8     05-18    TPro  6.6  /  Alb  3.1<L>  /  TBili  9.5<H>  /  DBili  8.6<H>  /  AST  64<H>  /  ALT  116<H>  /  AlkPhos  523<H>  05-18    CARDIAC MARKERS ( 19 May 2020 00:54 )  x     / 0.09 ng/mL / 65 U/L / x     / x      CARDIAC MARKERS ( 18 May 2020 13:18 )  x     / 0.11 ng/mL / x     / x     / x        LIVER FUNCTIONS - ( 18 May 2020 13:18 )  Alb: 3.1 g/dL / Pro: 6.6 g/dL / ALK PHOS: 523 U/L / ALT: 116 U/L / AST: 64 U/L / GGT: x           PT/INR - ( 18 May 2020 13:18 )   PT: 12.4 sec;   INR: 1.09 ratio    PTT - ( 18 May 2020 13:18 )  PTT:31.1 sec      IMAGING      DIET:    MEDICATIONS  (STANDING):  aspirin enteric coated 81 milliGRAM(s) Oral daily  cefTRIAXone   IVPB 1000 milliGRAM(s) IV Intermittent every 24 hours  dextrose 5%. 1000 milliLiter(s) (50 mL/Hr) IV Continuous <Continuous>  dextrose 50% Injectable 12.5 Gram(s) IV Push once  dextrose 50% Injectable 25 Gram(s) IV Push once  dextrose 50% Injectable 25 Gram(s) IV Push once  finasteride 5 milliGRAM(s) Oral daily  insulin lispro (HumaLOG) corrective regimen sliding scale   SubCutaneous three times a day before meals  insulin lispro (HumaLOG) corrective regimen sliding scale   SubCutaneous at bedtime  multiple electrolytes Injection Type 1 1000 milliLiter(s) (125 mL/Hr) IV Continuous <Continuous>  saccharomyces boulardii 250 milliGRAM(s) Oral two times a day  tamsulosin 0.4 milliGRAM(s) Oral at bedtime    MEDICATIONS  (PRN):  acetaminophen   Tablet .. 650 milliGRAM(s) Oral every 6 hours PRN Temp greater or equal to 38C (100.4F), Mild Pain (1 - 3)  dextrose 40% Gel 15 Gram(s) Oral once PRN Blood Glucose LESS THAN 70 milliGRAM(s)/deciliter  glucagon  Injectable 1 milliGRAM(s) IntraMuscular once PRN Glucose LESS THAN 70 milligrams/deciliter Patient is a 89y old  Male who presents with a chief complaint of obstructive jaundice with concern for malignancy (19 May 2020 07:47)    INTERVAL/OVERNIGHT EVENTS:  Patient examined at beside. No acute events over night. States that he is feeling well except that he feels itchy. Patient states that he has had a 30-40 pound weight loss in the last few months. Patient has had decreased appetite, but denies any fevers, chills, nausea/vomiting, change in bowel pattern, abdominal pain, diarrhea.     CAPILLARY BLOOD GLUCOSE  POCT Blood Glucose.: 119 mg/dL (19 May 2020 07:48)  POCT Blood Glucose.: 160 mg/dL (19 May 2020 01:42)    V/S:  T(F): 98.3 (19 May 2020 07:39), Max: 98.3 (19 May 2020 07:39)  HR: 55 (19 May 2020 07:39) (53 - 69)  BP: 110/58 (19 May 2020 07:39) (83/36 - 122/-)  BP(mean): 66 (19 May 2020 04:42) (59 - 78)  RR: 20 (19 May 2020 07:39) (18 - 22)  SpO2: 96% (19 May 2020 07:39) (94% - 97%)    General: NAD, resting comfortably in bed  	Head: normocephalic, atraumatic  	Eyes: PERRLA 3mm b/l, EOMI, +scleral icterus  	Neck: supple, no cervical lymphadenopathy, thyroid normal  	Pulm: unlabored breathing, CTA bilaterally, no crackles or wheezes  	Cardio: S1S2+, RRR, no murmurs  	GI: soft, BS+ normoactive, non-distended, non-tender, no guarding or rigidity, no palpable inguinal lymphadenopathy.   	Vascular: no pitting edema, DP pulses 2+ b/l, no calf tenderness  	MSK: FROM in all extremities  	Neuro: AAOx3, no gross focal deficits  Skin: warm and dry, visibly jaundiced (head, trunk, extremities)    LABS                          10.3   11.89 )-----------( 259      ( 18 May 2020 13:18 )             32.3         05-18    136  |  98  |  54.0<H>  ----------------------------<  124<H>  4.6   |  22.0  |  1.78<H>    Ca    9.5      18 May 2020 13:18  Mg     2.8     05-18    TPro  6.6  /  Alb  3.1<L>  /  TBili  9.5<H>  /  DBili  8.6<H>  /  AST  64<H>  /  ALT  116<H>  /  AlkPhos  523<H>  05-18    CARDIAC MARKERS ( 19 May 2020 00:54 )  x     / 0.09 ng/mL / 65 U/L / x     / x      CARDIAC MARKERS ( 18 May 2020 13:18 )  x     / 0.11 ng/mL / x     / x     / x        LIVER FUNCTIONS - ( 18 May 2020 13:18 )  Alb: 3.1 g/dL / Pro: 6.6 g/dL / ALK PHOS: 523 U/L / ALT: 116 U/L / AST: 64 U/L / GGT: x           PT/INR - ( 18 May 2020 13:18 )   PT: 12.4 sec;   INR: 1.09 ratio    PTT - ( 18 May 2020 13:18 )  PTT:31.1 sec    IMAGING:   < from: MR MRCP No Cont (05.18.20 @ 22:38) >  IMPRESSION:   Moderate biliary ductal dilatation with narrowing of thecommon bile duct and pancreatic duct in the pancreatic head. No discrete pancreatic head mass is identified.  Findings could be due to occult pancreatic head mass versus obstruction from bulky periportal and peripancreatic lymphadenopathy.  A preliminary report was provided by the [facility] radiologist.    DIET: clear liquid diet    MEDICATIONS  (STANDING):  aspirin enteric coated 81 milliGRAM(s) Oral daily  cefTRIAXone   IVPB 1000 milliGRAM(s) IV Intermittent every 24 hours  dextrose 5%. 1000 milliLiter(s) (50 mL/Hr) IV Continuous <Continuous>  dextrose 50% Injectable 12.5 Gram(s) IV Push once  dextrose 50% Injectable 25 Gram(s) IV Push once  dextrose 50% Injectable 25 Gram(s) IV Push once  finasteride 5 milliGRAM(s) Oral daily  insulin lispro (HumaLOG) corrective regimen sliding scale   SubCutaneous three times a day before meals  insulin lispro (HumaLOG) corrective regimen sliding scale   SubCutaneous at bedtime  multiple electrolytes Injection Type 1 1000 milliLiter(s) (125 mL/Hr) IV Continuous <Continuous>  saccharomyces boulardii 250 milliGRAM(s) Oral two times a day  tamsulosin 0.4 milliGRAM(s) Oral at bedtime    MEDICATIONS  (PRN):  acetaminophen   Tablet .. 650 milliGRAM(s) Oral every 6 hours PRN Temp greater or equal to 38C (100.4F), Mild Pain (1 - 3)  dextrose 40% Gel 15 Gram(s) Oral once PRN Blood Glucose LESS THAN 70 milliGRAM(s)/deciliter  glucagon  Injectable 1 milliGRAM(s) IntraMuscular once PRN Glucose LESS THAN 70 milligrams/deciliter

## 2020-05-19 NOTE — CONSULT NOTE ADULT - ATTENDING COMMENTS
Patient seen and examined by me.  I have discussed my recommendation with the PA which are outlined above.  NO ABSOLUTE CONTRAINDICATIONS TO PROCEED WITH GI PROCEDURE, NO OTHER CARDIAC TESTING IS WARRANTED.  Will Sign off

## 2020-05-19 NOTE — CONSULT NOTE ADULT - ASSESSMENT
Patient with obstructive jaundice, abdominal lymphadenopathy. ? Pancreatic mass. Elevated Ca 19-9. Please have cardiology evaluation and then will schedule the patient for EUS/ERCP. Start clear liquid diet today

## 2020-05-20 ENCOUNTER — RESULT REVIEW (OUTPATIENT)
Age: 85
End: 2020-05-20

## 2020-05-20 LAB
ANION GAP SERPL CALC-SCNC: 14 MMOL/L — SIGNIFICANT CHANGE UP (ref 5–17)
BASOPHILS # BLD AUTO: 0.05 K/UL — SIGNIFICANT CHANGE UP (ref 0–0.2)
BASOPHILS NFR BLD AUTO: 0.2 % — SIGNIFICANT CHANGE UP (ref 0–2)
BUN SERPL-MCNC: 46 MG/DL — HIGH (ref 8–20)
CALCIUM SERPL-MCNC: 9 MG/DL — SIGNIFICANT CHANGE UP (ref 8.6–10.2)
CHLORIDE SERPL-SCNC: 105 MMOL/L — SIGNIFICANT CHANGE UP (ref 98–107)
CO2 SERPL-SCNC: 19 MMOL/L — LOW (ref 22–29)
CREAT SERPL-MCNC: 1.05 MG/DL — SIGNIFICANT CHANGE UP (ref 0.5–1.3)
EOSINOPHIL # BLD AUTO: 0.04 K/UL — SIGNIFICANT CHANGE UP (ref 0–0.5)
EOSINOPHIL NFR BLD AUTO: 0.2 % — SIGNIFICANT CHANGE UP (ref 0–6)
GLUCOSE BLDC GLUCOMTR-MCNC: 105 MG/DL — HIGH (ref 70–99)
GLUCOSE BLDC GLUCOMTR-MCNC: 159 MG/DL — HIGH (ref 70–99)
GLUCOSE BLDC GLUCOMTR-MCNC: 92 MG/DL — SIGNIFICANT CHANGE UP (ref 70–99)
GLUCOSE SERPL-MCNC: 107 MG/DL — HIGH (ref 70–99)
HCT VFR BLD CALC: 36.3 % — LOW (ref 39–50)
HGB BLD-MCNC: 11.7 G/DL — LOW (ref 13–17)
IMM GRANULOCYTES NFR BLD AUTO: 0.4 % — SIGNIFICANT CHANGE UP (ref 0–1.5)
LYMPHOCYTES # BLD AUTO: 0.66 K/UL — LOW (ref 1–3.3)
LYMPHOCYTES # BLD AUTO: 3.3 % — LOW (ref 13–44)
MCHC RBC-ENTMCNC: 29.7 PG — SIGNIFICANT CHANGE UP (ref 27–34)
MCHC RBC-ENTMCNC: 32.2 GM/DL — SIGNIFICANT CHANGE UP (ref 32–36)
MCV RBC AUTO: 92.1 FL — SIGNIFICANT CHANGE UP (ref 80–100)
MONOCYTES # BLD AUTO: 1.16 K/UL — HIGH (ref 0–0.9)
MONOCYTES NFR BLD AUTO: 5.7 % — SIGNIFICANT CHANGE UP (ref 2–14)
NEUTROPHILS # BLD AUTO: 18.2 K/UL — HIGH (ref 1.8–7.4)
NEUTROPHILS NFR BLD AUTO: 90.2 % — HIGH (ref 43–77)
PLATELET # BLD AUTO: 217 K/UL — SIGNIFICANT CHANGE UP (ref 150–400)
POTASSIUM SERPL-MCNC: 4.4 MMOL/L — SIGNIFICANT CHANGE UP (ref 3.5–5.3)
POTASSIUM SERPL-SCNC: 4.4 MMOL/L — SIGNIFICANT CHANGE UP (ref 3.5–5.3)
RBC # BLD: 3.94 M/UL — LOW (ref 4.2–5.8)
RBC # FLD: 15.1 % — HIGH (ref 10.3–14.5)
SODIUM SERPL-SCNC: 138 MMOL/L — SIGNIFICANT CHANGE UP (ref 135–145)
WBC # BLD: 20.2 K/UL — HIGH (ref 3.8–10.5)
WBC # FLD AUTO: 20.2 K/UL — HIGH (ref 3.8–10.5)

## 2020-05-20 PROCEDURE — 43274 ERCP DUCT STENT PLACEMENT: CPT | Mod: 59

## 2020-05-20 PROCEDURE — 43239 EGD BIOPSY SINGLE/MULTIPLE: CPT | Mod: 59

## 2020-05-20 PROCEDURE — 88341 IMHCHEM/IMCYTCHM EA ADD ANTB: CPT | Mod: 26

## 2020-05-20 PROCEDURE — 99233 SBSQ HOSP IP/OBS HIGH 50: CPT

## 2020-05-20 PROCEDURE — 88342 IMHCHEM/IMCYTCHM 1ST ANTB: CPT | Mod: 26,59

## 2020-05-20 PROCEDURE — 88342 IMHCHEM/IMCYTCHM 1ST ANTB: CPT | Mod: 26

## 2020-05-20 PROCEDURE — 43242 EGD US FINE NEEDLE BX/ASPIR: CPT

## 2020-05-20 PROCEDURE — 88305 TISSUE EXAM BY PATHOLOGIST: CPT | Mod: 26,59

## 2020-05-20 PROCEDURE — 88305 TISSUE EXAM BY PATHOLOGIST: CPT | Mod: 26

## 2020-05-20 PROCEDURE — 99222 1ST HOSP IP/OBS MODERATE 55: CPT

## 2020-05-20 PROCEDURE — 88173 CYTOPATH EVAL FNA REPORT: CPT | Mod: 26

## 2020-05-20 RX ORDER — HEPARIN SODIUM 5000 [USP'U]/ML
5000 INJECTION INTRAVENOUS; SUBCUTANEOUS EVERY 12 HOURS
Refills: 0 | Status: DISCONTINUED | OUTPATIENT
Start: 2020-05-20 | End: 2020-05-26

## 2020-05-20 RX ORDER — INDOMETHACIN 50 MG
100 CAPSULE ORAL ONCE
Refills: 0 | Status: DISCONTINUED | OUTPATIENT
Start: 2020-05-20 | End: 2020-05-26

## 2020-05-20 RX ORDER — ERTAPENEM SODIUM 1 G/1
1000 INJECTION, POWDER, LYOPHILIZED, FOR SOLUTION INTRAMUSCULAR; INTRAVENOUS ONCE
Refills: 0 | Status: DISCONTINUED | OUTPATIENT
Start: 2020-05-20 | End: 2020-05-21

## 2020-05-20 RX ORDER — PANTOPRAZOLE SODIUM 20 MG/1
40 TABLET, DELAYED RELEASE ORAL
Refills: 0 | Status: DISCONTINUED | OUTPATIENT
Start: 2020-05-20 | End: 2020-05-26

## 2020-05-20 RX ADMIN — FINASTERIDE 5 MILLIGRAM(S): 5 TABLET, FILM COATED ORAL at 17:12

## 2020-05-20 RX ADMIN — URSODIOL 250 MILLIGRAM(S): 250 TABLET, FILM COATED ORAL at 18:49

## 2020-05-20 RX ADMIN — Medication 250 MILLIGRAM(S): at 17:12

## 2020-05-20 RX ADMIN — Medication 250 MILLIGRAM(S): at 05:47

## 2020-05-20 RX ADMIN — TAMSULOSIN HYDROCHLORIDE 0.4 MILLIGRAM(S): 0.4 CAPSULE ORAL at 21:52

## 2020-05-20 RX ADMIN — CEFTRIAXONE 100 MILLIGRAM(S): 500 INJECTION, POWDER, FOR SOLUTION INTRAMUSCULAR; INTRAVENOUS at 21:52

## 2020-05-20 RX ADMIN — HEPARIN SODIUM 5000 UNIT(S): 5000 INJECTION INTRAVENOUS; SUBCUTANEOUS at 17:12

## 2020-05-20 RX ADMIN — PANTOPRAZOLE SODIUM 40 MILLIGRAM(S): 20 TABLET, DELAYED RELEASE ORAL at 17:21

## 2020-05-20 RX ADMIN — Medication 650 MILLIGRAM(S): at 05:47

## 2020-05-20 NOTE — CONSULT NOTE ADULT - ASSESSMENT
89 y/o male presents with bulky periportal adenopathy and obstructive jaundice.  ERCP reveals necrotic duodenal ulcer - biopsy pending. Now S/P uncovered metal bile duct stent placement.  Will review pathology once available and discuss with Radiation Oncology and Surgical Oncology for multidisciplinary treatment decisions

## 2020-05-20 NOTE — BRIEF OPERATIVE NOTE - NSICDXBRIEFPREOP_GEN_ALL_CORE_FT
PRE-OP DIAGNOSIS:  Abdominal lymphadenopathy 20-May-2020 11:09:18  Dandre Dutta  Obstructive jaundice 20-May-2020 11:08:57  Dandre Dutta

## 2020-05-20 NOTE — BRIEF OPERATIVE NOTE - NSICDXBRIEFPOSTOP_GEN_ALL_CORE_FT
POST-OP DIAGNOSIS:  Bile duct stricture 20-May-2020 11:09:49  Dandre Dutta  Abdominal lymphadenopathy 20-May-2020 11:09:38  Dandre Dutta  Duodenal ulcer 20-May-2020 11:09:26  Dandre Dutta

## 2020-05-20 NOTE — PROGRESS NOTE ADULT - SUBJECTIVE AND OBJECTIVE BOX
Patient is a 89y old  Male who presents with a chief complaint of obstructive jaundice with concern for malignancy (19 May 2020 13:54)    INTERVAL/OVERNIGHT EVENTS:  Patient examined at beside. No acute events over night.    CAPILLARY BLOOD GLUCOSE  POCT Blood Glucose.: 92 mg/dL (20 May 2020 05:50)  POCT Blood Glucose.: 130 mg/dL (19 May 2020 21:40)  POCT Blood Glucose.: 135 mg/dL (19 May 2020 15:30)    V/S:  T(F): 98.8 (20 May 2020 12:19), Max: 99.2 (19 May 2020 23:16)  HR: 86 (20 May 2020 12:19) (58 - 86)  BP: 107/57 (20 May 2020 12:19) (107/57 - 132/62)  RR: 20 (20 May 2020 12:19) (20 - 20)  SpO2: 95% (20 May 2020 12:19) (95% - 96%)    General: NAD, resting comfortably in bed  	Head: normocephalic, atraumatic  	Eyes: PERRLA 3mm b/l, EOMI, +scleral icterus  	Neck: supple, no cervical lymphadenopathy, thyroid normal  	Pulm: unlabored breathing, CTA bilaterally, no crackles or wheezes  	Cardio: S1S2+, RRR, no murmurs  	GI: soft, BS+ normoactive, non-distended, non-tender, no guarding or rigidity, no palpable inguinal lymphadenopathy.   	Vascular: no pitting edema, DP pulses 2+ b/l, no calf tenderness  	MSK: FROM in all extremities  	Neuro: AAOx3, no gross focal deficits  Skin: warm and dry, visibly jaundiced (head, trunk, extremities)    LABS                          11.7   20.20 )-----------( 217      ( 20 May 2020 12:20 )             36.3         05-20    138  |  105  |  46.0<H>  ----------------------------<  107<H>  4.4   |  19.0<L>  |  1.05    Ca    9.0      20 May 2020 12:20    TPro  6.0<L>  /  Alb  3.0<L>  /  TBili  9.0<H>  /  DBili  x   /  AST  61<H>  /  ALT  99<H>  /  AlkPhos  482<H>  05-19    CARDIAC MARKERS ( 19 May 2020 08:29 )  x     / 0.07 ng/mL / 83 U/L / x     / x      CARDIAC MARKERS ( 19 May 2020 00:54 )  x     / 0.09 ng/mL / 65 U/L / x     / x        LIVER FUNCTIONS - ( 19 May 2020 08:29 )  Alb: 3.0 g/dL / Pro: 6.0 g/dL / ALK PHOS: 482 U/L / ALT: 99 U/L / AST: 61 U/L / GGT: x         Culture - Urine (collected 19 May 2020 01:54)  Source: .Urine Clean Catch (Midstream)  Final Report (19 May 2020 22:18):    <10,000 CFU/mL Normal Urogenital Nicole    IMAGING:     EGD: Necrotic duodenal ulcer in the inferior wall-3 cm. Biopsies performed on heaped up margin. Random gastric biopsies  EUS: Large lymphadenopathy around portal vein and pancreas. No FNA performed due to large ulcer  Multiple hypoechoic lymph nodes noted around perigastric/celiac area. s/p FNA x 4  ERCP; Biliary cannulation achieved with ease. Cholangiogram performed. Unclear length of biliary stricture. Proximal ductal dilation. Then sphincterotomy performed. Using extractor balloon, cholangiogram performed.3 cm distal bile duct stricture noted. 10 mm x 80 mm uncovered metal bile duct stent was placed    DIET:    MEDICATIONS  (STANDING):  aspirin enteric coated 81 milliGRAM(s) Oral daily  cefTRIAXone   IVPB 1000 milliGRAM(s) IV Intermittent every 24 hours  dextrose 5%. 1000 milliLiter(s) (50 mL/Hr) IV Continuous <Continuous>  dextrose 50% Injectable 12.5 Gram(s) IV Push once  dextrose 50% Injectable 25 Gram(s) IV Push once  dextrose 50% Injectable 25 Gram(s) IV Push once  ertapenem  IVPB 1000 milliGRAM(s) IV Intermittent once  finasteride 5 milliGRAM(s) Oral daily  hydrALAZINE Injectable 5 milliGRAM(s) IV Push every 6 hours  indomethacin Suppository 100 milliGRAM(s) Rectal once  insulin lispro (HumaLOG) corrective regimen sliding scale   SubCutaneous three times a day before meals  insulin lispro (HumaLOG) corrective regimen sliding scale   SubCutaneous at bedtime  pantoprazole    Tablet 40 milliGRAM(s) Oral two times a day  saccharomyces boulardii 250 milliGRAM(s) Oral two times a day  tamsulosin 0.4 milliGRAM(s) Oral at bedtime  ursodiol Tablet 250 milliGRAM(s) Oral <User Schedule>    MEDICATIONS  (PRN):  acetaminophen   Tablet .. 650 milliGRAM(s) Oral every 6 hours PRN Temp greater or equal to 38C (100.4F), Mild Pain (1 - 3)  dextrose 40% Gel 15 Gram(s) Oral once PRN Blood Glucose LESS THAN 70 milliGRAM(s)/deciliter  glucagon  Injectable 1 milliGRAM(s) IntraMuscular once PRN Glucose LESS THAN 70 milligrams/deciliter Patient is a 89y old  Male who presents with a chief complaint of obstructive jaundice with concern for malignancy (19 May 2020 13:54)    INTERVAL/OVERNIGHT EVENTS:  Patient examined at beside. No acute events over night, patient had been NPO in anticipation of EUS/ERCP this morning. Patient noted to have large necrotic ulcer in inferior wall, large lymphadenopathy around portal vein and pancreas, multiple LN around perigastric/celiac area, stent placed in bile duct. Patient doing well post procedure, tired, jaundiced. Diet to be advanced as tolerated.     CAPILLARY BLOOD GLUCOSE  POCT Blood Glucose.: 92 mg/dL (20 May 2020 05:50)  POCT Blood Glucose.: 130 mg/dL (19 May 2020 21:40)  POCT Blood Glucose.: 135 mg/dL (19 May 2020 15:30)    V/S:  T(F): 98.8 (20 May 2020 12:19), Max: 99.2 (19 May 2020 23:16)  HR: 86 (20 May 2020 12:19) (58 - 86)  BP: 107/57 (20 May 2020 12:19) (107/57 - 132/62)  RR: 20 (20 May 2020 12:19) (20 - 20)  SpO2: 95% (20 May 2020 12:19) (95% - 96%)    General: NAD, resting comfortably in bed, jaundiced  	Head: normocephalic, atraumatic, circular left scalp melanoma resection scar noted  	Neck: supple, no cervical lymphadenopathy, thyroid normal  	Pulm: unlabored breathing, CTA bilaterally, no crackles or wheezes  	Cardio: S1S2+, RRR, no murmurs  	GI: soft, BS+ normoactive, non-distended, non-tender, no guarding or rigidity, no palpable inguinal lymphadenopathy.   	Vascular: no pitting edema, DP pulses 2+ b/l, no calf tenderness  Skin: warm and dry, visibly jaundiced (head, trunk, extremities)    LABS                          11.7   20.20 )-----------( 217      ( 20 May 2020 12:20 )             36.3         05-20    138  |  105  |  46.0<H>  ----------------------------<  107<H>  4.4   |  19.0<L>  |  1.05    Ca    9.0      20 May 2020 12:20    TPro  6.0<L>  /  Alb  3.0<L>  /  TBili  9.0<H>  /  DBili  x   /  AST  61<H>  /  ALT  99<H>  /  AlkPhos  482<H>  05-19    CARDIAC MARKERS ( 19 May 2020 08:29 )  x     / 0.07 ng/mL / 83 U/L / x     / x      CARDIAC MARKERS ( 19 May 2020 00:54 )  x     / 0.09 ng/mL / 65 U/L / x     / x        LIVER FUNCTIONS - ( 19 May 2020 08:29 )  Alb: 3.0 g/dL / Pro: 6.0 g/dL / ALK PHOS: 482 U/L / ALT: 99 U/L / AST: 61 U/L / GGT: x         Culture - Urine (collected 19 May 2020 01:54)  Source: .Urine Clean Catch (Midstream)  Final Report (19 May 2020 22:18):    <10,000 CFU/mL Normal Urogenital Nicole    IMAGING:   EGD: Necrotic duodenal ulcer in the inferior wall-3 cm. Biopsies performed on heaped up margin. Random gastric biopsies  EUS: Large lymphadenopathy around portal vein and pancreas. No FNA performed due to large ulcer  Multiple hypoechoic lymph nodes noted around perigastric/celiac area. s/p FNA x 4  ERCP; Biliary cannulation achieved with ease. Cholangiogram performed. Unclear length of biliary stricture. Proximal ductal dilation. Then sphincterotomy performed. Using extractor balloon, cholangiogram performed.3 cm distal bile duct stricture noted. 10 mm x 80 mm uncovered metal bile duct stent was placed    DIET: clear liquid, advance as tolerated    MEDICATIONS  (STANDING):  aspirin enteric coated 81 milliGRAM(s) Oral daily  cefTRIAXone   IVPB 1000 milliGRAM(s) IV Intermittent every 24 hours  dextrose 5%. 1000 milliLiter(s) (50 mL/Hr) IV Continuous <Continuous>  dextrose 50% Injectable 12.5 Gram(s) IV Push once  dextrose 50% Injectable 25 Gram(s) IV Push once  dextrose 50% Injectable 25 Gram(s) IV Push once  ertapenem  IVPB 1000 milliGRAM(s) IV Intermittent once  finasteride 5 milliGRAM(s) Oral daily  hydrALAZINE Injectable 5 milliGRAM(s) IV Push every 6 hours  indomethacin Suppository 100 milliGRAM(s) Rectal once  insulin lispro (HumaLOG) corrective regimen sliding scale   SubCutaneous three times a day before meals  insulin lispro (HumaLOG) corrective regimen sliding scale   SubCutaneous at bedtime  pantoprazole    Tablet 40 milliGRAM(s) Oral two times a day  saccharomyces boulardii 250 milliGRAM(s) Oral two times a day  tamsulosin 0.4 milliGRAM(s) Oral at bedtime  ursodiol Tablet 250 milliGRAM(s) Oral <User Schedule>    MEDICATIONS  (PRN):  acetaminophen   Tablet .. 650 milliGRAM(s) Oral every 6 hours PRN Temp greater or equal to 38C (100.4F), Mild Pain (1 - 3)  dextrose 40% Gel 15 Gram(s) Oral once PRN Blood Glucose LESS THAN 70 milliGRAM(s)/deciliter  glucagon  Injectable 1 milliGRAM(s) IntraMuscular once PRN Glucose LESS THAN 70 milligrams/deciliter
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
## 2020-05-20 NOTE — BRIEF OPERATIVE NOTE - OPERATION/FINDINGS
EGD: Necrotic duodenal ulcer in the inferior wall-3 cm. Biopsies performed on heaped up margin. Random gastric biopsies  EUS: Large lymphadenopathy around portal vein and pancreas. No FNA performed due to large ulcer  Multiple hypoechoic lymph nodes noted around perigastric/celiac area. s/p FNA x 4  ERCP; Biliary cannulation achieved with ease. Cholangiogram performed. Unclear length of biliary stricture. Proximal ductal dilation. Then sphincterotomy performed. Using extractor balloon, cholangiogram performed.3 cm distal bile duct stricture noted. 10 mm x 80 mm uncovered metal bile duct stent was placed

## 2020-05-20 NOTE — PROGRESS NOTE ADULT - ATTENDING COMMENTS
Patient seen and examined and discussed with resident team.    Patient is s/p EUS and ERCP.    Had biliary stent paced due to obstruction by lymphadenopathy.  EGD showed necrotic ulcer in duodenum which was biopsied.  Await pathology results.

## 2020-05-20 NOTE — PROGRESS NOTE ADULT - ASSESSMENT
89 yo male with pmh of BPH, HTN, HLD, CKD3a, OA s/p L BORIS, distant scalp melanoma resection, was sent from pmd's office for further evaluation of painless jaundice. Noted significantly elevated lipase >3000, bili 9.5, . ctap with biliary duct dilatation due to mass effect from retroperitoneal lymphadenopathy. Findings compatible with either metastatic disease or lymphoma. No definite pancreatic mass identified. Admitted for further eval. MRCP complete, now s/p EUS/ERCP.     Obstructive jaundice 2/2 occult pancreatic mass/obstruction  -with concern for malignancy   -CT A/P with biliary duct dilatation, unclear if from occult pancreatic head mass or mass effect from adjacent retroperitoneal LN  -MRCP notable for moderate biliary ductal dilation of CBD and pancreatic duct in the pancreatic head. No discrete head mass identified.   - Findings on MRCP suspicious for occult pancreatic head mass vs. obstruction from bulky periportal and pancreatic lymphadenopathy.   -numerous gall stones on sonogram, however no clinical signs of biliary colic  -Lipase >3000, Bili 9.5, but no clinical signs of pancreatitis  - started on clear liquid diet  - ursodiol QD for pruritis  - tumor markers elevated   - CEA, CA 19-9 elevated  - GI consult note seen, recs appreciated. Patient to have further eval with EUS/ERCP tomorrow  - cardio note seen, -Pt is a low cardiac risk for low risk procedure, benefits outweigh risks  - s/p EUS/ERCP 5/19  - advance diet as tolerated  - Monitor LFTs.  - Oncology evaluation.   - PPI b.i.d.   - Follow up biopsy results    UTI  -grossly positive UA and +urinary freq  - c/w empiric tx with Rocephin  - f/u urine cx and blood cxs    ABIGAIL on CKD3a  - resolved, creatinine now 1.20  -Cr 1.78 on arrival, worse from baseline ~1.4  -likely from dehydration due to poor oral intake  - d/c IVF hydration   - f/u am labs  - avoid nephrotoxic meds    HTN  -hypotensive on arrival, improved s/p ivf but still soft  - hold home dose losartan since BPs low  - prn hydralazine for SBP>160    Elevated troponin  -no active anginal symptoms, ekg without acute ischemic changes  -trend cardiac enzymes  -likely secondary to renal failure    Normocytic anemia  -Hb at baseline ~10  -no active bleeding  -follow up o/p with pmd for anemia work up    HLD  -pt take ezetimibe at home. unavailable at hospital. may resume on discharge    BPH  -chronic, stable   -c/w proscar and flomax    DVT ppx: scds. holding chemical AC for possible IR-guided intervention tomorrow, please start if no plans for intervention    Dispo: unclear LOS. pending further eval for source of possible malignancy 89 yo male with pmh of BPH, HTN, HLD, CKD3a, OA s/p L BORIS, distant scalp melanoma resection, was sent from pmd's office for further evaluation of painless jaundice. Noted significantly elevated lipase >3000, bili 9.5, . ctap with biliary duct dilatation due to mass effect from retroperitoneal lymphadenopathy. Findings compatible with either metastatic disease or lymphoma. No definite pancreatic mass identified. Admitted for further eval. MRCP complete, now s/p EUS/ERCP with noted nerotic duodenal ulcer, biopsies taken of heaped up margins, large lymphadenopathy around portal vein and pancreas, multiple LN around perigastric/celiac area (s/p FNA x4), distal bile duct stricture noted, stent placed within bile duct. Heme-Onc consult placed.    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 No discrete head mass identified.   -numerous gall stones on sonogram, however no clinical signs of biliary colic  -Lipase >3000, Bili 9.5, but no clinical signs of pancreatitis  - ursodiol QD for pruritis  - tumor markers CEA, CA 19-9 elevated  - advance diet as tolerated  - Monitor LFTs  - f/u Oncology evaluation.   - PPI BID   - Follow up biopsy results  - dc aspirin given necrotic ulcer    UTI  -grossly positive UA and +urinary freq  - c/w empiric tx with Rocephin  - urine culture with <90270 normal urogenital sakshi.    ABIGAIL on CKD3a  - resolved, creatinine now 1.20  -Cr 1.78 on arrival, worse from baseline ~1.4  -likely from dehydration due to poor oral intake  - d/c IVF hydration   - f/u am labs  - avoid nephrotoxic meds    HTN  -hypotensive on arrival, improved s/p ivf but still soft  - hold home dose losartan since BPs low  - prn hydralazine for SBP>160    Elevated troponin  -no active anginal symptoms, ekg without acute ischemic changes  -trend cardiac enzymes  -likely secondary to renal failure    Normocytic anemia  -Hb at baseline ~10  -no active bleeding  -follow up o/p with pmd for anemia work up    HLD  -pt take ezetimibe at home. unavailable at hospital.   - resume on discharge    BPH  -chronic, stable   -c/w proscar and flomax    DVT ppx:   heparin 5000 units SQ BID    Dispo: unclear LOS. pending eval by heme-onc

## 2020-05-20 NOTE — CONSULT NOTE ADULT - SUBJECTIVE AND OBJECTIVE BOX
REASON FOR CONSULTATION    HPI:  88 yo male with pmh of BPH, HTN, HLD, CKD3a, OA s/p L BORIS, distant scalp melanoma resection, was sent from pmd's office for further evaluation of painless jaundice. Reports low appetite and unintentional weight loss approx 20 lbs in last 2 months. No family hx of cancer. No hx of EtOH abuse. No prior incidents of jaundice. No hx of pancreatitis or biliary colic. Distant colonoscopy about 10 yrs ago was negative. Also reports urinary frequency for the past 2 weeks. Denies dysuria or urgency. No flank pain, fevers, chills, n/v, abd pain, or bleeding from anywhere.     ED course: Noted to be hypotensive on arrival with BP 83/36, improved to 113/53 s/p 1L NS. Noted significantly elevated lipase >3000, bili 9.5, . ctap with biliary duct dilatation due to mass effect from retroperitoneal lymphadenopathy. Findings compatible with either metastatic disease or lymphoma. Limited eval of pancreas. No definite pancreatic mass identified. MRCP (no contrast due to low GFR) for further eval, and prelim report consistent with ct scan findings. (18 May 2020 23:14)    Today, Dr. Dutta performed ERCP -       · Operative Findings	EGD: Necrotic duodenal ulcer in the inferior wall-3 cm. Biopsies performed on heaped up margin. Random gastric biopsies EUS: Large lymphadenopathy around portal vein and pancreas. No FNA performed due to large ulcer Multiple hypoechoic lymph nodes noted around perigastric/celiac area. s/p FNA x 4 ERCP; Biliary cannulation achieved with ease. Cholangiogram performed. Unclear length of biliary stricture. Proximal ductal dilation. Then sphincterotomy performed. Using extractor balloon, cholangiogram performed.3 cm distal bile duct stricture noted. 10 mm x 80 mm uncovered metal bile duct stent was placed	        REVIEW OF SYSTEMS:    CONSTITUTIONAL:  fatigue  EYES: jaundice  RESPIRATORY: No cough, wheezing, chills or hemoptysis; No shortness of breath  CARDIOVASCULAR: No chest pain, palpitations, dizziness, or leg swelling  GENITOURINARY: +frequency, No hematuria, or incontinence  NEUROLOGICAL: No headaches, memory loss, loss of strength, numbness, or tremors  SKIN: Jaundice, pruritus  MUSCULOSKELETAL: No joint pain or swelling; No muscle, back, or extremity pain  HEME/LYMPH: No easy bruising, or bleeding gums      PAST MEDICAL & SURGICAL HISTORY:  BPH (benign prostatic hyperplasia)  HLD (hyperlipidemia)  HTN (hypertension)  [place] (hard of hearing)  OA (osteoarthritis)  Melanoma  H/O hernia repair  H/O melanoma excision: scalp  S/P cataract surgery      SOCIAL HISTORY:    FAMILY HISTORY:  No pertinent family history in first degree relatives          MEDICATIONS  (STANDING):  cefTRIAXone   IVPB 1000 milliGRAM(s) IV Intermittent every 24 hours  dextrose 5%. 1000 milliLiter(s) (50 mL/Hr) IV Continuous <Continuous>  dextrose 50% Injectable 12.5 Gram(s) IV Push once  dextrose 50% Injectable 25 Gram(s) IV Push once  dextrose 50% Injectable 25 Gram(s) IV Push once  ertapenem  IVPB 1000 milliGRAM(s) IV Intermittent once  finasteride 5 milliGRAM(s) Oral daily  heparin   Injectable 5000 Unit(s) SubCutaneous every 12 hours  hydrALAZINE Injectable 5 milliGRAM(s) IV Push every 6 hours  indomethacin Suppository 100 milliGRAM(s) Rectal once  insulin lispro (HumaLOG) corrective regimen sliding scale   SubCutaneous three times a day before meals  insulin lispro (HumaLOG) corrective regimen sliding scale   SubCutaneous at bedtime  pantoprazole    Tablet 40 milliGRAM(s) Oral two times a day  saccharomyces boulardii 250 milliGRAM(s) Oral two times a day  tamsulosin 0.4 milliGRAM(s) Oral at bedtime    MEDICATIONS  (PRN):  acetaminophen   Tablet .. 650 milliGRAM(s) Oral every 6 hours PRN Temp greater or equal to 38C (100.4F), Mild Pain (1 - 3)  dextrose 40% Gel 15 Gram(s) Oral once PRN Blood Glucose LESS THAN 70 milliGRAM(s)/deciliter  glucagon  Injectable 1 milliGRAM(s) IntraMuscular once PRN Glucose LESS THAN 70 milligrams/deciliter      Vital Signs Last 24 Hrs  T(C): 36.6 (20 May 2020 19:39), Max: 37.3 (19 May 2020 23:16)  T(F): 97.8 (20 May 2020 19:39), Max: 99.2 (19 May 2020 23:16)  HR: 72 (20 May 2020 19:39) (58 - 86)  BP: 104/63 (20 May 2020 19:39) (104/63 - 132/62)  BP(mean): --  RR: 18 (20 May 2020 19:39) (18 - 20)  SpO2: 97% (20 May 2020 19:39) (95% - 97%)    PHYSICAL EXAM:    GENERAL: Elderly, jaundiced, alert, hard-of hearing  EYES: jaundice  NECK: Supple, No JVD, Normal thyroid  NERVOUS SYSTEM:  Alert & Oriented X3, Good concentration; Motor Strength 5/5 B/L upper and lower extremities; DTRs 2+ intact and symmetric  CHEST/LUNG: Clear to percussion bilaterally; No rales, rhonchi, wheezing, or rubs  HEART: Regular rate and rhythm; No murmurs, rubs, or gallops  ABDOMEN: Soft, Nontender, Nondistended; Bowel sounds present  EXTREMITIES:  2+ Peripheral Pulses, No clubbing, cyanosis, or edema  LYMPH: No lymphadenopathy noted  SKIN: No rashes or lesions      LABS:                        11.7   20.20 )-----------( 217      ( 20 May 2020 12:20 )             36.3     05-20    138  |  105  |  46.0<H>  ----------------------------<  107<H>  4.4   |  19.0<L>  |  1.05    Ca    9.0      20 May 2020 12:20    TPro  6.0<L>  /  Alb  3.0<L>  /  TBili  9.0<H>  /  DBili  x   /  AST  61<H>  /  ALT  99<H>  /  AlkPhos  482<H>  05-19      Ca19-9 253    RADIOLOGY & ADDITIONAL STUDIES:     MRCP-   IMPRESSION:   Moderate biliary ductal dilatation with narrowing of the common bile duct and pancreatic duct in the pancreatic head. No discrete pancreatic head mass is identified.    Findings could be due to occult pancreatic head mass versus obstruction from bulky periportal and peripancreatic lymphadenopathy.

## 2020-05-20 NOTE — BRIEF OPERATIVE NOTE - NSICDXBRIEFPROCEDURE_GEN_ALL_CORE_FT
PROCEDURES:  Endoscopic retrograde cholangiopancreatography (ERCP) with sphincterotomy and insertion of stent 20-May-2020 11:08:24  Dandre Dutta  Fine needle aspiration, endoscopic, ultrasound guided, with cytology 20-May-2020 11:07:58  Dandre Dutta  EGD with biopsy 20-May-2020 11:07:40  Dandre Dutta

## 2020-05-21 ENCOUNTER — TRANSCRIPTION ENCOUNTER (OUTPATIENT)
Age: 85
End: 2020-05-21

## 2020-05-21 LAB
ALBUMIN SERPL ELPH-MCNC: 2.9 G/DL — LOW (ref 3.3–5.2)
ALP SERPL-CCNC: 454 U/L — HIGH (ref 40–120)
ALT FLD-CCNC: 75 U/L — HIGH
ANION GAP SERPL CALC-SCNC: 14 MMOL/L — SIGNIFICANT CHANGE UP (ref 5–17)
AST SERPL-CCNC: 51 U/L — HIGH
BILIRUB SERPL-MCNC: 8.6 MG/DL — HIGH (ref 0.4–2)
BUN SERPL-MCNC: 60 MG/DL — HIGH (ref 8–20)
CALCIUM SERPL-MCNC: 9.1 MG/DL — SIGNIFICANT CHANGE UP (ref 8.6–10.2)
CHLORIDE SERPL-SCNC: 102 MMOL/L — SIGNIFICANT CHANGE UP (ref 98–107)
CO2 SERPL-SCNC: 23 MMOL/L — SIGNIFICANT CHANGE UP (ref 22–29)
CREAT SERPL-MCNC: 1.91 MG/DL — HIGH (ref 0.5–1.3)
GLUCOSE BLDC GLUCOMTR-MCNC: 102 MG/DL — HIGH (ref 70–99)
GLUCOSE BLDC GLUCOMTR-MCNC: 107 MG/DL — HIGH (ref 70–99)
GLUCOSE BLDC GLUCOMTR-MCNC: 124 MG/DL — HIGH (ref 70–99)
GLUCOSE BLDC GLUCOMTR-MCNC: 95 MG/DL — SIGNIFICANT CHANGE UP (ref 70–99)
GLUCOSE SERPL-MCNC: 100 MG/DL — HIGH (ref 70–99)
HCT VFR BLD CALC: 34.7 % — LOW (ref 39–50)
HGB BLD-MCNC: 11.1 G/DL — LOW (ref 13–17)
LIDOCAIN IGE QN: 1781 U/L — HIGH (ref 22–51)
MCHC RBC-ENTMCNC: 29.2 PG — SIGNIFICANT CHANGE UP (ref 27–34)
MCHC RBC-ENTMCNC: 32 GM/DL — SIGNIFICANT CHANGE UP (ref 32–36)
MCV RBC AUTO: 91.3 FL — SIGNIFICANT CHANGE UP (ref 80–100)
PLATELET # BLD AUTO: 301 K/UL — SIGNIFICANT CHANGE UP (ref 150–400)
POTASSIUM SERPL-MCNC: 4.4 MMOL/L — SIGNIFICANT CHANGE UP (ref 3.5–5.3)
POTASSIUM SERPL-SCNC: 4.4 MMOL/L — SIGNIFICANT CHANGE UP (ref 3.5–5.3)
PROT SERPL-MCNC: 6.3 G/DL — LOW (ref 6.6–8.7)
RBC # BLD: 3.8 M/UL — LOW (ref 4.2–5.8)
RBC # FLD: 15.7 % — HIGH (ref 10.3–14.5)
SODIUM SERPL-SCNC: 139 MMOL/L — SIGNIFICANT CHANGE UP (ref 135–145)
WBC # BLD: 10.71 K/UL — HIGH (ref 3.8–10.5)
WBC # FLD AUTO: 10.71 K/UL — HIGH (ref 3.8–10.5)

## 2020-05-21 PROCEDURE — 99233 SBSQ HOSP IP/OBS HIGH 50: CPT

## 2020-05-21 RX ORDER — SODIUM CHLORIDE 9 MG/ML
1000 INJECTION INTRAMUSCULAR; INTRAVENOUS; SUBCUTANEOUS
Refills: 0 | Status: COMPLETED | OUTPATIENT
Start: 2020-05-21 | End: 2020-05-22

## 2020-05-21 RX ORDER — LACTOBACILLUS ACIDOPHILUS 100MM CELL
1 CAPSULE ORAL DAILY
Refills: 0 | Status: DISCONTINUED | OUTPATIENT
Start: 2020-05-21 | End: 2020-05-26

## 2020-05-21 RX ADMIN — CEFTRIAXONE 100 MILLIGRAM(S): 500 INJECTION, POWDER, FOR SOLUTION INTRAMUSCULAR; INTRAVENOUS at 21:12

## 2020-05-21 RX ADMIN — SODIUM CHLORIDE 100 MILLILITER(S): 9 INJECTION INTRAMUSCULAR; INTRAVENOUS; SUBCUTANEOUS at 13:59

## 2020-05-21 RX ADMIN — Medication 250 MILLIGRAM(S): at 05:29

## 2020-05-21 RX ADMIN — SODIUM CHLORIDE 100 MILLILITER(S): 9 INJECTION INTRAMUSCULAR; INTRAVENOUS; SUBCUTANEOUS at 21:14

## 2020-05-21 RX ADMIN — TAMSULOSIN HYDROCHLORIDE 0.4 MILLIGRAM(S): 0.4 CAPSULE ORAL at 21:12

## 2020-05-21 RX ADMIN — PANTOPRAZOLE SODIUM 40 MILLIGRAM(S): 20 TABLET, DELAYED RELEASE ORAL at 05:29

## 2020-05-21 RX ADMIN — Medication 250 MILLIGRAM(S): at 16:52

## 2020-05-21 RX ADMIN — FINASTERIDE 5 MILLIGRAM(S): 5 TABLET, FILM COATED ORAL at 13:09

## 2020-05-21 RX ADMIN — Medication 1 TABLET(S): at 13:09

## 2020-05-21 RX ADMIN — HEPARIN SODIUM 5000 UNIT(S): 5000 INJECTION INTRAVENOUS; SUBCUTANEOUS at 05:29

## 2020-05-21 RX ADMIN — HEPARIN SODIUM 5000 UNIT(S): 5000 INJECTION INTRAVENOUS; SUBCUTANEOUS at 16:53

## 2020-05-21 RX ADMIN — PANTOPRAZOLE SODIUM 40 MILLIGRAM(S): 20 TABLET, DELAYED RELEASE ORAL at 16:52

## 2020-05-21 NOTE — CONSULT NOTE ADULT - ASSESSMENT
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 Cholangiocarcinoma is known to metastasize to isabelle-pancreatic, perigastric and celiac lymph node basin causing extensive lymphadenopathy as described on imaging as visualized on ERCP. Patient not deemed a surgical candidate for advanced stage cancer.   - Medical oncology consult   - Ca19-9 levels  - rest of care per primary team  - Plan discussed with Attending, Dr. Gallardo

## 2020-05-21 NOTE — DISCHARGE NOTE PROVIDER - HOSPITAL COURSE
89 yo male with pmh of BPH, HTN, HLD, CKD3a, OA s/p L BORIS, distant scalp melanoma resection, was sent from pmd's office for further evaluation of painless jaundice. Noted significantly elevated lipase >3000, bili 9.5, . ctap with biliary duct dilatation due to mass effect from retroperitoneal lymphadenopathy. Findings compatible with either metastatic disease or lymphoma. No definite pancreatic mass identified. Admitted for further eval. MRCP complete, now s/p EUS/ERCP with noted nerotic duodenal ulcer, biopsies taken of heaped up margins, large lymphadenopathy around portal vein and pancreas, multiple LN around perigastric/celiac area (s/p FNA x4), distal bile duct stricture noted, stent placed within bile duct. Heme-Onc following.            All electrolyte abnormalities were monitored carefully and repleted as necessary during this hospitalization. At the time of discharge patient was hemodynamically stable and amenable to all terms of discharge. The patient has received verbal instructions from myself regarding discharge plans.         Length of Discharge: 45MIN        Vital Signs Last 24 Hrs    T(F): 98.1 (21 May 2020 16:15), Max: 98.1 (21 May 2020 16:15)    HR: 74 (21 May 2020 16:15) (57 - 74)    BP: 101/51 (21 May 2020 16:15) (91/46 - 104/63)    RR: 18 (21 May 2020 16:15) (18 - 18)    SpO2: 89% (21 May 2020 16:15) (89% - 98%)        General: NAD, resting comfortably in bed, jaundiced    	Head: NC/AT, circular left scalp melanoma resection scar noted    	Neck: supple, no cervical lymphadenopathy    	Pulm: CTA bilaterally, no crackles or wheezes    	Cardio: S1S2+, RRR, no murmurs    	GI: soft, BS+, ND,NT, no guarding or rigidity    	Vascular: no pitting edema, DP pulses 2+ b/l, no calf tenderness     Skin: warm and dry, visibly jaundiced (head, trunk, extremities) 88 yo male with pmh of BPH, HTN, HLD, CKD3a, OA s/p L BORIS, distant scalp melanoma resection, was sent from pmd's office for further evaluation of painless jaundice. Noted significantly elevated lipase >3000, bili 9.5, . ctap with biliary duct dilatation due to mass effect from retroperitoneal lymphadenopathy. Findings compatible with either metastatic disease or lymphoma. No definite pancreatic mass identified. Admitted for further eval. MRCP complete, now s/p EUS/ERCP with noted nerotic duodenal ulcer, biopsies taken of heaped up margins, large lymphadenopathy around portal vein and pancreas, multiple LN around perigastric/celiac area (s/p FNA x4), distal bile duct stricture noted, stent placed within bile duct. Heme-Onc following. 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 Palliative care consult placed. Patient to f/u outpatient with heme-onc in 1 week for biopsy results, treatment options.             All electrolyte abnormalities were monitored carefully and repleted as necessary during this hospitalization. At the time of discharge patient was hemodynamically stable and amenable to all terms of discharge. The patient has received verbal instructions from myself regarding discharge plans.         Length of Discharge: 45MIN        Vital Signs Last 24 Hrs    T(F): 98.1 (21 May 2020 16:15), Max: 98.1 (21 May 2020 16:15)    HR: 74 (21 May 2020 16:15) (57 - 74)    BP: 101/51 (21 May 2020 16:15) (91/46 - 104/63)    RR: 18 (21 May 2020 16:15) (18 - 18)    SpO2: 89% (21 May 2020 16:15) (89% - 98%)        General: NAD, resting comfortably in bed, jaundiced    	Head: NC/AT, circular left scalp melanoma resection scar noted    	Neck: supple, no cervical lymphadenopathy    	Pulm: CTA bilaterally, no crackles or wheezes    	Cardio: S1S2+, RRR, no murmurs    	GI: soft, BS+, ND,NT, no guarding or rigidity    	Vascular: no pitting edema, DP pulses 2+ b/l, no calf tenderness     Skin: warm and dry, visibly jaundiced (head, trunk, extremities) 89 yo male with pmh of BPH, HTN, HLD, CKD3a, OA s/p L BORIS, distant scalp melanoma resection, was sent from pmd's office for further evaluation of painless jaundice. Noted significantly elevated lipase >3000, bili 9.5, . ctap with biliary duct dilatation due to mass effect from retroperitoneal lymphadenopathy. Findings compatible with either metastatic disease or lymphoma. No definite pancreatic mass identified. Admitted for further eval. MRCP complete, now s/p EUS/ERCP with noted nerotic duodenal ulcer, biopsies taken of heaped up margins, large lymphadenopathy around portal vein and pancreas, multiple LN around perigastric/celiac area (s/p FNA x4), distal bile duct stricture noted, stent placed within bile duct. Heme-Onc following. 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 Palliative care consult placed. Patient to f/u outpatient with heme-onc in 1 week for biopsy results, treatment options.             All electrolyte abnormalities were monitored carefully and repleted as necessary during this hospitalization. At the time of discharge patient was hemodynamically stable . Pt is seen by physical therapy recommends [facility] for physical deconditioning.         BOTH PT AND WIFE AGREED WITH ABOVE PLAN.        Length of Discharge: 45MIN

## 2020-05-21 NOTE — DISCHARGE NOTE PROVIDER - CARE PROVIDERS DIRECT ADDRESSES
,DirectAddress_Unknown,[email or web address] ,DirectAddress_Unknown,[email or web address]

## 2020-05-21 NOTE — DISCHARGE NOTE PROVIDER - NSDCMRMEDTOKEN_GEN_ALL_CORE_FT
alfuzosin 10 mg oral tablet, extended release: 1 tab(s) orally once a day  aspirin 81 mg oral tablet: 1 tab(s) orally once a day  finasteride 5 mg oral tablet: 1 tab(s) orally once a day  KLOR-CON M20 TAB 20MEQ ER: 1  orally once a day  losartan 50 mg oral tablet: 1 tab(s) orally once a day  torsemide 20 mg oral tablet: 1 tab(s) orally once a day  Zetia 10 mg oral tablet: 1 tab(s) orally once a day acetaminophen 325 mg oral tablet: 2 tab(s) orally every 6 hours, As needed, Temp greater or equal to 38C (100.4F), Mild Pain (1 - 3)  aspirin 81 mg oral tablet: 1 tab(s) orally once a day  gabapentin 100 mg oral capsule: 1 cap(s) orally once a day (at bedtime)  heparin: 5000 unit(s) subcutaneous every 12 hours  losartan 50 mg oral tablet: 1 tab(s) orally once a day  HOLD IF SBP&lt;115  mirtazapine 7.5 mg oral tablet: 1 tab(s) orally once a day (at bedtime), As needed, insomnia  pantoprazole 40 mg oral delayed release tablet: 1 tab(s) orally 2 times a day  tamsulosin 0.4 mg oral capsule: 1 cap(s) orally once a day (at bedtime)  Zetia 10 mg oral tablet: 1 tab(s) orally once a day

## 2020-05-21 NOTE — CONSULT NOTE ADULT - SUBJECTIVE AND OBJECTIVE BOX
Surgical Oncology Consult Note     Patient is a 89y old  Male who presents with a chief complaint of obstructive jaundice with concern for malignancy (21 May 2020 17:41)      HPI: 89 year old male with PMH significant for BPH, HLD, HTN, Melanoma of scalp s/p excision and CKD was admitted to [facility] on  with obstructive juandice secondary to biliary stricture. Patient is hard of hearing and difficult to ascertain much information but he notes a 30 lbs unintentional weight loss during a 3 month period associated with poor appetite which he supplements with Ensure drinks. Patient presented with a TBili of 9.5, elevated Lipase >3000 and transaminitis. Further workup including Abd US, MRCP and CT abd showed signs of intrahepatic biliary dilation while MRCP specifically revealed narrowing of CBD and pancreatic duct at the pancreatic head with no discrete mass identified. GI did ERCP on , finding significant for necrotic duodenal ulcer, lymphadenopathy around the portal vein and pancreas as well as along the perigastric/celiac regions. Also notable was proximal ductal dilation/ GI placed a bare metal bilary stent and preformed a sphincterotomy. Patient's labs noted to be improving post ERCP.    Patient denies fevers/chills, denies lightheadedness/dizziness, denies SOB/chest pain, denies nausea/vomiting, denies constipation/diarrhea.     ROS: 10-system review is otherwise negative except HPI above.      PAST MEDICAL & SURGICAL HISTORY:  BPH (benign prostatic hyperplasia)  HLD (hyperlipidemia)  HTN (hypertension)  [place] (hard of hearing)  OA (osteoarthritis)  Melanoma  H/O hernia repair  H/O melanoma excision: scalp  S/P cataract surgery    FAMILY HISTORY:  No pertinent family history in first degree relatives    [] Family history not pertinent as reviewed with the patient and family      ALLERGIES: No Known Allergies      CURRENT MEDICATIONS  MEDICATIONS (STANDING): cefTRIAXone   IVPB 1000 milliGRAM(s) IV Intermittent every 24 hours  dextrose 5%. 1000 milliLiter(s) IV Continuous <Continuous>  dextrose 50% Injectable 12.5 Gram(s) IV Push once  dextrose 50% Injectable 25 Gram(s) IV Push once  dextrose 50% Injectable 25 Gram(s) IV Push once  finasteride 5 milliGRAM(s) Oral daily  heparin   Injectable 5000 Unit(s) SubCutaneous every 12 hours  indomethacin Suppository 100 milliGRAM(s) Rectal once  insulin lispro (HumaLOG) corrective regimen sliding scale   SubCutaneous three times a day before meals  insulin lispro (HumaLOG) corrective regimen sliding scale   SubCutaneous at bedtime  lactobacillus acidophilus 1 Tablet(s) Oral daily  pantoprazole    Tablet 40 milliGRAM(s) Oral two times a day  saccharomyces boulardii 250 milliGRAM(s) Oral two times a day  sodium chloride 0.9%. 1000 milliLiter(s) IV Continuous <Continuous>  tamsulosin 0.4 milliGRAM(s) Oral at bedtime    MEDICATIONS (PRN):acetaminophen   Tablet .. 650 milliGRAM(s) Oral every 6 hours PRN Temp greater or equal to 38C (100.4F), Mild Pain (1 - 3)  dextrose 40% Gel 15 Gram(s) Oral once PRN Blood Glucose LESS THAN 70 milliGRAM(s)/deciliter  glucagon  Injectable 1 milliGRAM(s) IntraMuscular once PRN Glucose LESS THAN 70 milligrams/deciliter    --------------------------------------------------------------------------------------------    Vitals:   T(C): 36.7 (20 @ 16:15), Max: 36.7 (20 @ 16:15)  HR: 74 (20 @ 16:15) (57 - 74)  BP: 101/51 (20 @ 16:15) (91/46 - 104/63)  RR: 18 (20 @ 16:15) (18 - 18)  SpO2: 89% (20 @ 16:15) (89% - 98%)  CAPILLARY BLOOD GLUCOSE      POCT Blood Glucose.: 124 mg/dL (21 May 2020 16:40)  POCT Blood Glucose.: 102 mg/dL (21 May 2020 11:25)  POCT Blood Glucose.: 95 mg/dL (21 May 2020 07:57)  POCT Blood Glucose.: 159 mg/dL (20 May 2020 21:55)    CAPILLARY BLOOD GLUCOSE      POCT Blood Glucose.: 124 mg/dL (21 May 2020 16:40)  POCT Blood Glucose.: 102 mg/dL (21 May 2020 11:25)  POCT Blood Glucose.: 95 mg/dL (21 May 2020 07:57)  POCT Blood Glucose.: 159 mg/dL (20 May 2020 21:55)       @ 07:01  -   @ 18:41  --------------------------------------------------------  IN:  Total IN: 0 mL    OUT:    Voided: 250 mL  Total OUT: 250 mL    Total NET: -250 mL        Height (cm): 187.96 ( 13:17)  Weight (kg): 88.5 (:17)  BMI (kg/m2): 25.1 ( 13:17)  BSA (m2): 2.15 (:17)    PHYSICAL EXAM:   General: NAD, Lying in bed comfortably, jaundice  Neuro: A+Ox3,   HEENT: NC/AT, EOM, scleral icterus hard of hearing, hearing aid to right ear, left temporal 5.5x5.5cm well healed skin graft scar from previous melanoma excision  Neck: Soft, supple  Cardio: RRR, nml S1/S2  Resp: Good effort, CTA b/l  Thorax: No chest wall tenderness  GI/Abd: Soft, NT/ND, no rebound/guarding, no masses palpated, right inguinal well-healed scar  Vascular: All 4 extremities warm.  Skin: Intact, no breakdown  Lymphatic/Nodes: No palpable lymphadenopathy  Musculoskeletal: All 4 extremities moving spontaneously, no limitations  --------------------------------------------------------------------------------------------    LABS  CBC ( @ 07:58)                              11.1<L>                         10.71<H>  )----------------(  301        --    % Neutrophils, --    % Lymphocytes, ANC: --                                  34.7<L>  CBC ( @ 12:20)                              11.7<L>                         20.20<H>  )----------------(  217        90.2<H>% Neutrophils, 3.3<L>% Lymphocytes, ANC: 18.20<H>                              36.3<L>    BMP ( @ 07:58)             139     |  102     |  60.0<H>		Ca++ --      Ca 9.1                ---------------------------------( 100<H>		Mg --                 4.4     |  23.0    |  1.91<H>			Ph --      BMP ( @ 12:20)             138     |  105     |  46.0<H>		Ca++ --      Ca 9.0                ---------------------------------( 107<H>		Mg --                 4.4     |  19.0<L>  |  1.05  			Ph --        LFTs ( @ 07:58)      TPro 6.3<L> / Alb 2.9<L> / TBili 8.6<H> / DBili -- / AST 51<H> / ALT 75<H> / AlkPhos 454<H>            --------------------------------------------------------------------------------------------    MICROBIOLOGY  Urinalysis ( @ 18:49):     Color: Yellow / Appearance: Slightly Turbid<!> / S.015 / pH: 5.0 / Gluc: Negative / Ketones: Negative / Bili: Moderate<!> / Urobili: 1<!> / Protein :30<!> / Nitrites: Negative / Leuk.Est: Moderate<!> / RBC: 0-2 / WBC: >50<!> / Sq Epi:  / Non Sq Epi: Moderate<!> / Bacteria Moderate<!>       -> .Urine Clean Catch (Midstream) Culture ( @ 01:54)     NG    NG    <10,000 CFU/mL Normal Urogenital Nicole    -> .Blood Blood-Peripheral Culture ( 13:46)     NG    NG    No growth at 48 hours      --------------------------------------------------------------------------------------------    IMAGING  < from: MR MRCP No Cont (20 @ 22:38) >  IMPRESSION:   Moderate biliary ductal dilatation with narrowing of thecommon bile duct and pancreatic duct in the pancreatic head. No discrete pancreatic head mass is identified.    Findings could be due to occult pancreatic head mass versus obstruction from bulky periportal and peripancreatic lymphadenopathy.    < end of copied text >  < from: CT Abdomen and Pelvis No Cont (20 @ 19:56) >  IMPRESSION:     Moderate biliary ductal dilatation due tomass effect from periportal lymphadenopathy.    Additional bulky retroperitoneal lymphadenopathy. Findings are compatible with either metastatic disease or lymphoma.      Limited evaluation of the pancreas. No definite pancreatic mass identified. Suggest MRI for better evaluation.    Bibasilar dependent airspace opacities could represent atelectasis or pneumonia.    < end of copied text >    < from: US Abdomen Complete (20 @ 19:17) >  IMPRESSION:     Intrahepatic and CBD dilatation. Numerous gallstones. Pancreas is not visualizedsecondary to overlying bowel gas. Further evaluation with CT abdomen/pelvis is recommended.    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    Several cysts in the kidneys as above.    < end of copied text >

## 2020-05-21 NOTE — PROGRESS NOTE ADULT - SUBJECTIVE AND OBJECTIVE BOX
Patient is a 89y old  Male who presents with a chief complaint of obstructive jaundice with concern for malignancy (21 May 2020 07:56)    INTERVAL/OVERNIGHT EVENTS:  Patient examined at beside. No acute events over night. Patient is s/p EUS/ERCP yesterday to evaluate biliary ductal dilation of CBD, ?pancreatic mass, patient is stable post procedure. Per GI large duodenal necrotic ulcer with heaped up margins found, +large lymphadenopathy causing obstruction, stent placed. Patient states itching has improved, denies any SOB, chest pain, abdominal pain, n/v, diarrhea. Patient is tolerating PO diet, ambulating minimally.     I&O's Summary  21 May 2020 07:01  -  21 May 2020 16:07  --------------------------------------------------------  IN: 0 mL / OUT: 250 mL / NET: -250 mL    CAPILLARY BLOOD GLUCOSE  POCT Blood Glucose.: 102 mg/dL (21 May 2020 11:25)  POCT Blood Glucose.: 95 mg/dL (21 May 2020 07:57)  POCT Blood Glucose.: 159 mg/dL (20 May 2020 21:55)  POCT Blood Glucose.: 105 mg/dL (20 May 2020 17:11)    V/S:  T(F): 97.9 (21 May 2020 07:22), Max: 97.9 (21 May 2020 07:22)  HR: 59 (21 May 2020 13:01) (57 - 72)  BP: 97/42 (21 May 2020 13:01) (91/46 - 104/63)  RR: 18 (21 May 2020 07:22) (18 - 18)  SpO2: 98% (21 May 2020 07:22) (97% - 98%)    General: NAD, resting comfortably in bed, jaundiced  	Head: NC/AT, circular left scalp melanoma resection scar noted  	Neck: supple, no cervical lymphadenopathy  	Pulm: CTA bilaterally, no crackles or wheezes  	Cardio: S1S2+, RRR, no murmurs  	GI: soft, BS+, ND,NT, no guarding or rigidity  	Vascular: no pitting edema, DP pulses 2+ b/l, no calf tenderness   Skin: warm and dry, visibly jaundiced (head, trunk, extremities)    LABS                          11.1   10.71 )-----------( 301      ( 21 May 2020 07:58 )             34.7         05-21    139  |  102  |  60.0<H>  ----------------------------<  100<H>  4.4   |  23.0  |  1.91<H>    Ca    9.1      21 May 2020 07:58    TPro  6.3<L>  /  Alb  2.9<L>  /  TBili  8.6<H>  /  DBili  x   /  AST  51<H>  /  ALT  75<H>  /  AlkPhos  454<H>  05-21    LIVER FUNCTIONS - ( 21 May 2020 07:58 )  Alb: 2.9 g/dL / Pro: 6.3 g/dL / ALK PHOS: 454 U/L / ALT: 75 U/L / AST: 51 U/L / GGT: x           Culture - Urine (collected 19 May 2020 01:54)  Source: .Urine Clean Catch (Midstream)  Final Report (19 May 2020 22:18):    <10,000 CFU/mL Normal Urogenital Nicole    IMAGING    DIET: mechanical soft, DASH/TLC    MEDICATIONS  (STANDING):  cefTRIAXone   IVPB 1000 milliGRAM(s) IV Intermittent every 24 hours  dextrose 5%. 1000 milliLiter(s) (50 mL/Hr) IV Continuous <Continuous>  dextrose 50% Injectable 12.5 Gram(s) IV Push once  dextrose 50% Injectable 25 Gram(s) IV Push once  dextrose 50% Injectable 25 Gram(s) IV Push once  finasteride 5 milliGRAM(s) Oral daily  heparin   Injectable 5000 Unit(s) SubCutaneous every 12 hours  indomethacin Suppository 100 milliGRAM(s) Rectal once  insulin lispro (HumaLOG) corrective regimen sliding scale   SubCutaneous three times a day before meals  insulin lispro (HumaLOG) corrective regimen sliding scale   SubCutaneous at bedtime  lactobacillus acidophilus 1 Tablet(s) Oral daily  pantoprazole    Tablet 40 milliGRAM(s) Oral two times a day  saccharomyces boulardii 250 milliGRAM(s) Oral two times a day  sodium chloride 0.9%. 1000 milliLiter(s) (100 mL/Hr) IV Continuous <Continuous>  tamsulosin 0.4 milliGRAM(s) Oral at bedtime    MEDICATIONS  (PRN):  acetaminophen   Tablet .. 650 milliGRAM(s) Oral every 6 hours PRN Temp greater or equal to 38C (100.4F), Mild Pain (1 - 3)  dextrose 40% Gel 15 Gram(s) Oral once PRN Blood Glucose LESS THAN 70 milliGRAM(s)/deciliter  glucagon  Injectable 1 milliGRAM(s) IntraMuscular once PRN Glucose LESS THAN 70 milligrams/deciliter

## 2020-05-21 NOTE — PROGRESS NOTE ADULT - SUBJECTIVE AND OBJECTIVE BOX
INTERVAL HPI/OVERNIGHT EVENTS:FU after EGD/EUS/ERCP. S/p EGD which showed duodenal ulcer. Peripancreatic and perigastric lymph nodes. Bile duct stent placed for bile duct stricture. No complaints. Seen by oncology.     MEDICATIONS  (STANDING):  cefTRIAXone   IVPB 1000 milliGRAM(s) IV Intermittent every 24 hours  dextrose 5%. 1000 milliLiter(s) (50 mL/Hr) IV Continuous <Continuous>  dextrose 50% Injectable 12.5 Gram(s) IV Push once  dextrose 50% Injectable 25 Gram(s) IV Push once  dextrose 50% Injectable 25 Gram(s) IV Push once  ertapenem  IVPB 1000 milliGRAM(s) IV Intermittent once  finasteride 5 milliGRAM(s) Oral daily  heparin   Injectable 5000 Unit(s) SubCutaneous every 12 hours  hydrALAZINE Injectable 5 milliGRAM(s) IV Push every 6 hours  indomethacin Suppository 100 milliGRAM(s) Rectal once  insulin lispro (HumaLOG) corrective regimen sliding scale   SubCutaneous three times a day before meals  insulin lispro (HumaLOG) corrective regimen sliding scale   SubCutaneous at bedtime  pantoprazole    Tablet 40 milliGRAM(s) Oral two times a day  saccharomyces boulardii 250 milliGRAM(s) Oral two times a day  tamsulosin 0.4 milliGRAM(s) Oral at bedtime    MEDICATIONS  (PRN):  acetaminophen   Tablet .. 650 milliGRAM(s) Oral every 6 hours PRN Temp greater or equal to 38C (100.4F), Mild Pain (1 - 3)  dextrose 40% Gel 15 Gram(s) Oral once PRN Blood Glucose LESS THAN 70 milliGRAM(s)/deciliter  glucagon  Injectable 1 milliGRAM(s) IntraMuscular once PRN Glucose LESS THAN 70 milligrams/deciliter      Allergies    No Known Allergies    Intolerances        Vital Signs Last 24 Hrs  T(C): 36.4 (21 May 2020 05:26), Max: 37.1 (20 May 2020 12:19)  T(F): 97.5 (21 May 2020 05:26), Max: 98.8 (20 May 2020 12:19)  HR: 70 (21 May 2020 05:26) (61 - 86)  BP: 103/62 (21 May 2020 05:26) (101/62 - 107/57)  BP(mean): --  RR: 18 (21 May 2020 05:26) (18 - 20)  SpO2: 98% (21 May 2020 05:26) (95% - 98%)    LABS:                        11.7   20.20 )-----------( 217      ( 20 May 2020 12:20 )             36.3     05-20    138  |  105  |  46.0<H>  ----------------------------<  107<H>  4.4   |  19.0<L>  |  1.05    Ca    9.0      20 May 2020 12:20    TPro  6.0<L>  /  Alb  3.0<L>  /  TBili  9.0<H>  /  DBili  x   /  AST  61<H>  /  ALT  99<H>  /  AlkPhos  482<H>  05-19          RADIOLOGY & ADDITIONAL TESTS:

## 2020-05-21 NOTE — PROGRESS NOTE ADULT - ASSESSMENT
89 yo male with pmh of BPH, HTN, HLD, CKD3a, OA s/p L BORIS, distant scalp melanoma resection, was sent from pmd's office for further evaluation of painless jaundice. Noted significantly elevated lipase >3000, bili 9.5, . ctap with biliary duct dilatation due to mass effect from retroperitoneal lymphadenopathy. Findings compatible with either metastatic disease or lymphoma. No definite pancreatic mass identified. Admitted for further eval. MRCP complete, now s/p EUS/ERCP with noted nerotic duodenal ulcer, biopsies taken of heaped up margins, large lymphadenopathy around portal vein and pancreas, multiple LN around perigastric/celiac area (s/p FNA x4), distal bile duct stricture noted, stent placed within bile duct. Heme-Onc following.    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 No discrete head mass identified.   -numerous gall stones on sonogram, however no clinical signs of biliary colic  -Lipase >3000, Bili 9.5, but no clinical signs of pancreatitis  - lipase now improved to 1781, bilirubin 8.6, LFTs downtrending  - ursodiol QD for pruritis  - tumor markers CEA, CA 19-9 elevated  - Monitor LFTs  - heme-onc eval noted  - c/w PPI BID   - Follow up biopsy results  - dc aspirin given necrotic ulcer    ABIGAIL on CKD3a  -creatinine 1.91 today  -Cr 1.78 on arrival, worse from baseline ~1.4  -likely from dehydration  - c/w IVF hydration 100cc/hr x 24 hrs  - f/u am labs  - avoid nephrotoxic meds    UTI  - grossly positive UA and +urinary freq  - continue tx with rocephin day 4/7  - urine culture with <[id number] normal urogenital sakshi.    HTN  -hypotensive on arrival, improved s/p ivf but still soft  - hold home dose losartan since BPs low  - prn hydralazine for SBP>160    Elevated troponin  -no active anginal symptoms, ekg without acute ischemic changes  -trend cardiac enzymes  -likely secondary to renal failure    Normocytic anemia  -Hb at baseline ~10  -no active bleeding  -follow up o/p with pmd for anemia work up    HLD  - pt take ezetimibe at home. unavailable at hospital.   - resume on discharge    BPH  -chronic, stable   -c/w proscar and flomax    DVT ppx:   heparin 5000 units SQ BID    Dispo: dc to home tomorrow. f/u heme onc outpatient in 1 week for biopsy results  Spoke to wife and daughter (Sangeetha) about EUS findings, biopsies taken, updated about current status and plan of care. All questions answered.

## 2020-05-21 NOTE — DISCHARGE NOTE PROVIDER - CARE PROVIDER_API CALL
Conor Bradley  INTERNAL MEDICINE  [address] [place] [address]  Phone: [phone]  Fax: [phone]  Established Patient  Follow Up Time: 1 week    Dandre Dutta  GASTROENTEROLOGY  [address] [place] [address]  Phone: [phone]  Fax: [phone]  Established Patient  Follow Up Time: 2 weeks Conor Bradley  INTERNAL MEDICINE  [address] [place] [address]  Phone: [phone]  Fax: [phone]  Established Patient  Follow Up Time: 1 week    Dandre Dutta  GASTROENTEROLOGY  [address] [place] [address]  Phone: [phone]  Fax: [phone]  Established Patient  Follow Up Time: 2 weeks    John Falcon  HEMATOLOGY  [address] [place] [address]  Phone: [phone]  Fax: [phone]  Established Patient  Follow Up Time: 1 week

## 2020-05-21 NOTE — DISCHARGE NOTE PROVIDER - PROVIDER TOKENS
PROVIDER:[[id number]],FOLLOWUP:[1 week],ESTABLISHEDPATIENT:[T]],PROVIDER:[TOKEN:[[id number]],FOLLOWUP:[2 weeks],ESTABLISHEDPATIENT:[T]] PROVIDER:[TOKEN:[[id number]],FOLLOWUP:[1 week],ESTABLISHEDPATIENT:[T]],PROVIDER:[TOKEN:[[id number]],FOLLOWUP:[2 weeks],ESTABLISHEDPATIENT:[T]],PROVIDER:[TOKEN:[[id number]],FOLLOWUP:[1 week],ESTABLISHEDPATIENT:[T]]

## 2020-05-21 NOTE — DISCHARGE NOTE PROVIDER - NSDCCPCAREPLAN_GEN_ALL_CORE_FT
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  Assessment and Plan of Treatment:       SECONDARY DISCHARGE DIAGNOSES  Diagnosis: Lymphadenopathy, abdominal  Assessment and Plan of Treatment:     Diagnosis: Acute UTI  Assessment and Plan of Treatment:     Diagnosis: Acute renal failure superimposed on chronic kidney disease  Assessment and Plan of Treatment:     Diagnosis: Hypertension  Assessment and Plan of Treatment:     Diagnosis: Normocytic anemia  Assessment and Plan of Treatment:     Diagnosis: HLD (hyperlipidemia)  Assessment and Plan of Treatment:     Diagnosis: BPH (benign prostatic hyperplasia)  Assessment and Plan of Treatment: 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 There was also evidence of a malignant neoplasm during evaluation. Several biopsies were taken. Please follow up with the hematologist-oncologist within 1 week after discharge to discuss results and treatment options.      SECONDARY DISCHARGE DIAGNOSES  Diagnosis: Lymphadenopathy, abdominal  Assessment and Plan of Treatment: You were found to have enlarged lymph nodes around the stomach and pancreas. These enlarged lymph nodes likely caused the obstruction of your common bile duct. Unfortunately this is also likely due to an underlying malignant neoplasm. Biopsies were taken of the lymph nodes as well. Please follow up with the hematologist-oncologist within 1 week after discharge to discuss results and treatment options.    Diagnosis: Acute UTI  Assessment and Plan of Treatment: You were found to have a urinary tract infection, that was treated with IV antibiotics. Please follow up with your primary care doctor after discharge.    Diagnosis: Acute renal failure superimposed on chronic kidney disease  Assessment and Plan of Treatment: You had elevations in your kidney numbers likely due to dehydration. You were treated with IV fluids and certain medications were held to allow your kidneys time to recover. Your creatinine was monitored carefully. Please follow up with your primary care doctor to monitor these levels.    Diagnosis: Hypertension  Assessment and Plan of Treatment: Certain medications were held due to your acute kidney injury. Please follow up with your primary care doctor to know when to restart these medications.    Diagnosis: Normocytic anemia  Assessment and Plan of Treatment: Follow up with your primary care doctor to monitor your blood levels.    Diagnosis: HLD (hyperlipidemia)  Assessment and Plan of Treatment: Continue taking all medications as prescribed. Follow up with your primary care doctor after discharge.    Diagnosis: BPH (benign prostatic hyperplasia)  Assessment and Plan of Treatment: Continue taking all medications as prescribed. Follow up with your primary care doctor after discharge. 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 There was also evidence of a malignant neoplasm during evaluation. Several biopsies were taken. Please follow up with the hematologist-oncologist within 1 week after discharge to discuss results and treatment options.      SECONDARY DISCHARGE DIAGNOSES  Diagnosis: Physical deconditioning  Assessment and Plan of Treatment:     Diagnosis: HLD (hyperlipidemia)  Assessment and Plan of Treatment: Continue taking all medications as prescribed. Follow up with your primary care doctor after discharge.    Diagnosis: BPH (benign prostatic hyperplasia)  Assessment and Plan of Treatment: Continue taking all medications as prescribed. Follow up with your primary care doctor after discharge.    Diagnosis: Normocytic anemia  Assessment and Plan of Treatment: Follow up with your primary care doctor to monitor your blood levels.    Diagnosis: Hypertension  Assessment and Plan of Treatment: Certain medications were held due to your acute kidney injury. Please follow up with your primary care doctor to know when to restart these medications.    Diagnosis: Acute UTI  Assessment and Plan of Treatment: You were found to have a urinary tract infection, that was treated with IV antibiotics. Please follow up with your primary care doctor after discharge.    Diagnosis: Acute renal failure superimposed on chronic kidney disease  Assessment and Plan of Treatment: You had elevations in your kidney numbers likely due to dehydration. You were treated with IV fluids and certain medications were held to allow your kidneys time to recover. Your creatinine was monitored carefully. Please follow up with your primary care doctor to monitor these levels.    Diagnosis: Lymphadenopathy, abdominal  Assessment and Plan of Treatment: You were found to have enlarged lymph nodes around the stomach and pancreas. These enlarged lymph nodes likely caused the obstruction of your common bile duct. Unfortunately this is also likely due to an underlying malignant neoplasm. Biopsies were taken of the lymph nodes as well. Please follow up with the hematologist-oncologist within 1 week after discharge to discuss results and treatment options.

## 2020-05-21 NOTE — PROGRESS NOTE ADULT - ASSESSMENT
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 Doing well. Await pathology. Hold aspirin if possible. PPI bid. Monitor LFTs  Spoke to wife after the procedure yesterday.

## 2020-05-21 NOTE — DISCHARGE NOTE PROVIDER - INSTRUCTIONS
mechanical soft  The DASH diet recommendation includes: Whole grains, Vegetables, Fruits, Low-fat or fat-free milk and milk products, Lean meat, poultry, and fish, Nuts, seeds, and beans, Healthy fats and oils, Sweets, preferably low-fat or fat-free,Sodium (no more than 2,300 mg a day). If you drink alcohol, limit yourself to 2 drinks or less per day for men. To reduce your blood pressure even more, replace some DASH diet carbohydrates with low-fat protein and unsaturated fats. Ways to cut back on salt:Limit canned, dried, packaged, and fast foods.  Don't add salt to your food at the table. Season foods with herbs instead of salt when you cook. Request no added salt when you go to a restaurant.

## 2020-05-21 NOTE — PROGRESS NOTE ADULT - ATTENDING COMMENTS
Patient seen and examined and discussed with resident team.  Labs showed ABIGAIL this AM, likely prerenal.  IVFs started.   Path is pending.   Likely D/C in next 1-2 days pending repeat labs.

## 2020-05-22 LAB
ALBUMIN SERPL ELPH-MCNC: 2.5 G/DL — LOW (ref 3.3–5.2)
ALP SERPL-CCNC: 393 U/L — HIGH (ref 40–120)
ALT FLD-CCNC: 55 U/L — HIGH
ANION GAP SERPL CALC-SCNC: 13 MMOL/L — SIGNIFICANT CHANGE UP (ref 5–17)
AST SERPL-CCNC: 41 U/L — HIGH
BILIRUB DIRECT SERPL-MCNC: 5.5 MG/DL — HIGH (ref 0–0.3)
BILIRUB INDIRECT FLD-MCNC: 0.9 MG/DL — SIGNIFICANT CHANGE UP (ref 0.2–1)
BILIRUB SERPL-MCNC: 6.4 MG/DL — HIGH (ref 0.4–2)
BUN SERPL-MCNC: 63 MG/DL — HIGH (ref 8–20)
CALCIUM SERPL-MCNC: 8.6 MG/DL — SIGNIFICANT CHANGE UP (ref 8.6–10.2)
CHLORIDE SERPL-SCNC: 104 MMOL/L — SIGNIFICANT CHANGE UP (ref 98–107)
CO2 SERPL-SCNC: 21 MMOL/L — LOW (ref 22–29)
CREAT SERPL-MCNC: 1.93 MG/DL — HIGH (ref 0.5–1.3)
GLUCOSE BLDC GLUCOMTR-MCNC: 106 MG/DL — HIGH (ref 70–99)
GLUCOSE BLDC GLUCOMTR-MCNC: 143 MG/DL — HIGH (ref 70–99)
GLUCOSE BLDC GLUCOMTR-MCNC: 157 MG/DL — HIGH (ref 70–99)
GLUCOSE BLDC GLUCOMTR-MCNC: 93 MG/DL — SIGNIFICANT CHANGE UP (ref 70–99)
GLUCOSE SERPL-MCNC: 103 MG/DL — HIGH (ref 70–99)
HCT VFR BLD CALC: 31 % — LOW (ref 39–50)
HGB BLD-MCNC: 10.2 G/DL — LOW (ref 13–17)
MCHC RBC-ENTMCNC: 29.1 PG — SIGNIFICANT CHANGE UP (ref 27–34)
MCHC RBC-ENTMCNC: 32.9 GM/DL — SIGNIFICANT CHANGE UP (ref 32–36)
MCV RBC AUTO: 88.6 FL — SIGNIFICANT CHANGE UP (ref 80–100)
NON-GYNECOLOGICAL CYTOLOGY STUDY: SIGNIFICANT CHANGE UP
PLATELET # BLD AUTO: 298 K/UL — SIGNIFICANT CHANGE UP (ref 150–400)
POTASSIUM SERPL-MCNC: 4.4 MMOL/L — SIGNIFICANT CHANGE UP (ref 3.5–5.3)
POTASSIUM SERPL-SCNC: 4.4 MMOL/L — SIGNIFICANT CHANGE UP (ref 3.5–5.3)
PROT SERPL-MCNC: 5.8 G/DL — LOW (ref 6.6–8.7)
RBC # BLD: 3.5 M/UL — LOW (ref 4.2–5.8)
RBC # FLD: 15.9 % — HIGH (ref 10.3–14.5)
SODIUM SERPL-SCNC: 138 MMOL/L — SIGNIFICANT CHANGE UP (ref 135–145)
WBC # BLD: 13.35 K/UL — HIGH (ref 3.8–10.5)
WBC # FLD AUTO: 13.35 K/UL — HIGH (ref 3.8–10.5)

## 2020-05-22 PROCEDURE — 99223 1ST HOSP IP/OBS HIGH 75: CPT | Mod: 25

## 2020-05-22 PROCEDURE — 99233 SBSQ HOSP IP/OBS HIGH 50: CPT

## 2020-05-22 RX ORDER — LANOLIN ALCOHOL/MO/W.PET/CERES
3 CREAM (GRAM) TOPICAL AT BEDTIME
Refills: 0 | Status: DISCONTINUED | OUTPATIENT
Start: 2020-05-22 | End: 2020-05-26

## 2020-05-22 RX ORDER — SODIUM CHLORIDE 9 MG/ML
1000 INJECTION INTRAMUSCULAR; INTRAVENOUS; SUBCUTANEOUS
Refills: 0 | Status: DISCONTINUED | OUTPATIENT
Start: 2020-05-22 | End: 2020-05-26

## 2020-05-22 RX ORDER — GABAPENTIN 400 MG/1
100 CAPSULE ORAL AT BEDTIME
Refills: 0 | Status: DISCONTINUED | OUTPATIENT
Start: 2020-05-22 | End: 2020-05-26

## 2020-05-22 RX ORDER — MIRTAZAPINE 45 MG/1
7.5 TABLET, ORALLY DISINTEGRATING ORAL AT BEDTIME
Refills: 0 | Status: DISCONTINUED | OUTPATIENT
Start: 2020-05-22 | End: 2020-05-26

## 2020-05-22 RX ADMIN — HEPARIN SODIUM 5000 UNIT(S): 5000 INJECTION INTRAVENOUS; SUBCUTANEOUS at 17:03

## 2020-05-22 RX ADMIN — PANTOPRAZOLE SODIUM 40 MILLIGRAM(S): 20 TABLET, DELAYED RELEASE ORAL at 05:42

## 2020-05-22 RX ADMIN — Medication 1 TABLET(S): at 12:06

## 2020-05-22 RX ADMIN — TAMSULOSIN HYDROCHLORIDE 0.4 MILLIGRAM(S): 0.4 CAPSULE ORAL at 21:02

## 2020-05-22 RX ADMIN — Medication 3 MILLIGRAM(S): at 21:03

## 2020-05-22 RX ADMIN — PANTOPRAZOLE SODIUM 40 MILLIGRAM(S): 20 TABLET, DELAYED RELEASE ORAL at 17:03

## 2020-05-22 RX ADMIN — HEPARIN SODIUM 5000 UNIT(S): 5000 INJECTION INTRAVENOUS; SUBCUTANEOUS at 05:41

## 2020-05-22 RX ADMIN — SODIUM CHLORIDE 100 MILLILITER(S): 9 INJECTION INTRAMUSCULAR; INTRAVENOUS; SUBCUTANEOUS at 12:08

## 2020-05-22 RX ADMIN — Medication 250 MILLIGRAM(S): at 05:42

## 2020-05-22 RX ADMIN — FINASTERIDE 5 MILLIGRAM(S): 5 TABLET, FILM COATED ORAL at 12:07

## 2020-05-22 RX ADMIN — Medication 250 MILLIGRAM(S): at 17:03

## 2020-05-22 RX ADMIN — GABAPENTIN 100 MILLIGRAM(S): 400 CAPSULE ORAL at 21:02

## 2020-05-22 NOTE — CONSULT NOTE ADULT - SUBJECTIVE AND OBJECTIVE BOX
HPI: This is an 88 yo M PMH of BPH, HTN, HLD, CKD3a OA S/P BORIS with distant scalp melanoma resection. Patient was sent for PMD office to evaluate and treat Jaundice.  His appetite much less, weight loss. He has had urinary frequency past two weeks; but denies dysuria, urgency. No fever, SOB N/V/D constipation last BM last night + gas pains  and L sided Abdominal "discomfort" which he has been living with past few months. Also experiencing dizziness and fell at home due to  episode of vertigo.  Mechanical soft diet lunch tray in front of patient, untouched-he didn't eat his breakfast "too distasteful", he agrees to drink Ensure.  Patient is S/P ERCP had sphincterotomy and Biliary Stent placement  2 days ago.     88 yo male with pmh of BPH, HTN, HLD, CKD3a, OA s/p L BORIS, distant scalp melanoma resection, was sent from pmd's office for further evaluation of painless jaundice. Reports low appetite and unintentional weight loss approx 20 lbs in last 2 months. No family hx of cancer. No hx of EtOH abuse. No prior incidents of jaundice. No hx of pancreatitis or biliary colic. Distant colonoscopy about 10 yrs ago was negative. Also reports urinary frequency for the past 2 weeks. Denies dysuria or urgency. No flank pain, fevers, chills, n/v, abd pain, or bleeding from anywhere.     ED course: Noted to be hypotensive on arrival with BP 83/36, improved to 113/53 s/p 1L NS. Noted significantly elevated lipase >3000, bili 9.5, . ctap with biliary duct dilatation due to mass effect from retroperitoneal lymphadenopathy. Findings compatible with either metastatic disease or lymphoma. Limited eval of pancreas. No definite pancreatic mass identified. MRCP (no contrast due to low GFR) for further eval, and prelim report consistent with ct scan findings. (18 May 2020 23:14)    PERTINENT PMH REVIEWED: Yes     PAST MEDICAL & SURGICAL HISTORY:  BPH (benign prostatic hyperplasia)  HLD (hyperlipidemia)  HTN (hypertension)  [place] (hard of hearing)  OA (osteoarthritis)  Melanoma  H/O hernia repair  H/O melanoma excision: scalp  S/P cataract surgery      SOCIAL HISTORY:  EtOH    No                                    Drugs   No                                    nonsmoker                                    Admitted from: home PMD office visit:  Wife Kaylee Dudley [phone]     FAMILY HISTORY:  No pertinent family history in first degree relatives    No Known Allergies    Baseline ADLs (prior to admission):  Independent/     Present Symptoms:     Dyspnea: 0   Nausea/Vomiting: No  Anxiety:  No  Depression: No  Fatigue: Yes   Loss of appetite: Yes     Pain: vague L sided abdominal pain            Character-            Duration-            Effect-            Factors-            Frequency-            Location-            Severity-mild?    Review of Systems: Reviewed                       All others negative    MEDICATIONS  (STANDING):  cefTRIAXone   IVPB 1000 milliGRAM(s) IV Intermittent every 24 hours  dextrose 5%. 1000 milliLiter(s) (50 mL/Hr) IV Continuous <Continuous>  dextrose 50% Injectable 12.5 Gram(s) IV Push once  dextrose 50% Injectable 25 Gram(s) IV Push once  dextrose 50% Injectable 25 Gram(s) IV Push once  finasteride 5 milliGRAM(s) Oral daily  gabapentin 100 milliGRAM(s) Oral at bedtime  heparin   Injectable 5000 Unit(s) SubCutaneous every 12 hours  indomethacin Suppository 100 milliGRAM(s) Rectal once  insulin lispro (HumaLOG) corrective regimen sliding scale   SubCutaneous three times a day before meals  insulin lispro (HumaLOG) corrective regimen sliding scale   SubCutaneous at bedtime  lactobacillus acidophilus 1 Tablet(s) Oral daily  pantoprazole    Tablet 40 milliGRAM(s) Oral two times a day  saccharomyces boulardii 250 milliGRAM(s) Oral two times a day  sodium chloride 0.9%. 1000 milliLiter(s) (100 mL/Hr) IV Continuous <Continuous>  sodium chloride 0.9%. 1000 milliLiter(s) (100 mL/Hr) IV Continuous <Continuous>  tamsulosin 0.4 milliGRAM(s) Oral at bedtime    MEDICATIONS  (PRN):  acetaminophen   Tablet .. 650 milliGRAM(s) Oral every 6 hours PRN Temp greater or equal to 38C (100.4F), Mild Pain (1 - 3)  dextrose 40% Gel 15 Gram(s) Oral once PRN Blood Glucose LESS THAN 70 milliGRAM(s)/deciliter  glucagon  Injectable 1 milliGRAM(s) IntraMuscular once PRN Glucose LESS THAN 70 milligrams/deciliter      PHYSICAL EXAM:    Vital Signs Last 24 Hrs  T(C): 37.1 (22 May 2020 07:40), Max: 37.1 (22 May 2020 07:40)  T(F): 98.8 (22 May 2020 07:40), Max: 98.8 (22 May 2020 07:40)  HR: 65 (22 May 2020 07:40) (59 - 74)  BP: 116/72 (22 May 2020 07:40) (97/42 - 116/72)  BP(mean): --  RR: 18 (22 May 2020 07:40) (18 - 20)  SpO2: 97% (22 May 2020 07:40) (89% - 97%)    General: alert  oriented x __3__                   well nourished  [place] conversant    HEENT: normal      Lungs: comfortable     CV: normal      GI: soft distended  tender                last BM: last night     : frequency     MSK:  weakness              ambulatory     Skin: normal  ___  no rash    LABS:                        10.2   13.35 )-----------( 298      ( 22 May 2020 08:19 )             31.0     05-22    138  |  104  |  63.0<H>  ----------------------------<  103<H>  4.4   |  21.0<L>  |  1.93<H>    Ca    8.6      22 May 2020 08:19    TPro  6.3<L>  /  Alb  2.9<L>  /  TBili  8.6<H>  /  DBili  x   /  AST  51<H>  /  ALT  75<H>  /  AlkPhos  454<H>  05-21    I&O's Summary    21 May 2020 07:01  -  22 May 2020 07:00  --------------------------------------------------------  IN: 0 mL / OUT: 250 mL / NET: -250 mL    22 May 2020 07:01  -  22 May 2020 12:19  --------------------------------------------------------  IN: 0 mL / OUT: 450 mL / NET: -450 mL    RADIOLOGY & ADDITIONAL STUDIES:  < from: CT Abdomen and Pelvis No Cont (05.18.20 @ 19:56) >  IMPRESSION:     Moderate biliary ductal dilatation due tomass effect from periportal lymphadenopathy.    Additional bulky retroperitoneal lymphadenopathy. Findings are compatible with either metastatic disease or lymphoma.      Limited evaluation of the pancreas. No definite pancreatic mass identified. Suggest MRI for better evaluation.    Bibasilar dependent airspace opacities could represent atelectasis or pneumonia.    ADVANCE DIRECTIVES:   DNR NO  Completed on:                     MOLST   NO   Completed on:  Living Will  YES    Completed on: HPI: This is an 88 yo M PMH of BPH, HTN, HLD, CKD3a OA S/P BORIS with distant scalp melanoma resection. Patient was sent for PMD office to evaluate and treat Jaundice.  His appetite much less, weight loss. He has had urinary frequency past two weeks; but denies dysuria, urgency. No fever, SOB N/V/D constipation last BM last night + gas pains  and L sided Abdominal "discomfort" which he has been living with past few months. Also experiencing dizziness and fell at home due to  episode of vertigo.  Mechanical soft diet lunch tray in front of patient, untouched-he didn't eat his breakfast "too distasteful", he agrees to drink Ensure.  Patient is S/P ERCP had sphincterotomy and Biliary Stent placement  2 days ago.     88 yo male with pmh of BPH, HTN, HLD, CKD3a, OA s/p L BORIS, distant scalp melanoma resection, was sent from pmd's office for further evaluation of painless jaundice. Reports low appetite and unintentional weight loss approx 20 lbs in last 2 months. No family hx of cancer. No hx of EtOH abuse. No prior incidents of jaundice. No hx of pancreatitis or biliary colic. Distant colonoscopy about 10 yrs ago was negative. Also reports urinary frequency for the past 2 weeks. Denies dysuria or urgency. No flank pain, fevers, chills, n/v, abd pain, or bleeding from anywhere.     ED course: Noted to be hypotensive on arrival with BP 83/36, improved to 113/53 s/p 1L NS. Noted significantly elevated lipase >3000, bili 9.5, . ctap with biliary duct dilatation due to mass effect from retroperitoneal lymphadenopathy. Findings compatible with either metastatic disease or lymphoma. Limited eval of pancreas. No definite pancreatic mass identified. MRCP (no contrast due to low GFR) for further eval, and prelim report consistent with ct scan findings. (18 May 2020 23:14)    PERTINENT PMH REVIEWED: Yes     PAST MEDICAL & SURGICAL HISTORY:  BPH (benign prostatic hyperplasia)  HLD (hyperlipidemia)  HTN (hypertension)  [place] (hard of hearing)  OA (osteoarthritis)  Melanoma  H/O hernia repair  H/O melanoma excision: scalp  S/P cataract surgery      SOCIAL HISTORY:  EtOH    No                                    Drugs   No                                    nonsmoker                                    Admitted from: home PMD office visit:  Wife Kaylee Dudley [phone]     FAMILY HISTORY:  No pertinent family history in first degree relatives    No Known Allergies    Baseline ADLs (prior to admission):  Independent/     Present Symptoms:     Dyspnea: 0   Nausea/Vomiting: No  Anxiety:  No  Depression: No  Fatigue: Yes   Loss of appetite: Yes     Pain: vague L sided abdominal pain            Character-            Duration-            Effect-            Factors-            Frequency-            Location-            Severity-mild?    Review of Systems: Reviewed                       All others negative    MEDICATIONS  (STANDING):  cefTRIAXone   IVPB 1000 milliGRAM(s) IV Intermittent every 24 hours  dextrose 5%. 1000 milliLiter(s) (50 mL/Hr) IV Continuous <Continuous>  dextrose 50% Injectable 12.5 Gram(s) IV Push once  dextrose 50% Injectable 25 Gram(s) IV Push once  dextrose 50% Injectable 25 Gram(s) IV Push once  finasteride 5 milliGRAM(s) Oral daily  gabapentin 100 milliGRAM(s) Oral at bedtime  heparin   Injectable 5000 Unit(s) SubCutaneous every 12 hours  indomethacin Suppository 100 milliGRAM(s) Rectal once  insulin lispro (HumaLOG) corrective regimen sliding scale   SubCutaneous three times a day before meals  insulin lispro (HumaLOG) corrective regimen sliding scale   SubCutaneous at bedtime  lactobacillus acidophilus 1 Tablet(s) Oral daily  pantoprazole    Tablet 40 milliGRAM(s) Oral two times a day  saccharomyces boulardii 250 milliGRAM(s) Oral two times a day  sodium chloride 0.9%. 1000 milliLiter(s) (100 mL/Hr) IV Continuous <Continuous>  sodium chloride 0.9%. 1000 milliLiter(s) (100 mL/Hr) IV Continuous <Continuous>  tamsulosin 0.4 milliGRAM(s) Oral at bedtime    MEDICATIONS  (PRN):  acetaminophen   Tablet .. 650 milliGRAM(s) Oral every 6 hours PRN Temp greater or equal to 38C (100.4F), Mild Pain (1 - 3)  dextrose 40% Gel 15 Gram(s) Oral once PRN Blood Glucose LESS THAN 70 milliGRAM(s)/deciliter  glucagon  Injectable 1 milliGRAM(s) IntraMuscular once PRN Glucose LESS THAN 70 milligrams/deciliter      PHYSICAL EXAM:    Vital Signs Last 24 Hrs  T(C): 37.1 (22 May 2020 07:40), Max: 37.1 (22 May 2020 07:40)  T(F): 98.8 (22 May 2020 07:40), Max: 98.8 (22 May 2020 07:40)  HR: 65 (22 May 2020 07:40) (59 - 74)  BP: 116/72 (22 May 2020 07:40) (97/42 - 116/72)  BP(mean): --  RR: 18 (22 May 2020 07:40) (18 - 20)  SpO2: 97% (22 May 2020 07:40) (89% - 97%)    General: alert  oriented x __3__                   well nourished  [place] conversant    HEENT: normal      Lungs: comfortable     CV: normal      GI: soft distended  tender                last BM: last night     : frequency     MSK:  weakness              ambulatory     Skin: normal  ___  no rash    LABS:                        10.2   13.35 )-----------( 298      ( 22 May 2020 08:19 )             31.0     05-22    138  |  104  |  63.0<H>  ----------------------------<  103<H>  4.4   |  21.0<L>  |  1.93<H>    Ca    8.6      22 May 2020 08:19    TPro  6.3<L>  /  Alb  2.9<L>  /  TBili  8.6<H>  /  DBili  x   /  AST  51<H>  /  ALT  75<H>  /  AlkPhos  454<H>  05-21    I&O's Summary    21 May 2020 07:01  -  22 May 2020 07:00  --------------------------------------------------------  IN: 0 mL / OUT: 250 mL / NET: -250 mL    22 May 2020 07:01  -  22 May 2020 12:19  --------------------------------------------------------  IN: 0 mL / OUT: 450 mL / NET: -450 mL    RADIOLOGY & ADDITIONAL STUDIES:  < from: CT Abdomen and Pelvis No Cont (05.18.20 @ 19:56) >  IMPRESSION:     Moderate biliary ductal dilatation due tomass effect from periportal lymphadenopathy.    Additional bulky retroperitoneal lymphadenopathy. Findings are compatible with either metastatic disease or lymphoma.      Limited evaluation of the pancreas. No definite pancreatic mass identified. Suggest MRI for better evaluation.    Bibasilar dependent airspace opacities could represent atelectasis or pneumonia.    ADVANCE DIRECTIVES:   DNR NO  Completed on:                     MOLST   NO   Completed on:  Living Will  YES    Completed on:    I spoke to Patient at his bedside, asked him about Living Will, HCP YES he has it at home. What are his wishes at end of life-He would not want to be resusitated "When you're gone that's it". "But my wife she needs me...if I could stick around a couple more months to be with her, then it would be worth it."I called his wife Kaylee we discussed her husbands condition, their intention to F/U with Oncology next week. I explained her husbands response to my question about CPR at first she agreed, then was reconsidering and said she needs to talk it over more with Patient.  I explained ROBER, that he should set it up for home- to protect him for unwanted resusitation. I discussed Hospice support, Medicare benefit to remember to consider their help  if Patient is not pursuing or has finished chemotherapy treatment for his cancer

## 2020-05-22 NOTE — CONSULT NOTE ADULT - ASSESSMENT
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 No discrete head mass identified.      Tumor markers CEA, CA 19-9,  elevated  ** Not a surgical candidate     Heme-onc eval noted    ABIGAIL on CKD3a  Kidney Function worsening  May be  dehydrated  Medicine ordered IVF hydration 100cc/hr x 24 hrs   creatinine remains elevated  Cr 1.78 on arrival, worse from baseline ~1.4  Avoid nephrotoxic meds    UTI   grossly positive UA and +urinary freq   C/w Rocephin day 5/7  urine culture negative     Poor Nutrition Protein Malnourished  Nutrition consult  Added Ensure 3x/d  Ordered his requests from Dietary  Prealbumin added to am labs    Insomnia  Remeron 7.5mg prn for insomnia  Melatonin 3mg PO standing at bedtime for sleep    GOC  F/U with HemOnc next week  Started conversation with Patient and then his wife seperately  He really does not want resusitation, he is worried about leaving his wife alone.  They will be having more conversation regarding both their End of Life wishes, and will have their Doctor do MOLST Directive

## 2020-05-22 NOTE — PROGRESS NOTE ADULT - ASSESSMENT
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 Cholangiocarcinoma is known to metastasize to isabelle-pancreatic, perigastric and celiac lymph node basin causing extensive lymphadenopathy as described on imaging as visualized on ERCP. Patient not deemed a surgical candidate for advanced stage cancer.   - Medical oncology consult   - Ca19-9 levels  - rest of care per primary team  - Plan discussed with Attending, Dr. 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 Cholangiocarcinoma is known to metastasize to isabelle-pancreatic, perigastric and celiac lymph node basin causing extensive lymphadenopathy as described on imaging as visualized on ERCP. Patient not deemed a surgical candidate for advanced stage cancer.   - Medical oncology consult   - Ca19-9 levels  - rest of care per primary team  -Please give referral for outpatient follow-up with Dr. Gallardo upon discharge  -Will sign-off call with questions

## 2020-05-22 NOTE — PROGRESS NOTE ADULT - ASSESSMENT
88 yo male with pmh of BPH, HTN, HLD, CKD3a, OA s/p L BORIS, distant scalp melanoma resection, was sent from pmd's office for further evaluation of painless jaundice. Noted significantly elevated lipase >3000, bili 9.5, . ctap with biliary duct dilatation due to mass effect from retroperitoneal lymphadenopathy. Findings compatible with either metastatic disease or lymphoma. No definite pancreatic mass identified. Admitted for further eval. MRCP complete, now s/p EUS/ERCP with noted nerotic duodenal ulcer, biopsies taken of heaped up margins, large lymphadenopathy around portal vein and pancreas, multiple LN around perigastric/celiac area (s/p FNA x4), distal bile duct stricture noted, stent placed within bile duct. Heme-Onc following.    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 No discrete head mass identified.   -numerous gall stones on sonogram, however no clinical signs of biliary colic  -Lipase downtrending 1781 from >3000, Bili now 6.4, but no clinical signs of pancreatitis, LFTs downtrending   -C/w ursodiol QD for pruritis  - tumor markers CEA, CA 19-9,  elevated  - Monitor LFTs  - heme-onc eval noted  - c/w PPI BID   - Follow up biopsy results  - d/c aspirin given necrotic ulcer    ABIGAIL on CKD3a  - creatinine remains elevated  - Cr 1.78 on arrival, worse from baseline ~1.4  - likely from dehydration  - c/w IVF hydration 100cc/hr x 24 hrs  - avoid nephrotoxic meds  - If no improvement in Cr, consult Nephro   - Continue to monitor   - CT abdomen a/p 5/18 - b/l renal cysts - f/u outpt, no hydronephrosis     UTI  - grossly positive UA and +urinary freq  - C/w Rocephin day 5/7  - urine culture negative     HTN  - BP remains soft  - hold home dose losartan since BPs low  - prn hydralazine for SBP>160    Elevated troponin  -no active anginal symptoms, ekg without acute ischemic changes  -trend cardiac enzymes  -likely secondary to renal failure    Normocytic anemia  -Hb at baseline ~10  -no active bleeding  -follow up o/p with pmd for anemia work up    HLD  - pt take ezetimibe at home. Unavailable at hospital.   - resume on discharge    BPH  -chronic, stable   -c/w proscar and flomax    DVT ppx:   -heparin 5000 units SQ BID    Dispo: D/c home in 1-2 days pending improvement of ABIGAIL. Pt to f/u heme onc outpatient in 1 week for biopsy results and further eval. 88 yo male with pmh of BPH, HTN, HLD, CKD3a, OA s/p L BORIS, distant scalp melanoma resection, was sent from pmd's office for further evaluation of painless jaundice. Noted significantly elevated lipase >3000, bili 9.5, . ctap with biliary duct dilatation due to mass effect from retroperitoneal lymphadenopathy. Findings compatible with either metastatic disease or lymphoma. No definite pancreatic mass identified. Admitted for further eval. MRCP complete, now s/p EUS/ERCP with noted nerotic duodenal ulcer, biopsies taken of heaped up margins, large lymphadenopathy around portal vein and pancreas, multiple LN around perigastric/celiac area (s/p FNA x4), distal bile duct stricture noted, stent placed within bile duct. Heme-Onc following.    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 No discrete head mass identified.   -numerous gall stones on sonogram, however no clinical signs of biliary colic  -Lipase downtrending 1781 from >3000, Bili now 6.4, but no clinical signs of pancreatitis, LFTs downtrending   -C/w ursodiol QD for pruritis  - tumor markers CEA, CA 19-9,  elevated  - Monitor LFTs  - heme-onc eval noted  - c/w PPI BID   - Follow up biopsy results  - d/c aspirin given necrotic ulcer    ABIGAIL on CKD3a  - creatinine remains elevated  - Cr 1.78 on arrival, worse from baseline ~1.4  - likely from dehydration  - c/w IVF hydration 100cc/hr x 24 hrs  - avoid nephrotoxic meds  - If no improvement in Cr, consult Nephro   - Continue to monitor   - CT abdomen a/p 5/18 - b/l renal cysts - f/u outpt, no hydronephrosis     UTI  - grossly positive UA and +urinary freq  - C/w Rocephin day 5/7  - urine culture negative     HTN  - BP remains soft  - hold home dose losartan since BPs low  - prn hydralazine for SBP>160    Elevated troponin  -no active anginal symptoms, ekg without acute ischemic changes  -trend cardiac enzymes  -likely secondary to renal failure    Normocytic anemia  -Hb at baseline ~10  -no active bleeding  -follow up o/p with pmd for anemia work up    HLD  - pt take ezetimibe at home. Unavailable at hospital.   - resume on discharge    BPH  -chronic, stable   -c/w proscar and flomax    DVT ppx:   -heparin 5000 units SQ BID    Attempted to call Wife, no answer.     Dispo: D/c home in 1-2 days pending improvement of ABIGAIL. Pt to f/u heme onc outpatient in 1 week for biopsy results and further eval. 89 yo male with pmh of BPH, HTN, HLD, CKD3a, OA s/p L BORIS, distant scalp melanoma resection, was sent from pmd's office for further evaluation of painless jaundice. Noted significantly elevated lipase >3000, bili 9.5, . ctap with biliary duct dilatation due to mass effect from retroperitoneal lymphadenopathy. Findings compatible with either metastatic disease or lymphoma. No definite pancreatic mass identified. Admitted for further eval. MRCP complete, now s/p EUS/ERCP with noted nerotic duodenal ulcer, biopsies taken of heaped up margins, large lymphadenopathy around portal vein and pancreas, multiple LN around perigastric/celiac area (s/p FNA x4), distal bile duct stricture noted, stent placed within bile duct. Heme-Onc following.    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 No discrete head mass identified.   -numerous gall stones on sonogram, however no clinical signs of biliary colic  -Lipase downtrending 1781 from >3000, Bili now 6.4, but no clinical signs of pancreatitis, LFTs downtrending   -C/w ursodiol QD for pruritis  - tumor markers CEA, CA 19-9,  elevated  - Monitor LFTs  - heme-onc eval noted  - c/w PPI BID   - Follow up biopsy results  - d/c aspirin given necrotic ulcer    ABIGAIL on CKD3a  - creatinine remains elevated  - Cr 1.78 on arrival, worse from baseline ~1.4  - likely from dehydration  - c/w IVF hydration 100cc/hr x 24 hrs  - avoid nephrotoxic meds  - If no improvement in Cr, consult Nephro   - Continue to monitor   - CT abdomen a/p 5/18 - b/l renal cysts - f/u outpt, no hydronephrosis     UTI  - grossly positive UA and +urinary freq  - C/w Rocephin day 5/7  - urine culture negative     HTN  - BP remains soft  - hold home dose losartan since BPs low  - prn hydralazine for SBP>160    Elevated troponin  -no active anginal symptoms, ekg without acute ischemic changes  -trend cardiac enzymes  -likely secondary to renal failure    Normocytic anemia  -Hb at baseline ~10  -no active bleeding  -follow up o/p with pmd for anemia work up    HLD  - pt take ezetimibe at home. Unavailable at hospital.   - resume on discharge    BPH  -chronic, stable   -c/w proscar and flomax    DVT ppx:   -heparin 5000 units SQ BID    Physical deconditioning    -PT EVAL. C/S PT        Dispo: D/c planning pending PT.  Pt to f/u heme onc outpatient in 1 week for biopsy results and further eval.     Care of plan discussed with pt.Per pt he is okay with above plan.  Care of plan dw wife Kaylee [phone]. Agreed with above. She thinks pt is very weak, [facility]BERT disposition will be better for pt's physical recovery.

## 2020-05-22 NOTE — PROGRESS NOTE ADULT - SUBJECTIVE AND OBJECTIVE BOX
Patient is a 89y old  Male who presents with a chief complaint of obstructive jaundice with concern for malignancy (21 May 2020 07:56)    INTERVAL/OVERNIGHT EVENTS:  Patient examined at beside. No acute events overnight. Patient is s/p EUS/ERCP on 5/20 to evaluate biliary ductal dilation of CBD, ?pancreatic mass, patient is stable post procedure. Per GI large duodenal necrotic ulcer with heaped up margins found, +large lymphadenopathy causing obstruction, stent placed. Gastric biopsy done, pending result. Patient is tolerating PO diet, ambulating minimally. Patient states itching has improved, denies any SOB, chest pain, abdominal pain, n/v, diarrhea. Remainder of ROS negative.    I&O's Summary    21 May 2020 07:01  -  22 May 2020 07:00  --------------------------------------------------------  IN: 0 mL / OUT: 250 mL / NET: -250 mL    22 May 2020 07:01  -  22 May 2020 12:27  --------------------------------------------------------  IN: 0 mL / OUT: 450 mL / NET: -450 mL    CAPILLARY BLOOD GLUCOSE  POCT  Blood Glucose: 106 mg/dL (05.22.20 @ 11:30)  POCT  Blood Glucose: 93 mg/dL (05.22.20 @ 07:49)  POCT  Blood Glucose: 107 mg/dL (05.21.20 @ 21:13)    Vital Signs Last 24 Hrs  T(C): 37.1 (22 May 2020 07:40), Max: 37.1 (22 May 2020 07:40)  T(F): 98.8 (22 May 2020 07:40), Max: 98.8 (22 May 2020 07:40)  HR: 65 (22 May 2020 07:40) (59 - 74)  BP: 116/72 (22 May 2020 07:40) (97/42 - 116/72)  RR: 18 (22 May 2020 07:40) (18 - 20)  SpO2: 97% (22 May 2020 07:40) (89% - 97%)    General: NAD, resting comfortably in bed, jaundiced  Head: NC/AT, circular left scalp melanoma resection scar noted  Neck: supple, no cervical lymphadenopathy  Pulm: CTA bilaterally, no crackles or wheezes  Cardio: S1S2+, RRR, no murmurs  GI: soft, BS+, ND,NT, no guarding or rigidity  Vascular: no pitting edema, DP pulses 2+ b/l, no calf tenderness  Skin: warm and dry, visibly jaundiced (head, trunk, extremities)    LABS                        10.2   13.35 )-----------( 298      ( 22 May 2020 08:19 )             31.0     05-22    138  |  104  |  63.0<H>  ----------------------------<  103<H>  4.4   |  21.0<L>  |  1.93<H>    Ca    8.6      22 May 2020 08:19    TPro  5.8<L>  /  Alb  2.5<L>  /  TBili  6.4<H>  /  DBili  5.5<H>  /  AST  41<H>  /  ALT  55<H>  /  AlkPhos  393<H>  05-22    Lipase, Serum in AM (05.21.20 @ 07:58)    Lipase, Serum: 1781 U/L    Cancer Antigen, 125 (05.19.20 @ 01:14)  Cancer Antigen, GI Ca 19-9 (05.19.20 @ 01:14)  Carcinoembryonic Antigen 339 (05.19.20 @ 01:14)    Culture - Urine (collected 19 May 2020 01:54)  Final Report (19 May 2020 22:18):    <10,000 CFU/mL Normal Urogenital Nicole    IMAGING  US Abdomen Complete (05.18.20 @ 19:17)  IMPRESSION:   Intrahepatic and CBD dilatation. Numerous gallstones. Pancreas is not visualizedsecondary to overlying bowel gas. Further evaluation with CT abdomen/pelvis is recommended.  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  Several cysts in the kidneys as above.    DIET: mechanical soft, DASH/TLC    MEDICATIONS  (STANDING):  cefTRIAXone   IVPB 1000 milliGRAM(s) IV Intermittent every 24 hours  dextrose 5%. 1000 milliLiter(s) (50 mL/Hr) IV Continuous <Continuous>  dextrose 50% Injectable 12.5 Gram(s) IV Push once  dextrose 50% Injectable 25 Gram(s) IV Push once  dextrose 50% Injectable 25 Gram(s) IV Push once  finasteride 5 milliGRAM(s) Oral daily  gabapentin 100 milliGRAM(s) Oral at bedtime  heparin   Injectable 5000 Unit(s) SubCutaneous every 12 hours  indomethacin Suppository 100 milliGRAM(s) Rectal once  insulin lispro (HumaLOG) corrective regimen sliding scale   SubCutaneous three times a day before meals  insulin lispro (HumaLOG) corrective regimen sliding scale   SubCutaneous at bedtime  lactobacillus acidophilus 1 Tablet(s) Oral daily  pantoprazole    Tablet 40 milliGRAM(s) Oral two times a day  saccharomyces boulardii 250 milliGRAM(s) Oral two times a day  sodium chloride 0.9%. 1000 milliLiter(s) (100 mL/Hr) IV Continuous <Continuous>  sodium chloride 0.9%. 1000 milliLiter(s) (100 mL/Hr) IV Continuous <Continuous>  tamsulosin 0.4 milliGRAM(s) Oral at bedtime    MEDICATIONS  (PRN):  acetaminophen   Tablet .. 650 milliGRAM(s) Oral every 6 hours PRN Temp greater or equal to 38C (100.4F), Mild Pain (1 - 3)  dextrose 40% Gel 15 Gram(s) Oral once PRN Blood Glucose LESS THAN 70 milliGRAM(s)/deciliter  glucagon  Injectable 1 milliGRAM(s) IntraMuscular once PRN Glucose LESS THAN 70 milligrams/deciliter

## 2020-05-22 NOTE — PROGRESS NOTE ADULT - SUBJECTIVE AND OBJECTIVE BOX
HPI/OVERNIGHT EVENTS: no acute events. Pain controlled. tolerating DASH diet. bilirubin yesterday was 8.6. AVSS HD stable    MEDICATIONS  (STANDING):  cefTRIAXone   IVPB 1000 milliGRAM(s) IV Intermittent every 24 hours  dextrose 5%. 1000 milliLiter(s) (50 mL/Hr) IV Continuous <Continuous>  dextrose 50% Injectable 12.5 Gram(s) IV Push once  dextrose 50% Injectable 25 Gram(s) IV Push once  dextrose 50% Injectable 25 Gram(s) IV Push once  finasteride 5 milliGRAM(s) Oral daily  heparin   Injectable 5000 Unit(s) SubCutaneous every 12 hours  indomethacin Suppository 100 milliGRAM(s) Rectal once  insulin lispro (HumaLOG) corrective regimen sliding scale   SubCutaneous three times a day before meals  insulin lispro (HumaLOG) corrective regimen sliding scale   SubCutaneous at bedtime  lactobacillus acidophilus 1 Tablet(s) Oral daily  pantoprazole    Tablet 40 milliGRAM(s) Oral two times a day  saccharomyces boulardii 250 milliGRAM(s) Oral two times a day  sodium chloride 0.9%. 1000 milliLiter(s) (100 mL/Hr) IV Continuous <Continuous>  tamsulosin 0.4 milliGRAM(s) Oral at bedtime    MEDICATIONS  (PRN):  acetaminophen   Tablet .. 650 milliGRAM(s) Oral every 6 hours PRN Temp greater or equal to 38C (100.4F), Mild Pain (1 - 3)  dextrose 40% Gel 15 Gram(s) Oral once PRN Blood Glucose LESS THAN 70 milliGRAM(s)/deciliter  glucagon  Injectable 1 milliGRAM(s) IntraMuscular once PRN Glucose LESS THAN 70 milligrams/deciliter      Vital Signs Last 24 Hrs  T(C): 36.7 (21 May 2020 23:32), Max: 36.7 (21 May 2020 16:15)  T(F): 98 (21 May 2020 23:32), Max: 98.1 (21 May 2020 16:15)  HR: 61 (21 May 2020 23:32) (57 - 74)  BP: 102/51 (21 May 2020 23:32) (91/46 - 103/62)  BP(mean): --  RR: 20 (21 May 2020 23:32) (18 - 20)  SpO2: 97% (21 May 2020 23:32) (89% - 98%)    General: NAD, Lying in bed comfortably, jaundice  Neuro: A+Ox3,   HEENT: NC/AT, EOM, scleral icterus hard of hearing, hearing aid to right ear, left temporal 5.5x5.5cm well healed skin graft scar from previous melanoma excision  Neck: Soft, supple  Cardio: RRR, nml S1/S2  Resp: Good effort, CTA b/l  Thorax: No chest wall tenderness  GI/Abd: Soft, NT/ND, no rebound/guarding, no masses palpated, right inguinal well-healed scar  Vascular: All 4 extremities warm.  Skin: Intact, no breakdown  Lymphatic/Nodes: No palpable lymphadenopathy  Musculoskeletal: All 4 extremities moving spontaneously, no limitations    I&O's Detail    21 May 2020 07:01  -  22 May 2020 01:04  --------------------------------------------------------  IN:  Total IN: 0 mL    OUT:    Voided: 250 mL  Total OUT: 250 mL    Total NET: -250 mL          LABS:                        11.1   10.71 )-----------( 301      ( 21 May 2020 07:58 )             34.7     05-21    139  |  102  |  60.0<H>  ----------------------------<  100<H>  4.4   |  23.0  |  1.91<H>    Ca    9.1      21 May 2020 07:58    TPro  6.3<L>  /  Alb  2.9<L>  /  TBili  8.6<H>  /  DBili  x   /  AST  51<H>  /  ALT  75<H>  /  AlkPhos  454<H>  05-21

## 2020-05-22 NOTE — PROGRESS NOTE ADULT - SUBJECTIVE AND OBJECTIVE BOX
Patient seen and examined; chart reviewed.  S/P ERCP/FNA and Biliary stent placement, 2 days ago.  Feels ok.  Tolerates some food.  No fever or abdominal pain.  Looks good. Kidney function still worsening.      PAST MEDICAL & SURGICAL HISTORY:  BPH (benign prostatic hyperplasia)  HLD (hyperlipidemia)  HTN (hypertension)  [place] (hard of hearing)  OA (osteoarthritis)  Melanoma  H/O hernia repair  H/O melanoma excision: scalp  S/P cataract surgery      ROS:  No Heartburn, regurgitation, dysphagia, odynophagia.  No dyspepsia  No abdominal pain.    No Nausea, vomiting.  No Bleeding.  No hematemesis.   No diarrhea.    No hematochesia.  No weight loss, anorexia.  No edema.      MEDICATIONS  (STANDING):  cefTRIAXone   IVPB 1000 milliGRAM(s) IV Intermittent every 24 hours  dextrose 5%. 1000 milliLiter(s) (50 mL/Hr) IV Continuous <Continuous>  dextrose 50% Injectable 12.5 Gram(s) IV Push once  dextrose 50% Injectable 25 Gram(s) IV Push once  dextrose 50% Injectable 25 Gram(s) IV Push once  finasteride 5 milliGRAM(s) Oral daily  heparin   Injectable 5000 Unit(s) SubCutaneous every 12 hours  indomethacin Suppository 100 milliGRAM(s) Rectal once  insulin lispro (HumaLOG) corrective regimen sliding scale   SubCutaneous three times a day before meals  insulin lispro (HumaLOG) corrective regimen sliding scale   SubCutaneous at bedtime  lactobacillus acidophilus 1 Tablet(s) Oral daily  pantoprazole    Tablet 40 milliGRAM(s) Oral two times a day  saccharomyces boulardii 250 milliGRAM(s) Oral two times a day  sodium chloride 0.9%. 1000 milliLiter(s) (100 mL/Hr) IV Continuous <Continuous>  tamsulosin 0.4 milliGRAM(s) Oral at bedtime    MEDICATIONS  (PRN):  acetaminophen   Tablet .. 650 milliGRAM(s) Oral every 6 hours PRN Temp greater or equal to 38C (100.4F), Mild Pain (1 - 3)  dextrose 40% Gel 15 Gram(s) Oral once PRN Blood Glucose LESS THAN 70 milliGRAM(s)/deciliter  glucagon  Injectable 1 milliGRAM(s) IntraMuscular once PRN Glucose LESS THAN 70 milligrams/deciliter      Allergies    No Known Allergies    Intolerances        Vital Signs Last 24 Hrs  T(C): 37.1 (22 May 2020 07:40), Max: 37.1 (22 May 2020 07:40)  T(F): 98.8 (22 May 2020 07:40), Max: 98.8 (22 May 2020 07:40)  HR: 65 (22 May 2020 07:40) (59 - 74)  BP: 116/72 (22 May 2020 07:40) (97/42 - 116/72)  BP(mean): --  RR: 18 (22 May 2020 07:40) (18 - 20)  SpO2: 97% (22 May 2020 07:40) (89% - 97%)    PHYSICAL EXAM:    GENERAL: NAD, well-groomed, well-developed  HEAD:  Atraumatic, Normocephalic  EYES: EOMI, PERRLA, conjunctiva and sclera clear  ENMT: No tonsillar erythema, exudates, or enlargement; Moist mucous membranes, Good dentition, No lesions  NECK: Supple, No JVD, Normal thyroid  CHEST/LUNG: Clear to percussion bilaterally; No rales, rhonchi, wheezing, or rubs  HEART: Regular rate and rhythm; No murmurs, rubs, or gallops  ABDOMEN: Soft, Nontender, Nondistended; Bowel sounds present  EXTREMITIES:  2+ Peripheral Pulses, No clubbing, cyanosis, or edema  LYMPH: No lymphadenopathy noted  SKIN: No rashes or lesions      LABS:                        10.2   13.35 )-----------( 298      ( 22 May 2020 08:19 )             31.0     05-22    138  |  104  |  63.0<H>  ----------------------------<  103<H>  4.4   |  21.0<L>  |  1.93<H>    Ca    8.6      22 May 2020 08:19    TPro  6.3<L>  /  Alb  2.9<L>  /  TBili  8.6<H>  /  DBili  x   /  AST  51<H>  /  ALT  75<H>  /  AlkPhos  454<H>  05-21             RADIOLOGY & ADDITIONAL STUDIES:

## 2020-05-22 NOTE — PROGRESS NOTE ADULT - ASSESSMENT
DU. Needs long term PPI for healing of the DU.     Distal CBD stricture, long. Neighboring adenopathy, biopsied.   S/P successful placement of uncovered CBD metal stent.  Slow improvement of the Bili and LFT's  Declining Lipase.  No clinical pancreatitis.      Improving.  Mobilize.  Soon for discharge.  Advance diet  Eventual call back to office in 1 week for path check with Dr Dutta or me.

## 2020-05-22 NOTE — CONSULT NOTE ADULT - REASON FOR ADMISSION
obstructive jaundice with concern for malignancy
Michael pancreatitis.

## 2020-05-22 NOTE — CONSULT NOTE ADULT - CONSULT REASON
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

## 2020-05-22 NOTE — CHART NOTE - NSCHARTNOTEFT_GEN_A_CORE
Upon Nutritional Assessment by the Registered Dietitian your patient was determined to meet criteria / has evidence of the following diagnosis/diagnoses:          [ ]  Mild Protein Calorie Malnutrition        [ ]  Moderate Protein Calorie Malnutrition        [x] Severe Protein Calorie Malnutrition  CHRONIC        [ ] Unspecified Protein Calorie Malnutrition        [ ] Underweight / BMI <19        [ ] Morbid Obesity / BMI > 40      Findings as based on:  •  Comprehensive nutrition assessment and consultation  •  Calorie counts (nutrient intake analysis)  •  Food acceptance and intake status from observations by staff  •  Follow up  •  Patient education  •  Intervention secondary to interdisciplinary rounds  •   concerns      Treatment:    The following diet has been recommended:  Rec Ensure TID, Provide Magic Cup TID      PROVIDER Section:     By signing this assessment you are acknowledging and agree with the diagnosis/diagnoses assigned by the Registered Dietitian    Comments:

## 2020-05-22 NOTE — PROGRESS NOTE ADULT - ATTENDING COMMENTS
Patient seen and examined and discussed with resident team this morning.   S/p Biopsy of duodenal ulcer and LN.   Appreciate consultant input.  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  Not a surgical candidate due to extent of disease.    Will await final pathology.  Will need outpatient follow up with oncology for treatment options.  Would expect possible D/C in next 1-2 days pending repeat labs for evaluation of ABIGAIL (suspect prerenal etiology).  Continue IVFs and encourage PO intake. Care of plan discussed with pt.Per pt he is okay with above plan.  Care of plan dw wife Kaylee [phone]. Agreed with above. She thinks pt is very weak, [facility]BERT disposition will be better for pt's physical recovery.

## 2020-05-22 NOTE — DIETITIAN INITIAL EVALUATION ADULT. - PERTINENT LABORATORY DATA
05-22 Na138 mmol/L Glu 103 mg/dL<H> K+ 4.4 mmol/L Cr  1.93 mg/dL<H> BUN 63.0 mg/dL<H> Phos n/a   Alb 2.5 g/dL<L> PAB n/a

## 2020-05-22 NOTE — DIETITIAN INITIAL EVALUATION ADULT. - OTHER INFO
89 yo male with pmh of BPH, HTN, HLD, CKD3a, OA s/p L BORIS, distant scalp melanoma resection, was sent from pmd's office for further evaluation of painless jaundice. Pt reports decreased appetite and unintentional weight loss approx 20 lbs in last 2 months. No family hx of cancer. Pt sleeping at this time and was unarousable when calling out name.

## 2020-05-22 NOTE — CONSULT NOTE ADULT - ATTENDING COMMENTS
COUNSELING:    Face to face meeting to discuss Advanced Care Planning - Time Spent ______ Minutes.  See goals of care note.    More than 50% time spent in counseling and coordinating care. __40____ Minutes.     Thank you for the opportunity to assist with the care of this patient.   [facility] [phone]. COUNSELING:    Face to face meeting to discuss Advanced Care Planning - Time Spent _20_____ Minutes.  See goals of care note.    More than 50% time spent in counseling and coordinating care. __40____ Minutes.     Thank you for the opportunity to assist with the care of this patient.   [facility] [phone]  .

## 2020-05-23 DIAGNOSIS — K83.1 OBSTRUCTION OF BILE DUCT: ICD-10-CM

## 2020-05-23 LAB
ALBUMIN SERPL ELPH-MCNC: 2.2 G/DL — LOW (ref 3.3–5.2)
ALP SERPL-CCNC: 351 U/L — HIGH (ref 40–120)
ALT FLD-CCNC: 45 U/L — HIGH
ANION GAP SERPL CALC-SCNC: 12 MMOL/L — SIGNIFICANT CHANGE UP (ref 5–17)
AST SERPL-CCNC: 40 U/L — HIGH
BILIRUB SERPL-MCNC: 5.2 MG/DL — HIGH (ref 0.4–2)
BUN SERPL-MCNC: 57 MG/DL — HIGH (ref 8–20)
CALCIUM SERPL-MCNC: 8.5 MG/DL — LOW (ref 8.6–10.2)
CHLORIDE SERPL-SCNC: 107 MMOL/L — SIGNIFICANT CHANGE UP (ref 98–107)
CO2 SERPL-SCNC: 21 MMOL/L — LOW (ref 22–29)
CREAT SERPL-MCNC: 1.45 MG/DL — HIGH (ref 0.5–1.3)
CULTURE RESULTS: SIGNIFICANT CHANGE UP
CULTURE RESULTS: SIGNIFICANT CHANGE UP
GLUCOSE BLDC GLUCOMTR-MCNC: 123 MG/DL — HIGH (ref 70–99)
GLUCOSE BLDC GLUCOMTR-MCNC: 129 MG/DL — HIGH (ref 70–99)
GLUCOSE BLDC GLUCOMTR-MCNC: 130 MG/DL — HIGH (ref 70–99)
GLUCOSE BLDC GLUCOMTR-MCNC: 146 MG/DL — HIGH (ref 70–99)
GLUCOSE SERPL-MCNC: 135 MG/DL — HIGH (ref 70–99)
HCT VFR BLD CALC: 29.5 % — LOW (ref 39–50)
HGB BLD-MCNC: 9.6 G/DL — LOW (ref 13–17)
MCHC RBC-ENTMCNC: 29 PG — SIGNIFICANT CHANGE UP (ref 27–34)
MCHC RBC-ENTMCNC: 32.5 GM/DL — SIGNIFICANT CHANGE UP (ref 32–36)
MCV RBC AUTO: 89.1 FL — SIGNIFICANT CHANGE UP (ref 80–100)
PLATELET # BLD AUTO: 294 K/UL — SIGNIFICANT CHANGE UP (ref 150–400)
POTASSIUM SERPL-MCNC: 4.5 MMOL/L — SIGNIFICANT CHANGE UP (ref 3.5–5.3)
POTASSIUM SERPL-SCNC: 4.5 MMOL/L — SIGNIFICANT CHANGE UP (ref 3.5–5.3)
PROT SERPL-MCNC: 5.7 G/DL — LOW (ref 6.6–8.7)
RBC # BLD: 3.31 M/UL — LOW (ref 4.2–5.8)
RBC # FLD: 15.9 % — HIGH (ref 10.3–14.5)
SODIUM SERPL-SCNC: 140 MMOL/L — SIGNIFICANT CHANGE UP (ref 135–145)
SPECIMEN SOURCE: SIGNIFICANT CHANGE UP
SPECIMEN SOURCE: SIGNIFICANT CHANGE UP
WBC # BLD: 13.45 K/UL — HIGH (ref 3.8–10.5)
WBC # FLD AUTO: 13.45 K/UL — HIGH (ref 3.8–10.5)

## 2020-05-23 PROCEDURE — 99233 SBSQ HOSP IP/OBS HIGH 50: CPT

## 2020-05-23 RX ADMIN — CEFTRIAXONE 100 MILLIGRAM(S): 500 INJECTION, POWDER, FOR SOLUTION INTRAMUSCULAR; INTRAVENOUS at 00:36

## 2020-05-23 RX ADMIN — GABAPENTIN 100 MILLIGRAM(S): 400 CAPSULE ORAL at 21:13

## 2020-05-23 RX ADMIN — HEPARIN SODIUM 5000 UNIT(S): 5000 INJECTION INTRAVENOUS; SUBCUTANEOUS at 17:28

## 2020-05-23 RX ADMIN — Medication 3 MILLIGRAM(S): at 21:13

## 2020-05-23 RX ADMIN — HEPARIN SODIUM 5000 UNIT(S): 5000 INJECTION INTRAVENOUS; SUBCUTANEOUS at 05:14

## 2020-05-23 RX ADMIN — Medication 250 MILLIGRAM(S): at 17:28

## 2020-05-23 RX ADMIN — FINASTERIDE 5 MILLIGRAM(S): 5 TABLET, FILM COATED ORAL at 11:29

## 2020-05-23 RX ADMIN — PANTOPRAZOLE SODIUM 40 MILLIGRAM(S): 20 TABLET, DELAYED RELEASE ORAL at 17:28

## 2020-05-23 RX ADMIN — SODIUM CHLORIDE 100 MILLILITER(S): 9 INJECTION INTRAMUSCULAR; INTRAVENOUS; SUBCUTANEOUS at 00:16

## 2020-05-23 RX ADMIN — PANTOPRAZOLE SODIUM 40 MILLIGRAM(S): 20 TABLET, DELAYED RELEASE ORAL at 05:14

## 2020-05-23 RX ADMIN — TAMSULOSIN HYDROCHLORIDE 0.4 MILLIGRAM(S): 0.4 CAPSULE ORAL at 21:13

## 2020-05-23 RX ADMIN — Medication 1 TABLET(S): at 11:29

## 2020-05-23 RX ADMIN — SODIUM CHLORIDE 100 MILLILITER(S): 9 INJECTION INTRAMUSCULAR; INTRAVENOUS; SUBCUTANEOUS at 07:41

## 2020-05-23 RX ADMIN — Medication 250 MILLIGRAM(S): at 05:15

## 2020-05-23 RX ADMIN — CEFTRIAXONE 100 MILLIGRAM(S): 500 INJECTION, POWDER, FOR SOLUTION INTRAMUSCULAR; INTRAVENOUS at 22:51

## 2020-05-23 NOTE — PROGRESS NOTE ADULT - SUBJECTIVE AND OBJECTIVE BOX
Patient is a 89y old  Male who presents with a chief complaint of obstructive jaundice with concern for malignancy (22 May 2020 12:15)  Pt seen and exam. Hard to hearing. Pt states he is feeling better then yesterday. Denies abd pain,sob,chest pain,n/v/d.    SUBJECTIVE / OVERNIGHT EVENTS: None per RN  REVIEW OF SYSTEMS: All systems are reviewed and found to be negative EXCEPT ABOVE    MEDICATIONS  (STANDING):  cefTRIAXone   IVPB 1000 milliGRAM(s) IV Intermittent every 24 hours  dextrose 5%. 1000 milliLiter(s) (50 mL/Hr) IV Continuous <Continuous>  dextrose 50% Injectable 12.5 Gram(s) IV Push once  dextrose 50% Injectable 25 Gram(s) IV Push once  dextrose 50% Injectable 25 Gram(s) IV Push once  finasteride 5 milliGRAM(s) Oral daily  gabapentin 100 milliGRAM(s) Oral at bedtime  heparin   Injectable 5000 Unit(s) SubCutaneous every 12 hours  indomethacin Suppository 100 milliGRAM(s) Rectal once  insulin lispro (HumaLOG) corrective regimen sliding scale   SubCutaneous three times a day before meals  insulin lispro (HumaLOG) corrective regimen sliding scale   SubCutaneous at bedtime  lactobacillus acidophilus 1 Tablet(s) Oral daily  melatonin 3 milliGRAM(s) Oral at bedtime  pantoprazole    Tablet 40 milliGRAM(s) Oral two times a day  saccharomyces boulardii 250 milliGRAM(s) Oral two times a day  sodium chloride 0.9%. 1000 milliLiter(s) (100 mL/Hr) IV Continuous <Continuous>  tamsulosin 0.4 milliGRAM(s) Oral at bedtime    MEDICATIONS  (PRN):  acetaminophen   Tablet .. 650 milliGRAM(s) Oral every 6 hours PRN Temp greater or equal to 38C (100.4F), Mild Pain (1 - 3)  dextrose 40% Gel 15 Gram(s) Oral once PRN Blood Glucose LESS THAN 70 milliGRAM(s)/deciliter  glucagon  Injectable 1 milliGRAM(s) IntraMuscular once PRN Glucose LESS THAN 70 milligrams/deciliter  mirtazapine 7.5 milliGRAM(s) Oral at bedtime PRN insomnia      CAPILLARY BLOOD GLUCOSE      POCT Blood Glucose.: 123 mg/dL (23 May 2020 07:31)  POCT Blood Glucose.: 157 mg/dL (22 May 2020 21:00)  POCT Blood Glucose.: 143 mg/dL (22 May 2020 17:01)  POCT Blood Glucose.: 106 mg/dL (22 May 2020 11:30)    I&O's Summary    22 May 2020 07:01  -  23 May 2020 07:00  --------------------------------------------------------  IN: 0 mL / OUT: 600 mL / NET: -600 mL    23 May 2020 07:01  -  23 May 2020 10:12  --------------------------------------------------------  IN: 440 mL / OUT: 0 mL / NET: 440 mL        PHYSICAL EXAM:  Vital Signs Last 24 Hrs  T(C): 36.8 (23 May 2020 09:38), Max: 37.2 (22 May 2020 20:00)  T(F): 98.2 (23 May 2020 09:38), Max: 99 (22 May 2020 20:00)  HR: 78 (23 May 2020 09:38) (69 - 90)  BP: 125/68 (23 May 2020 09:38) (119/72 - 137/68)  BP(mean): --  RR: 18 (23 May 2020 09:38) (18 - 18)  SpO2: 94% (23 May 2020 09:38) (92% - 95%)      General: NAD, resting comfortably in bed, jaundiced  Head: NC/AT, circular left scalp melanoma resection scar noted  Neck: supple, no cervical lymphadenopathy  Pulm: CTA bilaterally, no crackles or wheezes  Cardio: S1S2+, RRR, no murmurs  GI: soft, BS+, ND,NT, no guarding or rigidity  Exts : no pitting edema, DP pulses 2+ b/l, no calf tenderness  Skin: warm and dry, visibly jaundiced (head, trunk, extremities)  PSYCH: affect appropriate.  NEUROLOGY: A+O to person, place, and time; Hard to hearing. no gross sensory deficits;       LABS:                        9.6    13.45 )-----------( 294      ( 23 May 2020 07:37 )             29.5     05-23    140  |  107  |  57.0<H>  ----------------------------<  135<H>  4.5   |  21.0<L>  |  1.45<H>    Ca    8.5<L>      23 May 2020 07:37    TPro  5.7<L>  /  Alb  2.2<L>  /  TBili  5.2<H>  /  DBili  x   /  AST  40<H>  /  ALT  45<H>  /  AlkPhos  351<H>  05-23                RADIOLOGY & ADDITIONAL TESTS:  Results Reviewed: by me  Imaging Personally Reviewed:  Electrocardiogram Personally Reviewed:    COORDINATION OF CARE:  Care Discussed with Consultants/Other Providers [Y/N]:  Prior or Outpatient Records Reviewed [Y/N]:

## 2020-05-23 NOTE — PROGRESS NOTE ADULT - PROBLEM SELECTOR PLAN 1
Improving. In NAD. LFT's improving. Consider D/C from hospital once medically optimized with OPT f/u with Dr. Dandre Dutta in 1 to 2 weeks. Thank you.

## 2020-05-23 NOTE — PROGRESS NOTE ADULT - ASSESSMENT
89 yo male with pmh of BPH, HTN, HLD, CKD3a, OA s/p L BORIS, distant scalp melanoma resection, was sent from pmd's office for further evaluation of painless jaundice. Noted significantly elevated lipase >3000, bili 9.5, . ctap with biliary duct dilatation due to mass effect from retroperitoneal lymphadenopathy. Findings compatible with either metastatic disease or lymphoma. No definite pancreatic mass identified. Admitted for further eval. MRCP complete, now s/p EUS/ERCP with noted nerotic duodenal ulcer, biopsies taken of heaped up margins, large lymphadenopathy around portal vein and pancreas, multiple LN around perigastric/celiac area (s/p FNA x4), distal bile duct stricture noted, stent placed within bile duct. Heme-Onc following.    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 No discrete head mass identified.   -numerous gall stones on sonogram, however no clinical signs of biliary colic  -Lipase downtrending  from >3000, Bili high but no clinical signs of pancreatitis, LFTs downtrending   -C/w ursodiol QD for pruritis  - tumor markers CEA, CA 19-9,  elevated  - Monitor LFTs  - heme-onc eval noted  - c/w PPI BID   - Follow up biopsy results  - d/c aspirin given necrotic ulcer    ABIGAIL on CKD3a likely from dehydration  - creatinine remains elevated  - Cr 1.78 on arrival trending down to base line.  baseline ~1.4  - likely from dehydration  - c/w IVF hydration 100cc/hr x 24 hrs  - avoid nephrotoxic meds  - If no improvement in Cr, consult Nephro   - Continue to monitor   - CT abdomen a/p 5/18 - b/l renal cysts - f/u outpt, no hydronephrosis     UTI  - grossly positive UA and +urinary freq  - C/w Rocephin day 5/7  - urine culture negative     HTN  - BP remains soft  - hold home dose losartan since BPs low  - prn hydralazine for SBP>160    Elevated troponin  -no active anginal symptoms, ekg without acute ischemic changes  -trend cardiac enzymes  -likely secondary to renal failure    Normocytic anemia  -Hb at baseline ~10  -no active bleeding  -follow up o/p with pmd for anemia work up    HLD  - pt take ezetimibe at home. Unavailable at hospital.   - resume on discharge    BPH  -chronic, stable   -c/w proscar and flomax    DVT ppx:   -heparin 5000 units SQ BID    Attempted to call Wife, no answer.     Dispo:  S/p Biopsy of duodenal ulcer and LN.   Appreciate consultant input.  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  Not a surgical candidate due to extent of disease.    Will await final pathology.  Will need outpatient follow up with oncology for treatment options.  Would expect possible D/C in next 1-2 days pending repeat labs for evaluation of ABIGAIL (suspect prerenal etiology).  Continue IVFs and encourage PO intake

## 2020-05-23 NOTE — PROGRESS NOTE ADULT - SUBJECTIVE AND OBJECTIVE BOX
Pt seen and examined. In NAD when seen. No c/o abdominal pain. BR down slightly today.    REVIEW OF SYSTEMS:  Constitutional: No fever, weight loss or fatigue  Cardiovascular: No chest pain, palpitations, dizziness or leg swelling  Gastrointestinal:  As noted above. No abdominal or epigastric pain. No nausea, vomiting or hematemesis; No diarrhea or constipation. No melena or hematochezia.  Skin: No itching, burning, rashes or lesions       MEDICATIONS:  MEDICATIONS  (STANDING):  cefTRIAXone   IVPB 1000 milliGRAM(s) IV Intermittent every 24 hours  dextrose 5%. 1000 milliLiter(s) (50 mL/Hr) IV Continuous <Continuous>  dextrose 50% Injectable 12.5 Gram(s) IV Push once  dextrose 50% Injectable 25 Gram(s) IV Push once  dextrose 50% Injectable 25 Gram(s) IV Push once  finasteride 5 milliGRAM(s) Oral daily  gabapentin 100 milliGRAM(s) Oral at bedtime  heparin   Injectable 5000 Unit(s) SubCutaneous every 12 hours  indomethacin Suppository 100 milliGRAM(s) Rectal once  insulin lispro (HumaLOG) corrective regimen sliding scale   SubCutaneous three times a day before meals  insulin lispro (HumaLOG) corrective regimen sliding scale   SubCutaneous at bedtime  lactobacillus acidophilus 1 Tablet(s) Oral daily  melatonin 3 milliGRAM(s) Oral at bedtime  pantoprazole    Tablet 40 milliGRAM(s) Oral two times a day  saccharomyces boulardii 250 milliGRAM(s) Oral two times a day  sodium chloride 0.9%. 1000 milliLiter(s) (100 mL/Hr) IV Continuous <Continuous>  tamsulosin 0.4 milliGRAM(s) Oral at bedtime    MEDICATIONS  (PRN):  acetaminophen   Tablet .. 650 milliGRAM(s) Oral every 6 hours PRN Temp greater or equal to 38C (100.4F), Mild Pain (1 - 3)  dextrose 40% Gel 15 Gram(s) Oral once PRN Blood Glucose LESS THAN 70 milliGRAM(s)/deciliter  glucagon  Injectable 1 milliGRAM(s) IntraMuscular once PRN Glucose LESS THAN 70 milligrams/deciliter  mirtazapine 7.5 milliGRAM(s) Oral at bedtime PRN insomnia      Allergies    No Known Allergies    Intolerances        Vital Signs Last 24 Hrs  T(C): 36.8 (23 May 2020 09:38), Max: 37.2 (22 May 2020 20:00)  T(F): 98.2 (23 May 2020 09:38), Max: 99 (22 May 2020 20:00)  HR: 78 (23 May 2020 09:38) (69 - 90)  BP: 125/68 (23 May 2020 09:38) (119/72 - 137/68)  BP(mean): --  RR: 18 (23 May 2020 09:38) (18 - 18)  SpO2: 94% (23 May 2020 09:38) (92% - 95%)    05-22 @ 07:01  -  05-23 @ 07:00  --------------------------------------------------------  IN: 0 mL / OUT: 600 mL / NET: -600 mL    05-23 @ 07:01  -  05-23 @ 12:30  --------------------------------------------------------  IN: 1080 mL / OUT: 275 mL / NET: 805 mL        PHYSICAL EXAM:    General: Well developed; well nourished; in no acute distress  HEENT: MMM, conjunctiva pink and sclera less icteric.  Lungs: clear to auscultation bilaterally.  Cor: RRR S1, S2 only.  Gastrointestinal: Abdomen: Soft non-tender non-distended; Normal bowel sounds; No hepatosplenomegaly.  Extremities: no cyanosis, clubbing or edema.  Skin: Warm and dry. No obvious rash    LABS:  CBC Full  -  ( 23 May 2020 07:37 )  WBC Count : 13.45 K/uL  RBC Count : 3.31 M/uL  Hemoglobin : 9.6 g/dL  Hematocrit : 29.5 %  Platelet Count - Automated : 294 K/uL  Mean Cell Volume : 89.1 fl  Mean Cell Hemoglobin : 29.0 pg  Mean Cell Hemoglobin Concentration : 32.5 gm/dL  Auto Neutrophil # : x  Auto Lymphocyte # : x  Auto Monocyte # : x  Auto Eosinophil # : x  Auto Basophil # : x  Auto Neutrophil % : x  Auto Lymphocyte % : x  Auto Monocyte % : x  Auto Eosinophil % : x  Auto Basophil % : x    05-23    140  |  107  |  57.0<H>  ----------------------------<  135<H>  4.5   |  21.0<L>  |  1.45<H>    Ca    8.5<L>      23 May 2020 07:37    TPro  5.7<L>  /  Alb  2.2<L>  /  TBili  5.2<H>  /  DBili  x   /  AST  40<H>  /  ALT  45<H>  /  AlkPhos  351<H>  05-23                      RADIOLOGY & ADDITIONAL STUDIES (The following images were personally reviewed):

## 2020-05-24 LAB
ALBUMIN SERPL ELPH-MCNC: 2.2 G/DL — LOW (ref 3.3–5.2)
ALP SERPL-CCNC: 323 U/L — HIGH (ref 40–120)
ALT FLD-CCNC: 38 U/L — SIGNIFICANT CHANGE UP
ANION GAP SERPL CALC-SCNC: 11 MMOL/L — SIGNIFICANT CHANGE UP (ref 5–17)
AST SERPL-CCNC: 37 U/L — SIGNIFICANT CHANGE UP
BILIRUB SERPL-MCNC: 4.2 MG/DL — HIGH (ref 0.4–2)
BUN SERPL-MCNC: 54 MG/DL — HIGH (ref 8–20)
CALCIUM SERPL-MCNC: 8.8 MG/DL — SIGNIFICANT CHANGE UP (ref 8.6–10.2)
CHLORIDE SERPL-SCNC: 110 MMOL/L — HIGH (ref 98–107)
CO2 SERPL-SCNC: 19 MMOL/L — LOW (ref 22–29)
CREAT SERPL-MCNC: 1.34 MG/DL — HIGH (ref 0.5–1.3)
GLUCOSE BLDC GLUCOMTR-MCNC: 113 MG/DL — HIGH (ref 70–99)
GLUCOSE BLDC GLUCOMTR-MCNC: 128 MG/DL — HIGH (ref 70–99)
GLUCOSE BLDC GLUCOMTR-MCNC: 145 MG/DL — HIGH (ref 70–99)
GLUCOSE BLDC GLUCOMTR-MCNC: 155 MG/DL — HIGH (ref 70–99)
GLUCOSE SERPL-MCNC: 130 MG/DL — HIGH (ref 70–99)
HCT VFR BLD CALC: 31.2 % — LOW (ref 39–50)
HGB BLD-MCNC: 10.2 G/DL — LOW (ref 13–17)
MCHC RBC-ENTMCNC: 29.5 PG — SIGNIFICANT CHANGE UP (ref 27–34)
MCHC RBC-ENTMCNC: 32.7 GM/DL — SIGNIFICANT CHANGE UP (ref 32–36)
MCV RBC AUTO: 90.2 FL — SIGNIFICANT CHANGE UP (ref 80–100)
PLATELET # BLD AUTO: 311 K/UL — SIGNIFICANT CHANGE UP (ref 150–400)
POTASSIUM SERPL-MCNC: 4.9 MMOL/L — SIGNIFICANT CHANGE UP (ref 3.5–5.3)
POTASSIUM SERPL-SCNC: 4.9 MMOL/L — SIGNIFICANT CHANGE UP (ref 3.5–5.3)
PROT SERPL-MCNC: 5.5 G/DL — LOW (ref 6.6–8.7)
RBC # BLD: 3.46 M/UL — LOW (ref 4.2–5.8)
RBC # FLD: 16.2 % — HIGH (ref 10.3–14.5)
SODIUM SERPL-SCNC: 140 MMOL/L — SIGNIFICANT CHANGE UP (ref 135–145)
WBC # BLD: 13.96 K/UL — HIGH (ref 3.8–10.5)
WBC # FLD AUTO: 13.96 K/UL — HIGH (ref 3.8–10.5)

## 2020-05-24 PROCEDURE — 99233 SBSQ HOSP IP/OBS HIGH 50: CPT

## 2020-05-24 RX ADMIN — HEPARIN SODIUM 5000 UNIT(S): 5000 INJECTION INTRAVENOUS; SUBCUTANEOUS at 04:48

## 2020-05-24 RX ADMIN — PANTOPRAZOLE SODIUM 40 MILLIGRAM(S): 20 TABLET, DELAYED RELEASE ORAL at 17:08

## 2020-05-24 RX ADMIN — Medication 1 TABLET(S): at 12:11

## 2020-05-24 RX ADMIN — FINASTERIDE 5 MILLIGRAM(S): 5 TABLET, FILM COATED ORAL at 12:17

## 2020-05-24 RX ADMIN — TAMSULOSIN HYDROCHLORIDE 0.4 MILLIGRAM(S): 0.4 CAPSULE ORAL at 20:15

## 2020-05-24 RX ADMIN — Medication 250 MILLIGRAM(S): at 04:48

## 2020-05-24 RX ADMIN — Medication 3 MILLIGRAM(S): at 20:15

## 2020-05-24 RX ADMIN — CEFTRIAXONE 100 MILLIGRAM(S): 500 INJECTION, POWDER, FOR SOLUTION INTRAMUSCULAR; INTRAVENOUS at 23:19

## 2020-05-24 RX ADMIN — GABAPENTIN 100 MILLIGRAM(S): 400 CAPSULE ORAL at 20:15

## 2020-05-24 RX ADMIN — PANTOPRAZOLE SODIUM 40 MILLIGRAM(S): 20 TABLET, DELAYED RELEASE ORAL at 04:48

## 2020-05-24 RX ADMIN — HEPARIN SODIUM 5000 UNIT(S): 5000 INJECTION INTRAVENOUS; SUBCUTANEOUS at 17:08

## 2020-05-24 RX ADMIN — Medication 250 MILLIGRAM(S): at 17:08

## 2020-05-24 NOTE — PHYSICAL THERAPY INITIAL EVALUATION ADULT - ADDITIONAL COMMENTS
Pt. reports he lives with his wife who is at home and can supervise/minimally assist. Pt. has 7+7 AAYUSH with one rail. Pt. reports being modified independent with a RW prior to admission and not owning any other DME.

## 2020-05-24 NOTE — PROGRESS NOTE ADULT - SUBJECTIVE AND OBJECTIVE BOX
Pt seen and examined. Alert in NAD w/o c/o abdominal pain. Today's BR down to 4.2.     REVIEW OF SYSTEMS:  Constitutional: No fever, weight loss or fatigue  Cardiovascular: No chest pain, palpitations, dizziness or leg swelling  Gastrointestinal: As noted above. No abdominal or epigastric pain. No nausea, vomiting or hematemesis; No diarrhea or constipation. No melena or hematochezia.  Skin: No itching, burning, rashes or lesions       MEDICATIONS:  MEDICATIONS  (STANDING):  cefTRIAXone   IVPB 1000 milliGRAM(s) IV Intermittent every 24 hours  dextrose 5%. 1000 milliLiter(s) (50 mL/Hr) IV Continuous <Continuous>  dextrose 50% Injectable 12.5 Gram(s) IV Push once  dextrose 50% Injectable 25 Gram(s) IV Push once  dextrose 50% Injectable 25 Gram(s) IV Push once  finasteride 5 milliGRAM(s) Oral daily  gabapentin 100 milliGRAM(s) Oral at bedtime  heparin   Injectable 5000 Unit(s) SubCutaneous every 12 hours  indomethacin Suppository 100 milliGRAM(s) Rectal once  insulin lispro (HumaLOG) corrective regimen sliding scale   SubCutaneous three times a day before meals  insulin lispro (HumaLOG) corrective regimen sliding scale   SubCutaneous at bedtime  lactobacillus acidophilus 1 Tablet(s) Oral daily  melatonin 3 milliGRAM(s) Oral at bedtime  pantoprazole    Tablet 40 milliGRAM(s) Oral two times a day  saccharomyces boulardii 250 milliGRAM(s) Oral two times a day  sodium chloride 0.9%. 1000 milliLiter(s) (100 mL/Hr) IV Continuous <Continuous>  tamsulosin 0.4 milliGRAM(s) Oral at bedtime    MEDICATIONS  (PRN):  acetaminophen   Tablet .. 650 milliGRAM(s) Oral every 6 hours PRN Temp greater or equal to 38C (100.4F), Mild Pain (1 - 3)  dextrose 40% Gel 15 Gram(s) Oral once PRN Blood Glucose LESS THAN 70 milliGRAM(s)/deciliter  glucagon  Injectable 1 milliGRAM(s) IntraMuscular once PRN Glucose LESS THAN 70 milligrams/deciliter  mirtazapine 7.5 milliGRAM(s) Oral at bedtime PRN insomnia      Allergies    No Known Allergies    Intolerances    Magic cup tid (Unknown)      Vital Signs Last 24 Hrs  T(C): 37 (24 May 2020 08:30), Max: 37 (24 May 2020 08:30)  T(F): 98.6 (24 May 2020 08:30), Max: 98.6 (24 May 2020 08:30)  HR: 67 (24 May 2020 08:30) (66 - 78)  BP: 116/53 (24 May 2020 08:30) (116/53 - 125/68)  BP(mean): --  RR: 18 (24 May 2020 08:30) (18 - 19)  SpO2: 94% (24 May 2020 08:30) (94% - 94%)    05-23 @ 07:01  -  05-24 @ 07:00  --------------------------------------------------------  IN: 1420 mL / OUT: 650 mL / NET: 770 mL        PHYSICAL EXAM:    General: Well developed; well nourished; in no acute distress  HEENT: MMM, conjunctiva pink and sclera icteric.  Lungs: clear to auscultation bilaterally.  Cor: RRR S1, S2 only.  Gastrointestinal: Abdomen: Soft non-tender non-distended; Normal bowel sounds; No hepatosplenomegaly.  Extremities: no cyanosis, clubbing or edema.  Skin: Warm and dry. No obvious rash  Neuro: Pt. a + o x 3    LABS:  CBC Full  -  ( 24 May 2020 08:04 )  WBC Count : 13.96 K/uL  RBC Count : 3.46 M/uL  Hemoglobin : 10.2 g/dL  Hematocrit : 31.2 %  Platelet Count - Automated : 311 K/uL  Mean Cell Volume : 90.2 fl  Mean Cell Hemoglobin : 29.5 pg  Mean Cell Hemoglobin Concentration : 32.7 gm/dL  Auto Neutrophil # : x  Auto Lymphocyte # : x  Auto Monocyte # : x  Auto Eosinophil # : x  Auto Basophil # : x  Auto Neutrophil % : x  Auto Lymphocyte % : x  Auto Monocyte % : x  Auto Eosinophil % : x  Auto Basophil % : x    05-24    140  |  110<H>  |  54.0<H>  ----------------------------<  130<H>  4.9   |  19.0<L>  |  1.34<H>    Ca    8.8      24 May 2020 08:04    TPro  5.5<L>  /  Alb  2.2<L>  /  TBili  4.2<H>  /  DBili  x   /  AST  37  /  ALT  38  /  AlkPhos  323<H>  05-24                      RADIOLOGY & ADDITIONAL STUDIES (The following images were personally reviewed):

## 2020-05-24 NOTE — PROGRESS NOTE ADULT - PROBLEM SELECTOR PLAN 1
Status post stenting with decrease in BR. Possible duodenal +/or pancreatic malignancy. He appears stable for D/C from GI standpoint with OPT f/u with Dr. Dandre Dutta in 1 to 2 weeks. Thank you. Continue to trend LFT's weekly as OPT.

## 2020-05-24 NOTE — PROGRESS NOTE ADULT - ASSESSMENT
89 yo male with pmh of BPH, HTN, HLD, CKD3a, OA s/p L BORIS, distant scalp melanoma resection, was sent from pmd's office for further evaluation of painless jaundice. Noted significantly elevated lipase >3000, bili 9.5, . ctap with biliary duct dilatation due to mass effect from retroperitoneal lymphadenopathy. Findings compatible with either metastatic disease or lymphoma. No definite pancreatic mass identified. Admitted for further eval. MRCP complete, now s/p EUS/ERCP with noted nerotic duodenal ulcer, biopsies taken of heaped up margins, large lymphadenopathy around portal vein and pancreas, multiple LN around perigastric/celiac area (s/p FNA x4), distal bile duct stricture noted, stent placed within bile duct. Heme-Onc following.    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 No discrete head mass identified.   -numerous gall stones on sonogram, however no clinical signs of biliary colic  -Lipase downtrending  from >3000, Bili high but no clinical signs of pancreatitis, LFTs downtrending   -C/w ursodiol QD for pruritis  - tumor markers CEA, CA 19-9,  elevated  - Monitor LFTs  - heme-onc eval noted  - c/w PPI BID   - Follow up biopsy results  - d/c aspirin given necrotic ulcer    ABIGAIL on CKD3a likely from dehydration  - creatinine remains elevated  - Cr  trending down to base line.  baseline ~1.4  - likely from dehydration  - avoid nephrotoxic meds- Continue to monitor   - CT abdomen a/p 5/18 - b/l renal cysts - f/u outpt, no hydronephrosis     UTI  - grossly positive UA and +urinary freq  - C/w Rocephin day 5/7  - urine culture negative     HTN  - BP remains soft  - hold home dose losartan since BPs low  - prn hydralazine for SBP>160    Elevated troponin  -no active anginal symptoms, ekg without acute ischemic changes  -trend cardiac enzymes  -likely secondary to renal failure    Normocytic anemia  -Hb STABLE  -no active bleeding  -follow up o/p with pmd for anemia work up    HLD  - pt take ezetimibe at home. Unavailable at hospital.   - resume on discharge    BPH  -chronic, stable   -c/w proscar and flomax    DVT ppx:   -heparin 5000 units SQ BID        Dispo:  pending physical therapy    DW  Wife Kaylee [phone]  agreed with above plan. she thinks pt will be to better in [place] for physical deconditioning.  dw pt above care of plan. He agreed.

## 2020-05-24 NOTE — PHYSICAL THERAPY INITIAL EVALUATION ADULT - ASR EQUIP NEEDS DISCH PT EVAL
Called patient. Patient concerned because the first available appointment for wound care is 2/8/17 at [facility]. Patient states her dressing needs to be changed within the next 24 hours. Called [facility], spoke to Delfina who states order for home care was placed on 1/31/17 while inpatient, and somebody should be visiting in the next 24-48 hours.   Notified patient. Patient verbalized understanding.  Patient will call clinic with any questions or concerns.    rolling walker (5 inch wheels)

## 2020-05-24 NOTE — PROGRESS NOTE ADULT - SUBJECTIVE AND OBJECTIVE BOX
Patient is a 89y old  Male who presents with a chief complaint of obstructive jaundice with concern for malignancy (24 May 2020 08:55)    Pt seen and exam. Denies any abd pain,n/v/d,no fever,chill.  SUBJECTIVE / OVERNIGHT EVENTS: none per RN  REVIEW OF SYSTEMS: All systems are reviewed and found to be negative EXCEPT ABOVE    MEDICATIONS  (STANDING):  cefTRIAXone   IVPB 1000 milliGRAM(s) IV Intermittent every 24 hours  dextrose 5%. 1000 milliLiter(s) (50 mL/Hr) IV Continuous <Continuous>  dextrose 50% Injectable 12.5 Gram(s) IV Push once  dextrose 50% Injectable 25 Gram(s) IV Push once  dextrose 50% Injectable 25 Gram(s) IV Push once  finasteride 5 milliGRAM(s) Oral daily  gabapentin 100 milliGRAM(s) Oral at bedtime  heparin   Injectable 5000 Unit(s) SubCutaneous every 12 hours  indomethacin Suppository 100 milliGRAM(s) Rectal once  insulin lispro (HumaLOG) corrective regimen sliding scale   SubCutaneous three times a day before meals  insulin lispro (HumaLOG) corrective regimen sliding scale   SubCutaneous at bedtime  lactobacillus acidophilus 1 Tablet(s) Oral daily  melatonin 3 milliGRAM(s) Oral at bedtime  pantoprazole    Tablet 40 milliGRAM(s) Oral two times a day  saccharomyces boulardii 250 milliGRAM(s) Oral two times a day  sodium chloride 0.9%. 1000 milliLiter(s) (100 mL/Hr) IV Continuous <Continuous>  tamsulosin 0.4 milliGRAM(s) Oral at bedtime    MEDICATIONS  (PRN):  acetaminophen   Tablet .. 650 milliGRAM(s) Oral every 6 hours PRN Temp greater or equal to 38C (100.4F), Mild Pain (1 - 3)  dextrose 40% Gel 15 Gram(s) Oral once PRN Blood Glucose LESS THAN 70 milliGRAM(s)/deciliter  glucagon  Injectable 1 milliGRAM(s) IntraMuscular once PRN Glucose LESS THAN 70 milligrams/deciliter  mirtazapine 7.5 milliGRAM(s) Oral at bedtime PRN insomnia      CAPILLARY BLOOD GLUCOSE      POCT Blood Glucose.: 145 mg/dL (24 May 2020 12:10)  POCT Blood Glucose.: 113 mg/dL (24 May 2020 07:41)  POCT Blood Glucose.: 146 mg/dL (23 May 2020 21:11)  POCT Blood Glucose.: 129 mg/dL (23 May 2020 16:28)    I&O's Summary    23 May 2020 07:01  -  24 May 2020 07:00  --------------------------------------------------------  IN: 1420 mL / OUT: 650 mL / NET: 770 mL    24 May 2020 07:01  -  24 May 2020 12:59  --------------------------------------------------------  IN: 500 mL / OUT: 0 mL / NET: 500 mL        PHYSICAL EXAM:  Vital Signs Last 24 Hrs  T(C): 37 (24 May 2020 08:30), Max: 37 (24 May 2020 08:30)  T(F): 98.6 (24 May 2020 08:30), Max: 98.6 (24 May 2020 08:30)  HR: 67 (24 May 2020 08:30) (66 - 67)  BP: 116/53 (24 May 2020 08:30) (116/53 - 118/61)  BP(mean): --  RR: 18 (24 May 2020 08:30) (18 - 19)  SpO2: 94% (24 May 2020 08:30) (94% - 94%)    CONSTITUTIONAL: NAD, well-developed,    ENMT: Moist oral mucosa,   NECK: Supple, no palpable masses; no thyromegaly  RESPIRATORY: Normal respiratory effort; lungs are clear to auscultation bilaterally  CARDIOVASCULAR: Regular rate and rhythm, normal S1 and S2, no murmur/rub/gallop;   EXTS: No lower extremity edema; Peripheral pulses are 2+ bilaterally  ABDOMEN: Nontender to palpation, normoactive bowel sounds, no rebound/guarding; No hepatosplenomegaly  MUSCLOSKELETAL: no clubbing or cyanosis of digits; no joint swelling or tenderness to palpation  PSYCH: affect appropriate  NEUROLOGY: A+O to person, place, and NOT TO time; no gross sensory deficits;       LABS:                        10.2   13.96 )-----------( 311      ( 24 May 2020 08:04 )             31.2     05-24    140  |  110<H>  |  54.0<H>  ----------------------------<  130<H>  4.9   |  19.0<L>  |  1.34<H>    Ca    8.8      24 May 2020 08:04    TPro  5.5<L>  /  Alb  2.2<L>  /  TBili  4.2<H>  /  DBili  x   /  AST  37  /  ALT  38  /  AlkPhos  323<H>  05-24                RADIOLOGY & ADDITIONAL TESTS:  Results Reviewed:   Imaging Personally Reviewed:  Electrocardiogram Personally Reviewed:    COORDINATION OF CARE:  Care Discussed with Consultants/Other Providers [Y/N]:  Prior or Outpatient Records Reviewed [Y/N]:

## 2020-05-25 LAB
ALBUMIN SERPL ELPH-MCNC: 2.2 G/DL — LOW (ref 3.3–5.2)
ALP SERPL-CCNC: 305 U/L — HIGH (ref 40–120)
ALT FLD-CCNC: 36 U/L — SIGNIFICANT CHANGE UP
ANION GAP SERPL CALC-SCNC: 11 MMOL/L — SIGNIFICANT CHANGE UP (ref 5–17)
AST SERPL-CCNC: 38 U/L — SIGNIFICANT CHANGE UP
BILIRUB SERPL-MCNC: 4.2 MG/DL — HIGH (ref 0.4–2)
BUN SERPL-MCNC: 54 MG/DL — HIGH (ref 8–20)
CALCIUM SERPL-MCNC: 9 MG/DL — SIGNIFICANT CHANGE UP (ref 8.6–10.2)
CHLORIDE SERPL-SCNC: 109 MMOL/L — HIGH (ref 98–107)
CO2 SERPL-SCNC: 20 MMOL/L — LOW (ref 22–29)
CREAT SERPL-MCNC: 1.37 MG/DL — HIGH (ref 0.5–1.3)
GLUCOSE BLDC GLUCOMTR-MCNC: 90 MG/DL — SIGNIFICANT CHANGE UP (ref 70–99)
GLUCOSE SERPL-MCNC: 95 MG/DL — SIGNIFICANT CHANGE UP (ref 70–99)
HCT VFR BLD CALC: 32.3 % — LOW (ref 39–50)
HGB BLD-MCNC: 10.4 G/DL — LOW (ref 13–17)
MCHC RBC-ENTMCNC: 29.4 PG — SIGNIFICANT CHANGE UP (ref 27–34)
MCHC RBC-ENTMCNC: 32.2 GM/DL — SIGNIFICANT CHANGE UP (ref 32–36)
MCV RBC AUTO: 91.2 FL — SIGNIFICANT CHANGE UP (ref 80–100)
PLATELET # BLD AUTO: 311 K/UL — SIGNIFICANT CHANGE UP (ref 150–400)
POTASSIUM SERPL-MCNC: 5.1 MMOL/L — SIGNIFICANT CHANGE UP (ref 3.5–5.3)
POTASSIUM SERPL-SCNC: 5.1 MMOL/L — SIGNIFICANT CHANGE UP (ref 3.5–5.3)
PROT SERPL-MCNC: 6 G/DL — LOW (ref 6.6–8.7)
RBC # BLD: 3.54 M/UL — LOW (ref 4.2–5.8)
RBC # FLD: 16.2 % — HIGH (ref 10.3–14.5)
SODIUM SERPL-SCNC: 140 MMOL/L — SIGNIFICANT CHANGE UP (ref 135–145)
WBC # BLD: 15.28 K/UL — HIGH (ref 3.8–10.5)
WBC # FLD AUTO: 15.28 K/UL — HIGH (ref 3.8–10.5)

## 2020-05-25 PROCEDURE — 99233 SBSQ HOSP IP/OBS HIGH 50: CPT

## 2020-05-25 PROCEDURE — 99232 SBSQ HOSP IP/OBS MODERATE 35: CPT

## 2020-05-25 RX ORDER — MIRTAZAPINE 45 MG/1
1 TABLET, ORALLY DISINTEGRATING ORAL
Qty: 0 | Refills: 0 | DISCHARGE
Start: 2020-05-25

## 2020-05-25 RX ORDER — LOSARTAN POTASSIUM 100 MG/1
1 TABLET, FILM COATED ORAL
Qty: 0 | Refills: 0 | DISCHARGE

## 2020-05-25 RX ORDER — PANTOPRAZOLE SODIUM 20 MG/1
1 TABLET, DELAYED RELEASE ORAL
Qty: 0 | Refills: 0 | DISCHARGE
Start: 2020-05-25

## 2020-05-25 RX ORDER — HEPARIN SODIUM 5000 [USP'U]/ML
5000 INJECTION INTRAVENOUS; SUBCUTANEOUS
Qty: 0 | Refills: 0 | DISCHARGE
Start: 2020-05-25

## 2020-05-25 RX ORDER — TAMSULOSIN HYDROCHLORIDE 0.4 MG/1
1 CAPSULE ORAL
Qty: 0 | Refills: 0 | DISCHARGE
Start: 2020-05-25

## 2020-05-25 RX ORDER — FINASTERIDE 5 MG/1
1 TABLET, FILM COATED ORAL
Qty: 0 | Refills: 0 | DISCHARGE

## 2020-05-25 RX ORDER — ACETAMINOPHEN 500 MG
2 TABLET ORAL
Qty: 0 | Refills: 0 | DISCHARGE
Start: 2020-05-25

## 2020-05-25 RX ORDER — GABAPENTIN 400 MG/1
1 CAPSULE ORAL
Qty: 0 | Refills: 0 | DISCHARGE
Start: 2020-05-25

## 2020-05-25 RX ORDER — ALFUZOSIN HYDROCHLORIDE 10 MG/1
1 TABLET, EXTENDED RELEASE ORAL
Qty: 0 | Refills: 0 | DISCHARGE

## 2020-05-25 RX ORDER — POTASSIUM CHLORIDE 20 MEQ
1 PACKET (EA) ORAL
Qty: 0 | Refills: 0 | DISCHARGE

## 2020-05-25 RX ADMIN — Medication 250 MILLIGRAM(S): at 17:04

## 2020-05-25 RX ADMIN — HEPARIN SODIUM 5000 UNIT(S): 5000 INJECTION INTRAVENOUS; SUBCUTANEOUS at 04:20

## 2020-05-25 RX ADMIN — PANTOPRAZOLE SODIUM 40 MILLIGRAM(S): 20 TABLET, DELAYED RELEASE ORAL at 04:20

## 2020-05-25 RX ADMIN — HEPARIN SODIUM 5000 UNIT(S): 5000 INJECTION INTRAVENOUS; SUBCUTANEOUS at 17:04

## 2020-05-25 RX ADMIN — FINASTERIDE 5 MILLIGRAM(S): 5 TABLET, FILM COATED ORAL at 12:13

## 2020-05-25 RX ADMIN — Medication 1 TABLET(S): at 12:13

## 2020-05-25 RX ADMIN — Medication 3 MILLIGRAM(S): at 21:42

## 2020-05-25 RX ADMIN — GABAPENTIN 100 MILLIGRAM(S): 400 CAPSULE ORAL at 21:42

## 2020-05-25 RX ADMIN — Medication 250 MILLIGRAM(S): at 04:22

## 2020-05-25 RX ADMIN — PANTOPRAZOLE SODIUM 40 MILLIGRAM(S): 20 TABLET, DELAYED RELEASE ORAL at 17:04

## 2020-05-25 RX ADMIN — TAMSULOSIN HYDROCHLORIDE 0.4 MILLIGRAM(S): 0.4 CAPSULE ORAL at 21:42

## 2020-05-25 NOTE — PROGRESS NOTE ADULT - ASSESSMENT
89 yo male with pmh of BPH, HTN, HLD, CKD3a, OA s/p L BORIS, distant scalp melanoma resection, was sent from pmd's office for further evaluation of painless jaundice. Noted significantly elevated lipase >3000, bili 9.5, . ctap with biliary duct dilatation due to mass effect from retroperitoneal lymphadenopathy. Findings compatible with either metastatic disease or lymphoma. No definite pancreatic mass identified. Admitted for further eval. MRCP complete, now s/p EUS/ERCP with noted nerotic duodenal ulcer, biopsies taken of heaped up margins, large lymphadenopathy around portal vein and pancreas, multiple LN around perigastric/celiac area (s/p FNA x4), distal bile duct stricture noted, stent placed within bile duct. Heme-Onc following.    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 No discrete head mass identified.   -numerous gall stones on sonogram, however no clinical signs of biliary colic  -Lipase downtrending  from >3000, Bili high but no clinical signs of pancreatitis, LFTs downtrending   -C/w ursodiol QD for pruritis  - tumor markers CEA, CA 19-9,  elevated  - Monitor LFTs  - heme-onc eval noted  - c/w PPI BID   - Follow up biopsy results  - d/c aspirin given necrotic ulcer    ABIGAIL on CKD3a likely from dehydration  - creatinine remains elevated  - Cr  trending down to base line.  baseline ~1.4  - likely from dehydration  - avoid nephrotoxic meds- Continue to monitor   - CT abdomen a/p 5/18 - b/l renal cysts - f/u outpt, no hydronephrosis     UTI  - grossly positive UA and +urinary freq  - C/w Rocephin day 5/7  - urine culture negative     HTN  - BP remains soft  - hold home dose losartan since BPs low  - prn hydralazine for SBP>160    Elevated troponin  -no active anginal symptoms, ekg without acute ischemic changes  -trend cardiac enzymes  -likely secondary to renal failure    Normocytic anemia  -Hb STABLE  -no active bleeding  -follow up o/p with pmd for anemia work up    HLD  - pt take ezetimibe at home. Unavailable at hospital.   - resume on discharge    BPH  -chronic, stable   -c/w proscar and flomax    DVT ppx:   -heparin 5000 units SQ BID        Dispo:  [facility]    DW  Wife Kaylee [phone]  agreed with above plan. she thinks pt will be to better in [facility] for physical deconditioning.  dw pt above care of plan. He agreed.

## 2020-05-25 NOTE — PROGRESS NOTE ADULT - SUBJECTIVE AND OBJECTIVE BOX
Pt seen and examined. No c/o abdominal pain presently. Today's labs pending.     REVIEW OF SYSTEMS:  Constitutional: No fever, weight loss or fatigue  Cardiovascular: No chest pain, palpitations, dizziness or leg swelling  Gastrointestinal: As noted above. No abdominal or epigastric pain. No nausea, vomiting or hematemesis; No diarrhea or constipation. No melena or hematochezia.  Skin: No itching, burning, rashes or lesions       MEDICATIONS:  MEDICATIONS  (STANDING):  dextrose 5%. 1000 milliLiter(s) (50 mL/Hr) IV Continuous <Continuous>  dextrose 50% Injectable 12.5 Gram(s) IV Push once  dextrose 50% Injectable 25 Gram(s) IV Push once  dextrose 50% Injectable 25 Gram(s) IV Push once  finasteride 5 milliGRAM(s) Oral daily  gabapentin 100 milliGRAM(s) Oral at bedtime  heparin   Injectable 5000 Unit(s) SubCutaneous every 12 hours  indomethacin Suppository 100 milliGRAM(s) Rectal once  insulin lispro (HumaLOG) corrective regimen sliding scale   SubCutaneous three times a day before meals  insulin lispro (HumaLOG) corrective regimen sliding scale   SubCutaneous at bedtime  lactobacillus acidophilus 1 Tablet(s) Oral daily  melatonin 3 milliGRAM(s) Oral at bedtime  pantoprazole    Tablet 40 milliGRAM(s) Oral two times a day  saccharomyces boulardii 250 milliGRAM(s) Oral two times a day  sodium chloride 0.9%. 1000 milliLiter(s) (100 mL/Hr) IV Continuous <Continuous>  tamsulosin 0.4 milliGRAM(s) Oral at bedtime    MEDICATIONS  (PRN):  acetaminophen   Tablet .. 650 milliGRAM(s) Oral every 6 hours PRN Temp greater or equal to 38C (100.4F), Mild Pain (1 - 3)  dextrose 40% Gel 15 Gram(s) Oral once PRN Blood Glucose LESS THAN 70 milliGRAM(s)/deciliter  glucagon  Injectable 1 milliGRAM(s) IntraMuscular once PRN Glucose LESS THAN 70 milligrams/deciliter  mirtazapine 7.5 milliGRAM(s) Oral at bedtime PRN insomnia      Allergies    No Known Allergies    Intolerances    Magic cup tid (Unknown)      Vital Signs Last 24 Hrs  T(C): 36.4 (25 May 2020 07:10), Max: 36.6 (24 May 2020 19:30)  T(F): 97.6 (25 May 2020 07:10), Max: 97.9 (24 May 2020 19:30)  HR: 64 (25 May 2020 07:10) (64 - 75)  BP: 133/70 (25 May 2020 07:10) (126/51 - 133/70)  BP(mean): --  RR: 19 (25 May 2020 07:10) (18 - 19)  SpO2: 96% (25 May 2020 07:10) (95% - 96%)    05-24 @ 07:01  -  05-25 @ 07:00  --------------------------------------------------------  IN: 500 mL / OUT: 0 mL / NET: 500 mL        PHYSICAL EXAM:    General: Well developed; well nourished; in no acute distress  HEENT: MMM, conjunctiva pink and sclera less icteric.  Lungs: clear to auscultation bilaterally.  Cor: RRR S1, S2 only.  Gastrointestinal: Abdomen: Soft non-tender non-distended; Normal bowel sounds; No hepatosplenomegaly  Extremities: no cyanosis, clubbing or edema.  Skin: Warm and dry. No obvious rash  Neuro: Pt. a + o x 3    LABS:  CBC Full  -  ( 25 May 2020 08:36 )  WBC Count : 15.28 K/uL  RBC Count : 3.54 M/uL  Hemoglobin : 10.4 g/dL  Hematocrit : 32.3 %  Platelet Count - Automated : 311 K/uL  Mean Cell Volume : 91.2 fl  Mean Cell Hemoglobin : 29.4 pg  Mean Cell Hemoglobin Concentration : 32.2 gm/dL  Auto Neutrophil # : x  Auto Lymphocyte # : x  Auto Monocyte # : x  Auto Eosinophil # : x  Auto Basophil # : x  Auto Neutrophil % : x  Auto Lymphocyte % : x  Auto Monocyte % : x  Auto Eosinophil % : x  Auto Basophil % : x    05-24    140  |  110<H>  |  54.0<H>  ----------------------------<  130<H>  4.9   |  19.0<L>  |  1.34<H>    Ca    8.8      24 May 2020 08:04    TPro  5.5<L>  /  Alb  2.2<L>  /  TBili  4.2<H>  /  DBili  x   /  AST  37  /  ALT  38  /  AlkPhos  323<H>  05-24                      RADIOLOGY & ADDITIONAL STUDIES (The following images were personally reviewed):

## 2020-05-25 NOTE — PROGRESS NOTE ADULT - SUBJECTIVE AND OBJECTIVE BOX
Patient is a 89y old  Male who presents with a chief complaint of obstructive jaundice with concern for malignancy (25 May 2020 09:12)  pt seen and exam. Pt states he is feeling well.no abd pain,sob,cp ,n/v/d.    SUBJECTIVE / OVERNIGHT EVENTS: none  REVIEW OF SYSTEMS: All systems are reviewed and found to be negative except above    MEDICATIONS  (STANDING):  dextrose 5%. 1000 milliLiter(s) (50 mL/Hr) IV Continuous <Continuous>  dextrose 50% Injectable 12.5 Gram(s) IV Push once  dextrose 50% Injectable 25 Gram(s) IV Push once  dextrose 50% Injectable 25 Gram(s) IV Push once  finasteride 5 milliGRAM(s) Oral daily  gabapentin 100 milliGRAM(s) Oral at bedtime  heparin   Injectable 5000 Unit(s) SubCutaneous every 12 hours  indomethacin Suppository 100 milliGRAM(s) Rectal once  insulin lispro (HumaLOG) corrective regimen sliding scale   SubCutaneous three times a day before meals  insulin lispro (HumaLOG) corrective regimen sliding scale   SubCutaneous at bedtime  lactobacillus acidophilus 1 Tablet(s) Oral daily  melatonin 3 milliGRAM(s) Oral at bedtime  pantoprazole    Tablet 40 milliGRAM(s) Oral two times a day  saccharomyces boulardii 250 milliGRAM(s) Oral two times a day  sodium chloride 0.9%. 1000 milliLiter(s) (100 mL/Hr) IV Continuous <Continuous>  tamsulosin 0.4 milliGRAM(s) Oral at bedtime    MEDICATIONS  (PRN):  acetaminophen   Tablet .. 650 milliGRAM(s) Oral every 6 hours PRN Temp greater or equal to 38C (100.4F), Mild Pain (1 - 3)  dextrose 40% Gel 15 Gram(s) Oral once PRN Blood Glucose LESS THAN 70 milliGRAM(s)/deciliter  glucagon  Injectable 1 milliGRAM(s) IntraMuscular once PRN Glucose LESS THAN 70 milligrams/deciliter  mirtazapine 7.5 milliGRAM(s) Oral at bedtime PRN insomnia      CAPILLARY BLOOD GLUCOSE      POCT Blood Glucose.: 90 mg/dL (25 May 2020 07:48)  POCT Blood Glucose.: 155 mg/dL (24 May 2020 20:12)  POCT Blood Glucose.: 128 mg/dL (24 May 2020 17:04)  POCT Blood Glucose.: 145 mg/dL (24 May 2020 12:10)    I&O's Summary    24 May 2020 07:01  -  25 May 2020 07:00  --------------------------------------------------------  IN: 500 mL / OUT: 0 mL / NET: 500 mL        PHYSICAL EXAM:  Vital Signs Last 24 Hrs  T(C): 36.4 (25 May 2020 07:10), Max: 36.6 (24 May 2020 19:30)  T(F): 97.6 (25 May 2020 07:10), Max: 97.9 (24 May 2020 19:30)  HR: 64 (25 May 2020 07:10) (64 - 75)  BP: 133/70 (25 May 2020 07:10) (126/51 - 133/70)  BP(mean): --  RR: 19 (25 May 2020 07:10) (18 - 19)  SpO2: 96% (25 May 2020 07:10) (95% - 96%)    CONSTITUTIONAL: NAD,   EYES: PERRLA; icteric conjunctiva and sclera clear  ENMT: Moist oral mucosa,   NECK: Supple, no palpable masses; no thyromegaly  RESPIRATORY: Normal respiratory effort; lungs are clear to auscultation bilaterally  CARDIOVASCULAR: Regular rate and rhythm, normal S1 and S2, no murmur  EXTS: No lower extremity edema; Peripheral pulses are 2+ bilaterally  ABDOMEN: Nontender to palpation, normoactive bowel sounds, no rebound/guarding; No hepatosplenomegaly  MUSCLOSKELETAL:  no clubbing or cyanosis of digits; no joint swelling or tenderness to palpation  PSYCH: affect appropriate  NEUROLOGY: A+O to person, place, and time;  no gross sensory deficits;       LABS:                        10.4   15.28 )-----------( 311      ( 25 May 2020 08:36 )             32.3     05-25    140  |  109<H>  |  54.0<H>  ----------------------------<  95  5.1   |  20.0<L>  |  1.37<H>    Ca    9.0      25 May 2020 08:36    TPro  6.0<L>  /  Alb  2.2<L>  /  TBili  4.2<H>  /  DBili  x   /  AST  38  /  ALT  36  /  AlkPhos  305<H>  05-25                RADIOLOGY & ADDITIONAL TESTS:  Results Reviewed:   Imaging Personally Reviewed:  Electrocardiogram Personally Reviewed:    COORDINATION OF CARE:  Care Discussed with Consultants/Other Providers [Y/N]:  Prior or Outpatient Records Reviewed [Y/N]:

## 2020-05-25 NOTE — PROGRESS NOTE ADULT - PROBLEM SELECTOR PLAN 1
If today's BR continues to drop he would be stable for D/c from GI POV with OPT f/u with Dr. Dandre Dutta in 1 to 2 weeks while trending LFT's weekly as OPT. Thank you.

## 2020-05-26 ENCOUNTER — TRANSCRIPTION ENCOUNTER (OUTPATIENT)
Age: 85
End: 2020-05-26

## 2020-05-26 VITALS
RESPIRATION RATE: 18 BRPM | SYSTOLIC BLOOD PRESSURE: 112 MMHG | OXYGEN SATURATION: 94 % | DIASTOLIC BLOOD PRESSURE: 63 MMHG | TEMPERATURE: 98 F | HEART RATE: 62 BPM

## 2020-05-26 LAB
ALBUMIN SERPL ELPH-MCNC: 2.4 G/DL — LOW (ref 3.3–5.2)
ALP SERPL-CCNC: 300 U/L — HIGH (ref 40–120)
ALT FLD-CCNC: 37 U/L — SIGNIFICANT CHANGE UP
ANION GAP SERPL CALC-SCNC: 14 MMOL/L — SIGNIFICANT CHANGE UP (ref 5–17)
AST SERPL-CCNC: 43 U/L — HIGH
BILIRUB SERPL-MCNC: 3.5 MG/DL — HIGH (ref 0.4–2)
BUN SERPL-MCNC: 60 MG/DL — HIGH (ref 8–20)
CALCIUM SERPL-MCNC: 8.9 MG/DL — SIGNIFICANT CHANGE UP (ref 8.6–10.2)
CHLORIDE SERPL-SCNC: 106 MMOL/L — SIGNIFICANT CHANGE UP (ref 98–107)
CO2 SERPL-SCNC: 21 MMOL/L — LOW (ref 22–29)
CREAT SERPL-MCNC: 1.35 MG/DL — HIGH (ref 0.5–1.3)
GLUCOSE SERPL-MCNC: 111 MG/DL — HIGH (ref 70–99)
HCT VFR BLD CALC: 32.2 % — LOW (ref 39–50)
HGB BLD-MCNC: 10.3 G/DL — LOW (ref 13–17)
MCHC RBC-ENTMCNC: 29.2 PG — SIGNIFICANT CHANGE UP (ref 27–34)
MCHC RBC-ENTMCNC: 32 GM/DL — SIGNIFICANT CHANGE UP (ref 32–36)
MCV RBC AUTO: 91.2 FL — SIGNIFICANT CHANGE UP (ref 80–100)
PLATELET # BLD AUTO: 299 K/UL — SIGNIFICANT CHANGE UP (ref 150–400)
POTASSIUM SERPL-MCNC: 5.3 MMOL/L — SIGNIFICANT CHANGE UP (ref 3.5–5.3)
POTASSIUM SERPL-SCNC: 5.3 MMOL/L — SIGNIFICANT CHANGE UP (ref 3.5–5.3)
PROT SERPL-MCNC: 6.1 G/DL — LOW (ref 6.6–8.7)
RBC # BLD: 3.53 M/UL — LOW (ref 4.2–5.8)
RBC # FLD: 16.1 % — HIGH (ref 10.3–14.5)
SODIUM SERPL-SCNC: 140 MMOL/L — SIGNIFICANT CHANGE UP (ref 135–145)
WBC # BLD: 14.08 K/UL — HIGH (ref 3.8–10.5)
WBC # FLD AUTO: 14.08 K/UL — HIGH (ref 3.8–10.5)

## 2020-05-26 PROCEDURE — 85730 THROMBOPLASTIN TIME PARTIAL: CPT

## 2020-05-26 PROCEDURE — 85610 PROTHROMBIN TIME: CPT

## 2020-05-26 PROCEDURE — 83880 ASSAY OF NATRIURETIC PEPTIDE: CPT

## 2020-05-26 PROCEDURE — 80074 ACUTE HEPATITIS PANEL: CPT

## 2020-05-26 PROCEDURE — 87040 BLOOD CULTURE FOR BACTERIA: CPT

## 2020-05-26 PROCEDURE — 87635 SARS-COV-2 COVID-19 AMP PRB: CPT

## 2020-05-26 PROCEDURE — 83690 ASSAY OF LIPASE: CPT

## 2020-05-26 PROCEDURE — 86301 IMMUNOASSAY TUMOR CA 19-9: CPT

## 2020-05-26 PROCEDURE — 88305 TISSUE EXAM BY PATHOLOGIST: CPT

## 2020-05-26 PROCEDURE — 83735 ASSAY OF MAGNESIUM: CPT

## 2020-05-26 PROCEDURE — 99232 SBSQ HOSP IP/OBS MODERATE 35: CPT

## 2020-05-26 PROCEDURE — 83605 ASSAY OF LACTIC ACID: CPT

## 2020-05-26 PROCEDURE — 74176 CT ABD & PELVIS W/O CONTRAST: CPT

## 2020-05-26 PROCEDURE — 86304 IMMUNOASSAY TUMOR CA 125: CPT

## 2020-05-26 PROCEDURE — 80048 BASIC METABOLIC PNL TOTAL CA: CPT

## 2020-05-26 PROCEDURE — 86850 RBC ANTIBODY SCREEN: CPT

## 2020-05-26 PROCEDURE — 82550 ASSAY OF CK (CPK): CPT

## 2020-05-26 PROCEDURE — 82248 BILIRUBIN DIRECT: CPT

## 2020-05-26 PROCEDURE — 93306 TTE W/DOPPLER COMPLETE: CPT

## 2020-05-26 PROCEDURE — 93005 ELECTROCARDIOGRAM TRACING: CPT

## 2020-05-26 PROCEDURE — 87086 URINE CULTURE/COLONY COUNT: CPT

## 2020-05-26 PROCEDURE — 80076 HEPATIC FUNCTION PANEL: CPT

## 2020-05-26 PROCEDURE — 84484 ASSAY OF TROPONIN QUANT: CPT

## 2020-05-26 PROCEDURE — 82150 ASSAY OF AMYLASE: CPT

## 2020-05-26 PROCEDURE — 85027 COMPLETE CBC AUTOMATED: CPT

## 2020-05-26 PROCEDURE — 99285 EMERGENCY DEPT VISIT HI MDM: CPT | Mod: 25

## 2020-05-26 PROCEDURE — 76700 US EXAM ABDOM COMPLETE: CPT

## 2020-05-26 PROCEDURE — 82962 GLUCOSE BLOOD TEST: CPT

## 2020-05-26 PROCEDURE — 86901 BLOOD TYPING SEROLOGIC RH(D): CPT

## 2020-05-26 PROCEDURE — 74330 X-RAY BILE/PANC ENDOSCOPY: CPT

## 2020-05-26 PROCEDURE — C1773: CPT

## 2020-05-26 PROCEDURE — 36415 COLL VENOUS BLD VENIPUNCTURE: CPT

## 2020-05-26 PROCEDURE — 88342 IMHCHEM/IMCYTCHM 1ST ANTB: CPT

## 2020-05-26 PROCEDURE — 80053 COMPREHEN METABOLIC PANEL: CPT

## 2020-05-26 PROCEDURE — 74181 MRI ABDOMEN W/O CONTRAST: CPT

## 2020-05-26 PROCEDURE — 82378 CARCINOEMBRYONIC ANTIGEN: CPT

## 2020-05-26 PROCEDURE — 97163 PT EVAL HIGH COMPLEX 45 MIN: CPT

## 2020-05-26 PROCEDURE — 96360 HYDRATION IV INFUSION INIT: CPT

## 2020-05-26 PROCEDURE — 86900 BLOOD TYPING SEROLOGIC ABO: CPT

## 2020-05-26 PROCEDURE — 71045 X-RAY EXAM CHEST 1 VIEW: CPT

## 2020-05-26 PROCEDURE — 81001 URINALYSIS AUTO W/SCOPE: CPT

## 2020-05-26 PROCEDURE — 96361 HYDRATE IV INFUSION ADD-ON: CPT

## 2020-05-26 RX ADMIN — HEPARIN SODIUM 5000 UNIT(S): 5000 INJECTION INTRAVENOUS; SUBCUTANEOUS at 05:18

## 2020-05-26 RX ADMIN — Medication 1 TABLET(S): at 12:27

## 2020-05-26 RX ADMIN — FINASTERIDE 5 MILLIGRAM(S): 5 TABLET, FILM COATED ORAL at 12:27

## 2020-05-26 RX ADMIN — Medication 250 MILLIGRAM(S): at 05:18

## 2020-05-26 RX ADMIN — PANTOPRAZOLE SODIUM 40 MILLIGRAM(S): 20 TABLET, DELAYED RELEASE ORAL at 05:18

## 2020-05-26 NOTE — DISCHARGE NOTE NURSING/CASE MANAGEMENT/SOCIAL WORK - PATIENT PORTAL LINK FT
You can access the FollowMyHealth Patient Portal offered by [facility] by registering at the following website: [email or web address]. By joining [email or web address]’s FollowMyHealth portal, you will also be able to view your health information using other applications (apps) compatible with our system.

## 2020-05-26 NOTE — PROGRESS NOTE ADULT - REASON FOR ADMISSION
obstructive jaundice with concern for malignancy

## 2020-05-26 NOTE — PROGRESS NOTE ADULT - ASSESSMENT
88 yo male with pmh of BPH, HTN, HLD, CKD3a, OA s/p L BORIS, distant scalp melanoma resection, was sent from pmd's office for further evaluation of painless jaundice. Noted significantly elevated lipase >3000, bili 9.5, . ctap with biliary duct dilatation due to mass effect from retroperitoneal lymphadenopathy. Findings compatible with either metastatic disease or lymphoma. No definite pancreatic mass identified. Admitted for further eval. MRCP complete, now s/p EUS/ERCP with noted nerotic duodenal ulcer, biopsies taken of heaped up margins, large lymphadenopathy around portal vein and pancreas, multiple LN around perigastric/celiac area (s/p FNA x4), distal bile duct stricture noted, stent placed within bile duct. Heme-Onc following.    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 No discrete head mass identified.   -numerous gall stones on sonogram, however no clinical signs of biliary colic  -Lipase downtrending  from >3000, Bili high but no clinical signs of pancreatitis, LFTs downtrending   -C/w ursodiol QD for pruritis  - tumor markers CEA, CA 19-9,  elevated  - Monitor LFTs  - heme-onc eval noted  - c/w PPI BID   - Follow up biopsy results with hem /onc out patient  - d/c aspirin given necrotic ulcer    ABIGAIL on CKD3a likely from dehydration  - creatinine remains elevated  - Cr  trending down to base line.  baseline ~1.4  - likely from dehydration  - avoid nephrotoxic meds- Continue to monitor   - CT abdomen a/p 5/18 - b/l renal cysts - f/u outpt, no hydronephrosis     UTI  - grossly positive UA and +urinary freq  - C/w Rocephin day 5/7  - urine culture negative     HTN  - BP remains soft  - hold home dose losartan since BPs low  - prn hydralazine for SBP>160    Elevated troponin  -no active anginal symptoms, ekg without acute ischemic changes  -trend cardiac enzymes  -likely secondary to renal failure    Normocytic anemia  -Hb STABLE  -no active bleeding  -follow up o/p with pmd for anemia work up    HLD  - pt take ezetimibe at home. Unavailable at hospital.   - resume on discharge    BPH  -chronic, stable   -c/w proscar and flomax    DVT ppx:   -heparin 5000 units SQ BID        Dispo:  [facility] ARCHIBALD  Wife Kaylee [phone]  agreed with above plan. she agreed. Discussed about palliative /hospice care.Wife states she will  think about it. f/u hem/onc out patient-f/u bx result.  jess pt above care of plan. He agreed.

## 2020-05-26 NOTE — PROGRESS NOTE ADULT - SUBJECTIVE AND OBJECTIVE BOX
Patient is a 89y old  Male who presents with a chief complaint of obstructive jaundice with concern for malignancy (25 May 2020 11:55)  Pt seen and exam. no acute issues except fatigue. tolerating po diet well.  NO ACUTES ISSUES.    SUBJECTIVE / OVERNIGHT EVENTS: None  REVIEW OF SYSTEMS: All systems are reviewed and found to be negative except above    MEDICATIONS  (STANDING):  dextrose 5%. 1000 milliLiter(s) (50 mL/Hr) IV Continuous <Continuous>  finasteride 5 milliGRAM(s) Oral daily  gabapentin 100 milliGRAM(s) Oral at bedtime  heparin   Injectable 5000 Unit(s) SubCutaneous every 12 hours  indomethacin Suppository 100 milliGRAM(s) Rectal once  lactobacillus acidophilus 1 Tablet(s) Oral daily  melatonin 3 milliGRAM(s) Oral at bedtime  pantoprazole    Tablet 40 milliGRAM(s) Oral two times a day  saccharomyces boulardii 250 milliGRAM(s) Oral two times a day  sodium chloride 0.9%. 1000 milliLiter(s) (100 mL/Hr) IV Continuous <Continuous>  tamsulosin 0.4 milliGRAM(s) Oral at bedtime    MEDICATIONS  (PRN):  acetaminophen   Tablet .. 650 milliGRAM(s) Oral every 6 hours PRN Temp greater or equal to 38C (100.4F), Mild Pain (1 - 3)  glucagon  Injectable 1 milliGRAM(s) IntraMuscular once PRN Glucose LESS THAN 70 milligrams/deciliter  mirtazapine 7.5 milliGRAM(s) Oral at bedtime PRN insomnia      CAPILLARY BLOOD GLUCOSE        I&O's Summary      PHYSICAL EXAM:  Vital Signs Last 24 Hrs  T(C): 36.8 (26 May 2020 08:00), Max: 36.9 (26 May 2020 04:37)  T(F): 98.3 (26 May 2020 08:00), Max: 98.4 (26 May 2020 04:37)  HR: 62 (26 May 2020 08:00) (62 - 80)  BP: 112/63 (26 May 2020 08:00) (112/63 - 150/61)  BP(mean): --  RR: 18 (26 May 2020 08:00) (18 - 18)  SpO2: 94% (26 May 2020 08:00) (94% - 97%)      CONSTITUTIONAL: NAD,   EYES: PERRLA; icteric conjunctiva and sclera clear  ENMT: Moist oral mucosa,   NECK: Supple, no palpable masses; no thyromegaly  RESPIRATORY: Normal respiratory effort; lungs are clear to auscultation bilaterally  CARDIOVASCULAR: Regular rate and rhythm, normal S1 and S2, no murmur  EXTS: No lower extremity edema; Peripheral pulses are 2+ bilaterally  ABDOMEN: Nontender to palpation, normoactive bowel sounds, no rebound/guarding; No hepatosplenomegaly  MUSCLOSKELETAL:  no clubbing or cyanosis of digits; no joint swelling or tenderness to palpation  PSYCH: affect appropriate  NEUROLOGY: A+O to person, place, and time;  no gross sensory deficits;        LABS:                        10.3   14.08 )-----------( 299      ( 26 May 2020 07:35 )             32.2     05-26    140  |  106  |  60.0<H>  ----------------------------<  111<H>  5.3   |  21.0<L>  |  1.35<H>    Ca    8.9      26 May 2020 07:35    TPro  6.1<L>  /  Alb  2.4<L>  /  TBili  3.5<H>  /  DBili  x   /  AST  43<H>  /  ALT  37  /  AlkPhos  300<H>  05-26                RADIOLOGY & ADDITIONAL TESTS:  Results Reviewed:   Imaging Personally Reviewed:  Electrocardiogram Personally Reviewed:    COORDINATION OF CARE:  Care Discussed with Consultants/Other Providers [Y/N]:  Prior or Outpatient Records Reviewed [Y/N]:

## 2020-08-06 NOTE — PHYSICAL THERAPY INITIAL EVALUATION ADULT - PERTINENT HX OF CURRENT PROBLEM, REHAB EVAL
[id number] [address]  Outpatient Physical Therapy    Treatment Note        Date: 2020  Patient: Judy Cho  :   ACCT #: [de-identified]  Referring Practitioner: Vijay Clinton  Diagnosis: pain of both hip joints    Visit Information:  PT Visit Information  Onset Date: (years)  PT Insurance Information: [employer] [place], Medicare as of 20  Total # of Visits Approved: (100% coverage through , medicare guidelines )  Total # of Visits to Date: 5  Plan of Care/Certification Expiration Date: 20  No Show: 0  Canceled Appointment: 0  Progress Note Counter:     Subjective: Patient reports she is able to sleep longer without waking up due to pain. HEP Compliance:  [x] Good [] Fair [] Poor [] Reports not doing due to:    Vital Signs  Patient Currently in Pain: Denies   Pain Screening  Patient Currently in Pain: Denies    OBJECTIVE:   Exercises  Exercise 1: 3 way SLR on mat x15  Exercise 2: TFL stretch sidelying edge of mat 30 sec x 3 ea  Exercise 3: prone knee flexion x12 (keep pain free and just short of pelvic lift)  Exercise 4: hip flexor str 30s x 3 B- modified ruddy  Exercise 5: prone quad str 30s x 3  Exercise 6: bridges with RTB 5 sec x 15  Exercise 8: clamshells x15 B RTB  Exercise 10: sink exs x15 ea    ROM: [] NT  [] ROM measurements:  PROM RLE (degrees)  R SLR: 85  R Knee Flexion 0-145: prone knee flexion 118, 130 degrees in supine     PROM LLE (degrees)  LLE PROM: Exceptions  L SLR: 95  L Knee Flexion 0-145: prone knee flexion 106 degrees , supine 128 degrees  Assessment: Body structures, Functions, Activity limitations: Decreased functional mobility , Decreased ADL status, Decreased ROM, Decreased strength, Decreased coordination  Assessment: Patient demonstrates improved mobility with bilateral SLR this date. PKF continues to be limited by pain. Tolerates progression of exercises without reports of pain.   Treatment Diagnosis: hip pain, LE weakness    Goals:  Short term goals  Time Frame for Short term goals: 2 wks  Short term goal 1: Independent with HEP  Short term goal 2: Report 25% reduction in symptoms with decreased discomfort with rolling in sleep    Long term goals  Time Frame for Long term goals : 4 wks  Long term goal 1: Improve LE strength >/= 4+/5 to decrease pain with stairs  Long term goal 2: Improve LE ROM WFL to allow improved step length and decreased pain with bed mob  Long term goal 3: LEFS >/= 58/80 to demonstrate improved overall activity tolerance  Progress toward goals: continue towards all     POST-PAIN       Pain Rating (0-10 pain scale):   0/10   Location and pain description same as pre-treatment unless indicated. Action: [] NA   [] Perform HEP  [] Meds as prescribed  [] Modalities as prescribed   [] Call Physician     Frequency/Duration:  Plan  Times per week: 2-3  Plan weeks: 4  Current Treatment Recommendations: Strengthening, ROM, Manual Therapy - Soft Tissue Mobilization, Home Exercise Program, Safety Education & Training, Patient/Caregiver Education & Training  Plan Comment: Transfer care of pt to Vivienne Rouse, PT     Pt to continue current HEP. See objective section for any therapeutic exercise changes, additions or modifications this date.     PT Individual Minutes  Time In: 5313  Time Out: 1090  Minutes: 45  Timed Code Treatment Minutes: 45 Minutes  Procedure Minutes:0    Timed Activity Minutes Units   Ther Ex 45 3       Signature:  Electronically signed by Chu Jimenez PTA on 8/6/20 at 10:40 AM EDT 88 yo male with pmh of BPH, HTN, HLD, CKD3a, OA s/p L BORIS, distant scalp melanoma resection, was sent from pmd's office for further evaluation of painless jaundice.Now s/p EUS/ERCP with noted nerotic duodenal ulcer, biopsies taken of heaped up margins, large lymphadenopathy around portal vein and pancreas, multiple LN around perigastric/celiac area (s/p FNA x4), distal bile duct stricture noted, stent placed within bile duct.

## 2022-09-20 NOTE — H&P PST ADULT - OPHTHALMOLOGIC
From: Britta Short  To: Mary Royal  Sent: 9/20/2022 7:20 AM CDT  Subject: Sleep disturbance     Hi Dr Royal,  I fall asleep ok because I take Benadryl. Then I wake up around midnight to urinate. Once I am awake from that, even though I am tired, I can't tell back to sleep. Then I get pain in my neck and shoulders from lack of sleep. My pain is 8/10 in my neck and shoulders this morning. It's causing me to have an terrible headache. What should I do?   negative

## 2023-05-16 NOTE — ED ADULT TRIAGE NOTE - BMI (KG/M2)

## 2023-07-27 NOTE — H&P PST ADULT - NSICDXPASTMEDICALHX_GEN_ALL_CORE_FT
From: [place]  To: Ele Sabillon  Sent: 7/24/2023 10:56 AM CDT  Subject: Frankie Marlow. I'll be taking my second shot of .50mg Wednesday. You told me to let you know so you would be able to order the next refill   PAST MEDICAL HISTORY:  BPH (benign prostatic hyperplasia)     HLD (hyperlipidemia)     [place] (hard of hearing)     HTN (hypertension)     Melanoma     OA (osteoarthritis)

## 2024-03-29 NOTE — BRIEF OPERATIVE NOTE - SPECIMENS
none
FAMILY HISTORY:  Father  Still living? Unknown  FH: CAD (coronary artery disease), Age at diagnosis: Age Unknown    Mother  Still living? Unknown  FH: CAD (coronary artery disease), Age at diagnosis: Age Unknown